# Patient Record
Sex: FEMALE | Race: BLACK OR AFRICAN AMERICAN | NOT HISPANIC OR LATINO | Employment: FULL TIME | URBAN - METROPOLITAN AREA
[De-identification: names, ages, dates, MRNs, and addresses within clinical notes are randomized per-mention and may not be internally consistent; named-entity substitution may affect disease eponyms.]

---

## 2017-12-28 ENCOUNTER — HOSPITAL ENCOUNTER (EMERGENCY)
Facility: HOSPITAL | Age: 47
Discharge: HOME | End: 2017-12-28
Payer: COMMERCIAL

## 2017-12-28 VITALS
SYSTOLIC BLOOD PRESSURE: 144 MMHG | OXYGEN SATURATION: 98 % | HEIGHT: 66 IN | BODY MASS INDEX: 31.02 KG/M2 | WEIGHT: 193 LBS | DIASTOLIC BLOOD PRESSURE: 83 MMHG | HEART RATE: 104 BPM | RESPIRATION RATE: 20 BRPM | TEMPERATURE: 100 F

## 2017-12-28 DIAGNOSIS — N39.0 URINARY TRACT INFECTION WITH HEMATURIA, SITE UNSPECIFIED: Primary | ICD-10-CM

## 2017-12-28 DIAGNOSIS — R31.9 URINARY TRACT INFECTION WITH HEMATURIA, SITE UNSPECIFIED: Primary | ICD-10-CM

## 2017-12-28 LAB
BACTERIA #/AREA URNS AUTO: ABNORMAL /HPF
BILIRUB UR QL STRIP: NEGATIVE
CLARITY UR REFRACT.AUTO: ABNORMAL
COLOR UR AUTO: YELLOW
GLUCOSE UR QL STRIP: NEGATIVE
HGB UR QL STRIP: ABNORMAL
HYALINE CASTS UR QL AUTO: 0 /LPF
KETONES UR QL STRIP: NEGATIVE
LEUKOCYTE ESTERASE UR QL STRIP: ABNORMAL
MICROSCOPIC COMMENT: ABNORMAL
NITRITE UR QL STRIP: POSITIVE
PH UR STRIP: 7 [PH] (ref 5–8)
PROT UR QL STRIP: ABNORMAL
RBC #/AREA URNS AUTO: 30 /HPF (ref 0–4)
SP GR UR STRIP: 1.02 (ref 1–1.03)
URN SPEC COLLECT METH UR: ABNORMAL
UROBILINOGEN UR STRIP-ACNC: <2 EU/DL
WBC #/AREA URNS AUTO: 10 /HPF (ref 0–5)

## 2017-12-28 PROCEDURE — 81000 URINALYSIS NONAUTO W/SCOPE: CPT

## 2017-12-28 PROCEDURE — 63600175 PHARM REV CODE 636 W HCPCS: Performed by: PHYSICIAN ASSISTANT

## 2017-12-28 PROCEDURE — 99283 EMERGENCY DEPT VISIT LOW MDM: CPT | Mod: 25

## 2017-12-28 PROCEDURE — 96372 THER/PROPH/DIAG INJ SC/IM: CPT

## 2017-12-28 PROCEDURE — 25000003 PHARM REV CODE 250: Performed by: PHYSICIAN ASSISTANT

## 2017-12-28 RX ORDER — CLOPIDOGREL BISULFATE 75 MG/1
75 TABLET ORAL DAILY
COMMUNITY

## 2017-12-28 RX ORDER — FLUCONAZOLE 200 MG/1
200 TABLET ORAL DAILY
Qty: 1 TABLET | Refills: 0 | Status: SHIPPED | OUTPATIENT
Start: 2017-12-28 | End: 2017-12-29

## 2017-12-28 RX ORDER — CEPHALEXIN 500 MG/1
500 CAPSULE ORAL EVERY 8 HOURS
Qty: 24 CAPSULE | Refills: 0 | Status: SHIPPED | OUTPATIENT
Start: 2017-12-28 | End: 2018-01-05

## 2017-12-28 RX ORDER — ESCITALOPRAM OXALATE 20 MG/1
20 TABLET ORAL DAILY
COMMUNITY
End: 2018-05-20

## 2017-12-28 RX ORDER — CEFTRIAXONE 1 G/1
1 INJECTION, POWDER, FOR SOLUTION INTRAMUSCULAR; INTRAVENOUS
Status: COMPLETED | OUTPATIENT
Start: 2017-12-28 | End: 2017-12-28

## 2017-12-28 RX ORDER — BUMETANIDE 2 MG/1
2 TABLET ORAL DAILY
COMMUNITY

## 2017-12-28 RX ORDER — IBUPROFEN 200 MG
600 TABLET ORAL
Status: COMPLETED | OUTPATIENT
Start: 2017-12-28 | End: 2017-12-28

## 2017-12-28 RX ORDER — ATORVASTATIN CALCIUM 20 MG/1
20 TABLET, FILM COATED ORAL DAILY
COMMUNITY
End: 2022-05-28

## 2017-12-28 RX ORDER — POTASSIUM CHLORIDE 20 MEQ/1
20 TABLET, EXTENDED RELEASE ORAL 2 TIMES DAILY
COMMUNITY

## 2017-12-28 RX ORDER — OMEPRAZOLE 20 MG/1
40 CAPSULE, DELAYED RELEASE ORAL 2 TIMES DAILY
COMMUNITY
End: 2022-05-28

## 2017-12-28 RX ADMIN — CEFTRIAXONE SODIUM 1 G: 1 INJECTION, POWDER, FOR SOLUTION INTRAMUSCULAR; INTRAVENOUS at 07:12

## 2017-12-28 RX ADMIN — IBUPROFEN 600 MG: 200 TABLET, FILM COATED ORAL at 07:12

## 2017-12-29 NOTE — ED PROVIDER NOTES
History      Chief Complaint   Patient presents with    Dysuria     Reports lower back pain, lower abdominal pain with frequent urination, and dysuria.        Review of patient's allergies indicates:   Allergen Reactions    Ace inhibitors Swelling    Sulfa (sulfonamide antibiotics) Hives        HPI   HPI    12/28/2017, 6:07 PM   History obtained from the patient      History of Present Illness: Kasie Quevedo is a 47 y.o. female patient who presents to the Emergency Department for dysuria and urgency for 3-4 days.  She says she has had UTI's before and this feels same.  She denies fever, n/v.  Admits to mild bilateral flank pain. Symptoms are moderate in severity.     No further complaints or concerns at this time.           PCP: Rigo Sultana MD       Past Medical History:  Past Medical History:   Diagnosis Date    Anxiety     CHF (congestive heart failure)     Coronary artery disease     GERD (gastroesophageal reflux disease)     Hypertension     MI, old          Past Surgical History:  Past Surgical History:   Procedure Laterality Date    BLADDER REPAIR      CARDIAC CATHETERIZATION      CHOLECYSTECTOMY      CORONARY ANGIOPLASTY WITH STENT PLACEMENT      HYSTERECTOMY      STOMACH SURGERY      Gastric sleeve           Family History:  History reviewed. No pertinent family history.        Social History:  Social History     Social History Main Topics    Smoking status: Never Smoker    Smokeless tobacco: Never Used    Alcohol use Yes      Comment: daily, wine    Drug use: No    Sexual activity: Not on file       ROS     Review of Systems   Constitutional: Negative for chills and fever.   HENT: Negative for sore throat.    Respiratory: Negative for shortness of breath.    Cardiovascular: Negative for chest pain.   Gastrointestinal: Negative for nausea.   Genitourinary: Positive for dysuria, frequency and urgency. Negative for vaginal bleeding, vaginal discharge and vaginal pain.  "  Musculoskeletal: Negative for back pain.   Skin: Negative for rash.   Neurological: Negative for weakness.   Hematological: Does not bruise/bleed easily.   All other systems reviewed and are negative.      Physical Exam      Initial Vitals [12/28/17 1755]   BP Pulse Resp Temp SpO2   (!) 182/83 104 18 98.7 °F (37.1 °C) 98 %      MAP       116         Physical Exam  Vital signs and nursing notes reviewed.  Constitutional: Patient is in NAD. Awake and alert. Well-developed and well-nourished.  Head: Atraumatic. Normocephalic.  Eyes: PERRL. EOM intact. Conjunctivae nl. No scleral icterus.  ENT: Mucous membranes are moist. Oropharynx is clear.  Neck: Supple. No JVD. No lymphadenopathy.  No meningismus  Cardiovascular: Regular rate and rhythm. No murmurs, rubs, or gallops. Distal pulses are 2+ and symmetric.  Pulmonary/Chest: No respiratory distress. Clear to auscultation bilaterally. No wheezing, rales, or rhonchi.  Abdominal: Soft. Non-distended. No TTP. No rebound, guarding, or rigidity. Good bowel sounds.  Genitourinary: No CVA tenderness  Musculoskeletal: Moves all extremities. No edema.   Skin: Warm and dry.  Neurological: Awake and alert. No acute focal neurological deficits are appreciated.  Psychiatric: Normal affect. Good eye contact. Appropriate in content.      ED Course          Procedures  ED Vital Signs:  Vitals:    12/28/17 1755 12/28/17 1945   BP: (!) 182/83 (!) 144/83   Pulse: 104 104   Resp: 18 20   Temp: 98.7 °F (37.1 °C) 100.1 °F (37.8 °C)   TempSrc: Oral Oral   SpO2: 98% 98%   Weight: 87.5 kg (193 lb)    Height: 5' 6" (1.676 m)          Results for orders placed or performed during the hospital encounter of 12/28/17   Urinalysis   Result Value Ref Range    Specimen UA Urine, Clean Catch     Color, UA Yellow Yellow, Straw, Kalli    Appearance, UA Hazy (A) Clear    pH, UA 7.0 5.0 - 8.0    Specific Gravity, UA 1.020 1.005 - 1.030    Protein, UA 2+ (A) Negative    Glucose, UA Negative Negative    " Ketones, UA Negative Negative    Bilirubin (UA) Negative Negative    Occult Blood UA 3+ (A) Negative    Nitrite, UA Positive (A) Negative    Urobilinogen, UA <2.0 <2.0 EU/dL    Leukocytes, UA 2+ (A) Negative   Urinalysis Microscopic   Result Value Ref Range    RBC, UA 30 (H) 0 - 4 /hpf    WBC, UA 10 (H) 0 - 5 /hpf    Bacteria, UA Few (A) None-Occ /hpf    Hyaline Casts, UA 0 0-1/lpf /lpf    Microscopic Comment SEE COMMENT              Imaging Results:  Imaging Results    None            The Emergency Provider reviewed the vital signs and test results, which are outlined above.    ED Discussion             Medication(s) given in the ER:  Medications   cefTRIAXone injection 1 g (1 g Intramuscular Given 12/28/17 1914)   ibuprofen tablet 600 mg (600 mg Oral Given 12/28/17 1952)           Follow-up Information     Rigo Sultana MD In 3 days.    Specialty:  Family Medicine  Contact information:  [address] [address] [address]  [phone]             [facility].    Specialty:  Emergency Medicine  Why:  If symptoms worsen  Contact information:  [address] [address] [address] [phone]                     New Prescriptions    CEPHALEXIN (KEFLEX) 500 MG CAPSULE    Take 1 capsule (500 mg total) by mouth every 8 (eight) hours.    FLUCONAZOLE (DIFLUCAN) 200 MG TAB    Take 1 tablet (200 mg total) by mouth once daily.          Medical Decision Making      Pt developing fever.  Administered motrin.  She agrees to rter if worsening of symptoms.  All findings were reviewed with the patient/family in detail.   All remaining questions and concerns were addressed at that time.  Patient/family has been counseled regarding the need for follow-up as well as the indication to return to the emergency room should new or worrisome developments occur.        MDM               Clinical Impression:        ICD-10-CM ICD-9-CM   1. Urinary tract infection with  hematuria, site unspecified N39.0 599.0    R31.9              Annie Crisostomo PA-C  12/28/17 1952

## 2018-05-20 ENCOUNTER — HOSPITAL ENCOUNTER (EMERGENCY)
Facility: HOSPITAL | Age: 48
Discharge: HOME | End: 2018-05-20
Payer: COMMERCIAL

## 2018-05-20 VITALS
RESPIRATION RATE: 18 BRPM | HEIGHT: 66 IN | OXYGEN SATURATION: 97 % | TEMPERATURE: 99 F | SYSTOLIC BLOOD PRESSURE: 123 MMHG | BODY MASS INDEX: 31.5 KG/M2 | DIASTOLIC BLOOD PRESSURE: 66 MMHG | HEART RATE: 71 BPM | WEIGHT: 196 LBS

## 2018-05-20 DIAGNOSIS — J40 BRONCHITIS: Primary | ICD-10-CM

## 2018-05-20 DIAGNOSIS — R05.9 COUGH: ICD-10-CM

## 2018-05-20 PROCEDURE — 63600175 PHARM REV CODE 636 W HCPCS: Performed by: PHYSICIAN ASSISTANT

## 2018-05-20 PROCEDURE — 99284 EMERGENCY DEPT VISIT MOD MDM: CPT | Mod: 25

## 2018-05-20 RX ORDER — CYANOCOBALAMIN (VITAMIN B-12) 500 MCG
TABLET ORAL DAILY
COMMUNITY
End: 2022-05-28

## 2018-05-20 RX ORDER — PREDNISONE 20 MG/1
20 TABLET ORAL 2 TIMES DAILY
Qty: 14 TABLET | Refills: 0 | Status: SHIPPED | OUTPATIENT
Start: 2018-05-20 | End: 2018-05-27

## 2018-05-20 RX ORDER — VALSARTAN 80 MG/1
80 TABLET ORAL DAILY
COMMUNITY

## 2018-05-20 RX ORDER — PREDNISONE 20 MG/1
60 TABLET ORAL
Status: COMPLETED | OUTPATIENT
Start: 2018-05-20 | End: 2018-05-20

## 2018-05-20 RX ORDER — DOXYCYCLINE 100 MG/1
100 CAPSULE ORAL 2 TIMES DAILY
Qty: 20 CAPSULE | Refills: 0 | Status: SHIPPED | OUTPATIENT
Start: 2018-05-20 | End: 2019-12-30

## 2018-05-20 RX ORDER — ASPIRIN 81 MG/1
81 TABLET ORAL DAILY
COMMUNITY

## 2018-05-20 RX ORDER — MODAFINIL 200 MG/1
200 TABLET ORAL DAILY
COMMUNITY
Start: 2014-12-20

## 2018-05-20 RX ORDER — BENZONATATE 100 MG/1
200 CAPSULE ORAL 3 TIMES DAILY PRN
Qty: 20 CAPSULE | Refills: 0 | Status: SHIPPED | OUTPATIENT
Start: 2018-05-20 | End: 2018-05-30

## 2018-05-20 RX ORDER — CITALOPRAM 20 MG/1
20 TABLET, FILM COATED ORAL DAILY
COMMUNITY
Start: 2015-03-02

## 2018-05-20 RX ORDER — METOPROLOL SUCCINATE 25 MG/1
25 TABLET, EXTENDED RELEASE ORAL DAILY
COMMUNITY
Start: 2015-01-15 | End: 2022-05-28

## 2018-05-20 RX ADMIN — PREDNISONE 60 MG: 20 TABLET ORAL at 07:05

## 2018-05-20 NOTE — ED PROVIDER NOTES
"Encounter Date: 5/20/2018       History     Chief Complaint   Patient presents with    URI     Reports URI and treated last week, but states, "I just can't shake it. It's like I need an injection." Reports getting decadron injection and amoxil. Still taking abx      48 yo with cough congestion, x 1 week, seen pcp last week, given rocephin, amoxicillin and steroid shot. Cough persists. Has home nebulizer and albuterol HFA. No fever          Review of patient's allergies indicates:   Allergen Reactions    Ace inhibitors Swelling    Sulfa (sulfonamide antibiotics) Hives     Past Medical History:   Diagnosis Date    Anxiety     CHF (congestive heart failure)     Coronary artery disease     GERD (gastroesophageal reflux disease)     Hypertension     MI, old      Past Surgical History:   Procedure Laterality Date    BLADDER REPAIR      CARDIAC CATHETERIZATION      CHOLECYSTECTOMY      CORONARY ANGIOPLASTY WITH STENT PLACEMENT      HYSTERECTOMY      STOMACH SURGERY      Gastric sleeve     History reviewed. No pertinent family history.  Social History   Substance Use Topics    Smoking status: Never Smoker    Smokeless tobacco: Never Used    Alcohol use Yes      Comment: daily, wine     Review of Systems   Constitutional: Negative for fever.   HENT: Negative for sore throat.    Respiratory: Positive for cough.    Cardiovascular: Negative for chest pain.   Gastrointestinal: Negative for nausea.   Genitourinary: Negative for dysuria.   Musculoskeletal: Negative for back pain.   Skin: Negative for rash.   Neurological: Negative for weakness.   Hematological: Does not bruise/bleed easily.       Physical Exam     Initial Vitals [05/20/18 1834]   BP Pulse Resp Temp SpO2   123/66 71 18 98.6 °F (37 °C) 97 %      MAP       85         Physical Exam    Nursing note and vitals reviewed.  Constitutional: She appears well-developed and well-nourished. No distress.   HENT:   Head: Normocephalic and atraumatic.   Eyes: " Conjunctivae and EOM are normal. Pupils are equal, round, and reactive to light.   Neck: Normal range of motion.   Cardiovascular: Normal rate, regular rhythm and normal heart sounds.   Pulmonary/Chest: Breath sounds normal. No respiratory distress. She has no wheezes. She has no rhonchi. She has no rales. She exhibits no tenderness.   Abdominal: Soft. Bowel sounds are normal. There is no tenderness.   Musculoskeletal: Normal range of motion.   Neurological: She is alert and oriented to person, place, and time. She has normal strength.   Skin: Skin is warm and dry. No rash noted.   Psychiatric: She has a normal mood and affect. Thought content normal.         ED Course   Procedures  Labs Reviewed - No data to display                 Instructed patient to continue her albuterol inhaler and to follow up with her pcp. Return to Ed anytime if symptoms worsens              Clinical Impression:   The primary encounter diagnosis was Bronchitis. A diagnosis of Cough was also pertinent to this visit.    Disposition:   Disposition: Discharged  Condition: Stable                        BRANDON Rojas  05/20/18 1929

## 2018-05-21 NOTE — ED NOTES
Pt updated on test results and poc. Pt has verbalized understanding, and she denies having any further questions or concerns at this time.

## 2019-06-23 ENCOUNTER — HOSPITAL ENCOUNTER (EMERGENCY)
Facility: HOSPITAL | Age: 49
Discharge: HOME | End: 2019-06-23
Attending: EMERGENCY MEDICINE
Payer: COMMERCIAL

## 2019-06-23 VITALS
OXYGEN SATURATION: 98 % | TEMPERATURE: 98 F | WEIGHT: 193.44 LBS | RESPIRATION RATE: 18 BRPM | BODY MASS INDEX: 31.22 KG/M2 | HEART RATE: 70 BPM | SYSTOLIC BLOOD PRESSURE: 130 MMHG | DIASTOLIC BLOOD PRESSURE: 76 MMHG

## 2019-06-23 DIAGNOSIS — R00.2 PALPITATIONS: Primary | ICD-10-CM

## 2019-06-23 DIAGNOSIS — E87.6 HYPOKALEMIA: ICD-10-CM

## 2019-06-23 LAB
ALBUMIN SERPL BCP-MCNC: 3.9 G/DL (ref 3.5–5.2)
ALP SERPL-CCNC: 70 U/L (ref 55–135)
ALT SERPL W/O P-5'-P-CCNC: 14 U/L (ref 10–44)
ANION GAP SERPL CALC-SCNC: 11 MMOL/L (ref 8–16)
AST SERPL-CCNC: 15 U/L (ref 10–40)
BASOPHILS # BLD AUTO: 0.01 K/UL (ref 0–0.2)
BASOPHILS NFR BLD: 0.2 % (ref 0–1.9)
BILIRUB SERPL-MCNC: 0.5 MG/DL (ref 0.1–1)
BNP SERPL-MCNC: 60 PG/ML (ref 0–99)
BUN SERPL-MCNC: 9 MG/DL (ref 6–20)
CALCIUM SERPL-MCNC: 9.2 MG/DL (ref 8.7–10.5)
CHLORIDE SERPL-SCNC: 106 MMOL/L (ref 95–110)
CO2 SERPL-SCNC: 25 MMOL/L (ref 23–29)
CREAT SERPL-MCNC: 0.9 MG/DL (ref 0.5–1.4)
D DIMER PPP IA.FEU-MCNC: 0.9 MG/L FEU
DIFFERENTIAL METHOD: NORMAL
EOSINOPHIL # BLD AUTO: 0.1 K/UL (ref 0–0.5)
EOSINOPHIL NFR BLD: 1 % (ref 0–8)
ERYTHROCYTE [DISTWIDTH] IN BLOOD BY AUTOMATED COUNT: 14.4 % (ref 11.5–14.5)
EST. GFR  (AFRICAN AMERICAN): >60 ML/MIN/1.73 M^2
EST. GFR  (NON AFRICAN AMERICAN): >60 ML/MIN/1.73 M^2
GLUCOSE SERPL-MCNC: 107 MG/DL (ref 70–110)
HCT VFR BLD AUTO: 39.2 % (ref 37–48.5)
HGB BLD-MCNC: 13.1 G/DL (ref 12–16)
LYMPHOCYTES # BLD AUTO: 1.9 K/UL (ref 1–4.8)
LYMPHOCYTES NFR BLD: 38.4 % (ref 18–48)
MCH RBC QN AUTO: 28.5 PG (ref 27–31)
MCHC RBC AUTO-ENTMCNC: 33.4 G/DL (ref 32–36)
MCV RBC AUTO: 85 FL (ref 82–98)
MONOCYTES # BLD AUTO: 0.4 K/UL (ref 0.3–1)
MONOCYTES NFR BLD: 7.6 % (ref 4–15)
NEUTROPHILS # BLD AUTO: 2.6 K/UL (ref 1.8–7.7)
NEUTROPHILS NFR BLD: 52.8 % (ref 38–73)
PLATELET # BLD AUTO: 196 K/UL (ref 150–350)
PMV BLD AUTO: 11 FL (ref 9.2–12.9)
POTASSIUM SERPL-SCNC: 3.2 MMOL/L (ref 3.5–5.1)
PROT SERPL-MCNC: 7.3 G/DL (ref 6–8.4)
RBC # BLD AUTO: 4.59 M/UL (ref 4–5.4)
SODIUM SERPL-SCNC: 142 MMOL/L (ref 136–145)
TROPONIN I SERPL DL<=0.01 NG/ML-MCNC: 0.02 NG/ML (ref 0–0.03)
TROPONIN I SERPL DL<=0.01 NG/ML-MCNC: 0.03 NG/ML (ref 0–0.03)
WBC # BLD AUTO: 5 K/UL (ref 3.9–12.7)

## 2019-06-23 PROCEDURE — 93010 ELECTROCARDIOGRAM REPORT: CPT | Mod: ,,, | Performed by: NUCLEAR MEDICINE

## 2019-06-23 PROCEDURE — 85025 COMPLETE CBC W/AUTO DIFF WBC: CPT | Mod: ER

## 2019-06-23 PROCEDURE — 25500020 PHARM REV CODE 255: Mod: ER | Performed by: EMERGENCY MEDICINE

## 2019-06-23 PROCEDURE — 99285 EMERGENCY DEPT VISIT HI MDM: CPT | Mod: 25,ER

## 2019-06-23 PROCEDURE — 93005 ELECTROCARDIOGRAM TRACING: CPT | Mod: ER

## 2019-06-23 PROCEDURE — 86703 HIV-1/HIV-2 1 RESULT ANTBDY: CPT

## 2019-06-23 PROCEDURE — 99900035 HC TECH TIME PER 15 MIN (STAT): Mod: ER

## 2019-06-23 PROCEDURE — 93010 EKG 12-LEAD: ICD-10-PCS | Mod: ,,, | Performed by: NUCLEAR MEDICINE

## 2019-06-23 PROCEDURE — 85379 FIBRIN DEGRADATION QUANT: CPT | Mod: ER

## 2019-06-23 PROCEDURE — 25000003 PHARM REV CODE 250: Mod: ER | Performed by: EMERGENCY MEDICINE

## 2019-06-23 PROCEDURE — 80053 COMPREHEN METABOLIC PANEL: CPT | Mod: ER

## 2019-06-23 PROCEDURE — 84484 ASSAY OF TROPONIN QUANT: CPT | Mod: ER

## 2019-06-23 PROCEDURE — 83880 ASSAY OF NATRIURETIC PEPTIDE: CPT | Mod: ER

## 2019-06-23 RX ORDER — BUSPIRONE HYDROCHLORIDE 15 MG/1
15 TABLET ORAL NIGHTLY
COMMUNITY

## 2019-06-23 RX ORDER — LORATADINE 10 MG/1
10 TABLET ORAL DAILY
COMMUNITY

## 2019-06-23 RX ORDER — ASPIRIN 325 MG
325 TABLET ORAL
Status: COMPLETED | OUTPATIENT
Start: 2019-06-23 | End: 2019-06-23

## 2019-06-23 RX ORDER — POTASSIUM CHLORIDE 20 MEQ/15ML
40 SOLUTION ORAL
Status: COMPLETED | OUTPATIENT
Start: 2019-06-23 | End: 2019-06-23

## 2019-06-23 RX ORDER — ACETAMINOPHEN 500 MG
1000 TABLET ORAL
Status: COMPLETED | OUTPATIENT
Start: 2019-06-23 | End: 2019-06-23

## 2019-06-23 RX ORDER — TRAMADOL HYDROCHLORIDE 50 MG/1
50 TABLET ORAL EVERY 6 HOURS
COMMUNITY
End: 2023-05-17 | Stop reason: CLARIF

## 2019-06-23 RX ORDER — LOSARTAN POTASSIUM 50 MG/1
50 TABLET ORAL DAILY
COMMUNITY

## 2019-06-23 RX ORDER — CYANOCOBALAMIN 1000 UG/ML
2000 INJECTION, SOLUTION INTRAMUSCULAR; SUBCUTANEOUS
COMMUNITY

## 2019-06-23 RX ORDER — IBUPROFEN 200 MG
1 CAPSULE ORAL DAILY
COMMUNITY

## 2019-06-23 RX ADMIN — ASPIRIN 325 MG ORAL TABLET 325 MG: 325 PILL ORAL at 02:06

## 2019-06-23 RX ADMIN — POTASSIUM CHLORIDE 40 MEQ: 20 SOLUTION ORAL at 04:06

## 2019-06-23 RX ADMIN — ACETAMINOPHEN 1000 MG: 500 TABLET ORAL at 06:06

## 2019-06-23 RX ADMIN — IOHEXOL 100 ML: 350 INJECTION, SOLUTION INTRAVENOUS at 06:06

## 2019-06-23 NOTE — ED PROVIDER NOTES
Encounter Date: 6/23/2019       History     Chief Complaint   Patient presents with    Palpitations     palpations and SOB off and on for a week     The history is provided by the patient.   Palpitations    This is a new problem. The current episode started several days ago (about one week). The problem occurs intermittently. The problem has been waxing and waning. The problem is associated with an unknown factor. Associated symptoms include malaise/fatigue, chest pressure and shortness of breath. Pertinent negatives include no diaphoresis, no fever, no numbness, no chest pain, no claudication, no exertional chest pressure, no irregular heartbeat, no near-syncope, no orthopnea, no PND, no syncope, no abdominal pain, no nausea, no vomiting, no headaches, no back pain, no leg pain, no lower extremity edema, no dizziness, no weakness, no cough, no hemoptysis and no sputum production. She has tried nothing for the symptoms. The treatment provided no relief. Risk factors include a sedentary lifestyle. Her past medical history is significant for heart disease.     Review of patient's allergies indicates:   Allergen Reactions    Ace inhibitors Swelling    Sulfa (sulfonamide antibiotics) Hives     Past Medical History:   Diagnosis Date    Anxiety     CHF (congestive heart failure)     Coronary artery disease     GERD (gastroesophageal reflux disease)     Hypertension     MI, old      Past Surgical History:   Procedure Laterality Date    BLADDER REPAIR      CARDIAC CATHETERIZATION      CHOLECYSTECTOMY      CORONARY ANGIOPLASTY WITH STENT PLACEMENT      HYSTERECTOMY      STOMACH SURGERY      Gastric sleeve     No family history on file.  Social History     Tobacco Use    Smoking status: Never Smoker    Smokeless tobacco: Never Used   Substance Use Topics    Alcohol use: Yes     Comment: daily, wine    Drug use: No     Review of Systems   Constitutional: Positive for malaise/fatigue. Negative for diaphoresis  and fever.   HENT: Negative for sore throat.    Respiratory: Positive for shortness of breath. Negative for cough, hemoptysis and sputum production.    Cardiovascular: Positive for palpitations. Negative for chest pain, orthopnea, claudication, syncope, PND and near-syncope.   Gastrointestinal: Negative for abdominal pain, nausea and vomiting.   Genitourinary: Negative for dysuria.   Musculoskeletal: Negative for back pain.   Skin: Negative for rash.   Neurological: Negative for dizziness, weakness, numbness and headaches.   Hematological: Does not bruise/bleed easily.   All other systems reviewed and are negative.      Physical Exam     Initial Vitals [06/23/19 1429]   BP Pulse Resp Temp SpO2   (!) 171/100 83 18 98.3 °F (36.8 °C) 98 %      MAP       --         Physical Exam    Nursing note and vitals reviewed.  Constitutional: She appears well-developed and well-nourished.   HENT:   Head: Normocephalic and atraumatic.   Mouth/Throat: No oropharyngeal exudate.   Eyes: Conjunctivae and EOM are normal. Pupils are equal, round, and reactive to light.   Neck: Normal range of motion. Neck supple. No thyromegaly present.   Cardiovascular: Normal rate, regular rhythm, normal heart sounds and intact distal pulses. Exam reveals no gallop and no friction rub.    No murmur heard.  Pulmonary/Chest: Effort normal and breath sounds normal. No respiratory distress. She has no decreased breath sounds. She has no wheezes. She has no rhonchi. She exhibits bony tenderness (in area diagrammed, reproduces pt's complaint). She exhibits no tenderness.       Abdominal: Soft. Bowel sounds are normal. She exhibits no distension. There is no tenderness. There is no rebound and no guarding.   Musculoskeletal: Normal range of motion. She exhibits no edema or tenderness.   Lymphadenopathy:     She has no cervical adenopathy.   Neurological: She is alert and oriented to person, place, and time. She has normal strength. No cranial nerve deficit or  sensory deficit.   Skin: Skin is warm and dry. No rash noted.   Psychiatric: She has a normal mood and affect. Her behavior is normal. Judgment and thought content normal.         ED Course   Procedures  Labs Reviewed   COMPREHENSIVE METABOLIC PANEL - Abnormal; Notable for the following components:       Result Value    Potassium 3.2 (*)     All other components within normal limits   D DIMER, QUANTITATIVE - Abnormal; Notable for the following components:    D-Dimer 0.90 (*)     All other components within normal limits   CBC W/ AUTO DIFFERENTIAL   TROPONIN I   B-TYPE NATRIURETIC PEPTIDE   TROPONIN I   D DIMER, QUANTITATIVE   HIV 1 / 2 ANTIBODY     Results for orders placed or performed during the hospital encounter of 06/23/19   CBC auto differential   Result Value Ref Range    WBC 5.00 3.90 - 12.70 K/uL    RBC 4.59 4.00 - 5.40 M/uL    Hemoglobin 13.1 12.0 - 16.0 g/dL    Hematocrit 39.2 37.0 - 48.5 %    Mean Corpuscular Volume 85 82 - 98 fL    Mean Corpuscular Hemoglobin 28.5 27.0 - 31.0 pg    Mean Corpuscular Hemoglobin Conc 33.4 32.0 - 36.0 g/dL    RDW 14.4 11.5 - 14.5 %    Platelets 196 150 - 350 K/uL    MPV 11.0 9.2 - 12.9 fL    Gran # (ANC) 2.6 1.8 - 7.7 K/uL    Lymph # 1.9 1.0 - 4.8 K/uL    Mono # 0.4 0.3 - 1.0 K/uL    Eos # 0.1 0.0 - 0.5 K/uL    Baso # 0.01 0.00 - 0.20 K/uL    Gran% 52.8 38.0 - 73.0 %    Lymph% 38.4 18.0 - 48.0 %    Mono% 7.6 4.0 - 15.0 %    Eosinophil% 1.0 0.0 - 8.0 %    Basophil% 0.2 0.0 - 1.9 %    Differential Method Automated    Comprehensive metabolic panel   Result Value Ref Range    Sodium 142 136 - 145 mmol/L    Potassium 3.2 (L) 3.5 - 5.1 mmol/L    Chloride 106 95 - 110 mmol/L    CO2 25 23 - 29 mmol/L    Glucose 107 70 - 110 mg/dL    BUN, Bld 9 6 - 20 mg/dL    Creatinine 0.9 0.5 - 1.4 mg/dL    Calcium 9.2 8.7 - 10.5 mg/dL    Total Protein 7.3 6.0 - 8.4 g/dL    Albumin 3.9 3.5 - 5.2 g/dL    Total Bilirubin 0.5 0.1 - 1.0 mg/dL    Alkaline Phosphatase 70 55 - 135 U/L    AST 15 10 - 40  U/L    ALT 14 10 - 44 U/L    Anion Gap 11 8 - 16 mmol/L    eGFR if African American >60.0 >60 mL/min/1.73 m^2    eGFR if non African American >60.0 >60 mL/min/1.73 m^2   Troponin I #1   Result Value Ref Range    Troponin I 0.024 0.000 - 0.026 ng/mL   B-Type natriuretic peptide (BNP)   Result Value Ref Range    BNP 60 0 - 99 pg/mL   Troponin I #2   Result Value Ref Range    Troponin I 0.025 0.000 - 0.026 ng/mL   D dimer, quantitative   Result Value Ref Range    D-Dimer 0.90 (H) <0.50 mg/L FEU       EKG Readings: (Independently Interpreted)   Initial Reading: No STEMI. Rhythm: Normal Sinus Rhythm. Heart Rate: 80. Ectopy: No Ectopy. Conduction: Normal. ST Segments: Normal ST Segments. T Waves: Normal. Axis: Left Axis Deviation. Clinical Impression: Normal Sinus Rhythm       Imaging Results          X-Ray Chest AP Portable (Final result)  Result time 06/23/19 14:57:21    Final result by Dima Cunningham MD (06/23/19 14:57:21)                 Impression:      No acute cardiopulmonary disease.      Electronically signed by: Dima Cunningham MD  Date:    06/23/2019  Time:    14:57             Narrative:    EXAMINATION:  XR CHEST AP PORTABLE    CLINICAL HISTORY:  palpitations;    COMPARISON:  Chest x-ray, 05/20/2018    FINDINGS:  The lungs are clear. The heart size is borderline enlarged.  No pleural effusion or pneumothorax or pulmonary edema.  The bones are intact.                                    Vitals:    06/23/19 1429 06/23/19 1435 06/23/19 1447 06/23/19 1619   BP: (!) 171/100  (!) 168/91 (!) 154/82   Pulse: 83 78 78 72   Resp: 18 20 18   Temp: 98.3 °F (36.8 °C)      TempSrc: Oral      SpO2: 98%  98% 99%   Weight: 87.8 kg (193 lb 7.3 oz)       06/23/19 1647 06/23/19 1702 06/23/19 1729   BP: (!) 173/85 (!) 172/81 (!) 140/66   Pulse: 73 72 79   Resp: 18 17 20   Temp:      TempSrc:      SpO2: 98% 98% 98%   Weight:          Results for orders placed or performed during the hospital encounter of 06/23/19   CBC auto  differential   Result Value Ref Range    WBC 5.00 3.90 - 12.70 K/uL    RBC 4.59 4.00 - 5.40 M/uL    Hemoglobin 13.1 12.0 - 16.0 g/dL    Hematocrit 39.2 37.0 - 48.5 %    Mean Corpuscular Volume 85 82 - 98 fL    Mean Corpuscular Hemoglobin 28.5 27.0 - 31.0 pg    Mean Corpuscular Hemoglobin Conc 33.4 32.0 - 36.0 g/dL    RDW 14.4 11.5 - 14.5 %    Platelets 196 150 - 350 K/uL    MPV 11.0 9.2 - 12.9 fL    Gran # (ANC) 2.6 1.8 - 7.7 K/uL    Lymph # 1.9 1.0 - 4.8 K/uL    Mono # 0.4 0.3 - 1.0 K/uL    Eos # 0.1 0.0 - 0.5 K/uL    Baso # 0.01 0.00 - 0.20 K/uL    Gran% 52.8 38.0 - 73.0 %    Lymph% 38.4 18.0 - 48.0 %    Mono% 7.6 4.0 - 15.0 %    Eosinophil% 1.0 0.0 - 8.0 %    Basophil% 0.2 0.0 - 1.9 %    Differential Method Automated    Comprehensive metabolic panel   Result Value Ref Range    Sodium 142 136 - 145 mmol/L    Potassium 3.2 (L) 3.5 - 5.1 mmol/L    Chloride 106 95 - 110 mmol/L    CO2 25 23 - 29 mmol/L    Glucose 107 70 - 110 mg/dL    BUN, Bld 9 6 - 20 mg/dL    Creatinine 0.9 0.5 - 1.4 mg/dL    Calcium 9.2 8.7 - 10.5 mg/dL    Total Protein 7.3 6.0 - 8.4 g/dL    Albumin 3.9 3.5 - 5.2 g/dL    Total Bilirubin 0.5 0.1 - 1.0 mg/dL    Alkaline Phosphatase 70 55 - 135 U/L    AST 15 10 - 40 U/L    ALT 14 10 - 44 U/L    Anion Gap 11 8 - 16 mmol/L    eGFR if African American >60.0 >60 mL/min/1.73 m^2    eGFR if non African American >60.0 >60 mL/min/1.73 m^2   Troponin I #1   Result Value Ref Range    Troponin I 0.024 0.000 - 0.026 ng/mL   B-Type natriuretic peptide (BNP)   Result Value Ref Range    BNP 60 0 - 99 pg/mL   Troponin I #2   Result Value Ref Range    Troponin I 0.025 0.000 - 0.026 ng/mL   D dimer, quantitative   Result Value Ref Range    D-Dimer 0.90 (H) <0.50 mg/L FEU         Imaging Results          X-Ray Chest AP Portable (Final result)  Result time 06/23/19 14:57:21    Final result by Dima Cunningham MD (06/23/19 14:57:21)                 Impression:      No acute cardiopulmonary disease.      Electronically  signed by: Dima Cunningham MD  Date:    06/23/2019  Time:    14:57             Narrative:    EXAMINATION:  XR CHEST AP PORTABLE    CLINICAL HISTORY:  palpitations;    COMPARISON:  Chest x-ray, 05/20/2018    FINDINGS:  The lungs are clear. The heart size is borderline enlarged.  No pleural effusion or pneumothorax or pulmonary edema.  The bones are intact.                                Medications   acetaminophen tablet 1,000 mg (has no administration in time range)   aspirin tablet 325 mg (325 mg Oral Given 6/23/19 7885)   potassium chloride 10% oral solution 40 mEq (40 mEq Oral Given 6/23/19 1626)         4:08 PM  - Re-evaluation:  The patient is resting comfortably and is in no acute distress.  Pt states she had an episode of palpitations that have spontaneously resolved.  In looking at rhythm strip, no anomaly noted.  Discussed test results and notified of pending labs. Answered questions at this time. Will order ddimer to evaluate for possible PE.    5:10 PM Re-evaluation. Dr. Morris reassessed the pt. The pt is resting comfortably and is in no acute distress.  ddimer is elevated. Discussed available test results and notified pt of pending labs. Answered any questions at this time. Will order CTA chest to r/o PE.    6:00 PM - Transfer of Care: Patient care transferred from Dr. Morris to Dr. Funes, pending CTA.      7:42 PM - Counseling: Spoke with the patient and discussed todays findings, in addition to providing specific details for the plan of care and counseling regarding the diagnosis and prognosis. Questions are answered at this time.              Pre-hypertension/Hypertension: The pt has been informed that they may have pre-hypertension or hypertension based on a blood pressure reading in the ED. I recommend that the pt call the PCP listed on their discharge instructions or a physician of their choice this week to arrange f/u for further evaluation of possible pre-hypertension or hypertension.      Kasie Quevedo was given a handout which discussed their disease process, precautions, and instructions for follow-up and therapy.           Medication List      ASK your doctor about these medications    aspirin 81 MG EC tablet  Commonly known as:  ECOTRIN     atorvastatin 20 MG tablet  Commonly known as:  LIPITOR     bumetanide 2 MG tablet  Commonly known as:  BUMEX     busPIRone 15 MG tablet  Commonly known as:  BUSPAR     calcium citrate 200 mg (950 mg) tablet  Commonly known as:  CALCITRATE     cholecalciferol (vitamin D3) 400 unit Tab  Commonly known as:  VITAMIN D3     citalopram 20 MG tablet  Commonly known as:  CELEXA     clopidogrel 75 mg tablet  Commonly known as:  PLAVIX     doxycycline 100 MG capsule  Commonly known as:  MONODOX  Take 1 capsule (100 mg total) by mouth 2 (two) times daily.     loratadine 10 mg tablet  Commonly known as:  CLARITIN     losartan 50 MG tablet  Commonly known as:  COZAAR     metoprolol succinate 25 MG 24 hr tablet  Commonly known as:  TOPROL-XL     modafinil 200 MG Tab  Commonly known as:  PROVIGIL     multivitamin capsule     omeprazole 20 MG capsule  Commonly known as:  PRILOSEC     potassium chloride SA 20 MEQ tablet  Commonly known as:  K-DUR,KLOR-CON     traMADol 50 mg tablet  Commonly known as:  ULTRAM     valsartan 80 MG tablet  Commonly known as:  DIOVAN     VITAMIN B-12 1,000 mcg/mL injection  Generic drug:  cyanocobalamin           Current Discharge Medication List            ED Diagnosis  1. Palpitations    2. Hypokalemia           Additional MDM:   Heart Score:    History:          Slightly suspicious.  ECG:             Normal  Age:               45-65 years  Risk factors: 1-2 risk factors  Troponin:       Less than or equal to normal limit  Final Score: 2                       Clinical Impression:       ICD-10-CM ICD-9-CM   1. Palpitations R00.2 785.1   2. Hypokalemia E87.6 276.8         Disposition:   Disposition: Discharged  Condition:  Stable                        Andrew Funes MD  06/23/19 1943

## 2019-06-24 LAB — HIV 1+2 AB+HIV1 P24 AG SERPL QL IA: NEGATIVE

## 2019-12-30 ENCOUNTER — HOSPITAL ENCOUNTER (EMERGENCY)
Facility: HOSPITAL | Age: 49
Discharge: HOME | End: 2019-12-30
Attending: EMERGENCY MEDICINE
Payer: COMMERCIAL

## 2019-12-30 VITALS
RESPIRATION RATE: 18 BRPM | SYSTOLIC BLOOD PRESSURE: 132 MMHG | HEART RATE: 60 BPM | OXYGEN SATURATION: 98 % | DIASTOLIC BLOOD PRESSURE: 86 MMHG | BODY MASS INDEX: 30.92 KG/M2 | WEIGHT: 192.38 LBS | TEMPERATURE: 99 F | HEIGHT: 66 IN

## 2019-12-30 DIAGNOSIS — R35.0 URINARY FREQUENCY: Primary | ICD-10-CM

## 2019-12-30 DIAGNOSIS — F41.9 ANXIETY: ICD-10-CM

## 2019-12-30 DIAGNOSIS — R31.21 ASYMPTOMATIC MICROSCOPIC HEMATURIA: ICD-10-CM

## 2019-12-30 LAB
ALBUMIN SERPL BCP-MCNC: 3.9 G/DL (ref 3.5–5.2)
ALP SERPL-CCNC: 64 U/L (ref 55–135)
ALT SERPL W/O P-5'-P-CCNC: 9 U/L (ref 10–44)
ANION GAP SERPL CALC-SCNC: 7 MMOL/L (ref 8–16)
APTT BLDCRRT: 28.3 SEC (ref 21–32)
AST SERPL-CCNC: 12 U/L (ref 10–40)
BACTERIA #/AREA URNS AUTO: NORMAL /HPF
BASOPHILS # BLD AUTO: 0.03 K/UL (ref 0–0.2)
BASOPHILS NFR BLD: 0.5 % (ref 0–1.9)
BILIRUB SERPL-MCNC: 0.3 MG/DL (ref 0.1–1)
BILIRUB UR QL STRIP: NEGATIVE
BUN SERPL-MCNC: 14 MG/DL (ref 6–20)
CALCIUM SERPL-MCNC: 9.7 MG/DL (ref 8.7–10.5)
CHLORIDE SERPL-SCNC: 107 MMOL/L (ref 95–110)
CLARITY UR REFRACT.AUTO: CLEAR
CO2 SERPL-SCNC: 27 MMOL/L (ref 23–29)
COLOR UR AUTO: YELLOW
CREAT SERPL-MCNC: 1 MG/DL (ref 0.5–1.4)
DIFFERENTIAL METHOD: ABNORMAL
EOSINOPHIL # BLD AUTO: 0.1 K/UL (ref 0–0.5)
EOSINOPHIL NFR BLD: 2.3 % (ref 0–8)
ERYTHROCYTE [DISTWIDTH] IN BLOOD BY AUTOMATED COUNT: 14.6 % (ref 11.5–14.5)
EST. GFR  (AFRICAN AMERICAN): >60 ML/MIN/1.73 M^2
EST. GFR  (NON AFRICAN AMERICAN): >60 ML/MIN/1.73 M^2
GLUCOSE SERPL-MCNC: 105 MG/DL (ref 70–110)
GLUCOSE UR QL STRIP: NEGATIVE
HCT VFR BLD AUTO: 40.4 % (ref 37–48.5)
HGB BLD-MCNC: 13.3 G/DL (ref 12–16)
HGB UR QL STRIP: ABNORMAL
IMM GRANULOCYTES # BLD AUTO: 0.01 K/UL (ref 0–0.04)
IMM GRANULOCYTES NFR BLD AUTO: 0.2 % (ref 0–0.5)
INR PPP: 1 (ref 0.8–1.2)
KETONES UR QL STRIP: NEGATIVE
LEUKOCYTE ESTERASE UR QL STRIP: NEGATIVE
LYMPHOCYTES # BLD AUTO: 2.5 K/UL (ref 1–4.8)
LYMPHOCYTES NFR BLD: 44.1 % (ref 18–48)
MCH RBC QN AUTO: 28.8 PG (ref 27–31)
MCHC RBC AUTO-ENTMCNC: 32.9 G/DL (ref 32–36)
MCV RBC AUTO: 87 FL (ref 82–98)
MICROSCOPIC COMMENT: NORMAL
MONOCYTES # BLD AUTO: 0.5 K/UL (ref 0.3–1)
MONOCYTES NFR BLD: 8.5 % (ref 4–15)
NEUTROPHILS # BLD AUTO: 2.6 K/UL (ref 1.8–7.7)
NEUTROPHILS NFR BLD: 44.4 % (ref 38–73)
NITRITE UR QL STRIP: NEGATIVE
NRBC BLD-RTO: 0 /100 WBC
PH UR STRIP: 6 [PH] (ref 5–8)
PLATELET # BLD AUTO: 217 K/UL (ref 150–350)
PMV BLD AUTO: 10.6 FL (ref 9.2–12.9)
POTASSIUM SERPL-SCNC: 4.8 MMOL/L (ref 3.5–5.1)
PROT SERPL-MCNC: 7.2 G/DL (ref 6–8.4)
PROT UR QL STRIP: NEGATIVE
PROTHROMBIN TIME: 10 SEC (ref 9–12.5)
RBC # BLD AUTO: 4.62 M/UL (ref 4–5.4)
RBC #/AREA URNS AUTO: 2 /HPF (ref 0–4)
SODIUM SERPL-SCNC: 141 MMOL/L (ref 136–145)
SP GR UR STRIP: 1.01 (ref 1–1.03)
SQUAMOUS #/AREA URNS AUTO: 3 /HPF
URN SPEC COLLECT METH UR: ABNORMAL
UROBILINOGEN UR STRIP-ACNC: NEGATIVE EU/DL
WBC # BLD AUTO: 5.76 K/UL (ref 3.9–12.7)
WBC #/AREA URNS AUTO: 1 /HPF (ref 0–5)

## 2019-12-30 PROCEDURE — 86703 HIV-1/HIV-2 1 RESULT ANTBDY: CPT

## 2019-12-30 PROCEDURE — 99283 EMERGENCY DEPT VISIT LOW MDM: CPT | Mod: ER

## 2019-12-30 PROCEDURE — 25000003 PHARM REV CODE 250: Mod: ER | Performed by: NURSE PRACTITIONER

## 2019-12-30 PROCEDURE — 85025 COMPLETE CBC W/AUTO DIFF WBC: CPT | Mod: ER

## 2019-12-30 PROCEDURE — 85610 PROTHROMBIN TIME: CPT | Mod: ER

## 2019-12-30 PROCEDURE — 85730 THROMBOPLASTIN TIME PARTIAL: CPT | Mod: ER

## 2019-12-30 PROCEDURE — 80053 COMPREHEN METABOLIC PANEL: CPT | Mod: ER

## 2019-12-30 PROCEDURE — 81000 URINALYSIS NONAUTO W/SCOPE: CPT | Mod: ER

## 2019-12-30 RX ORDER — LORAZEPAM 1 MG/1
1 TABLET ORAL
Status: COMPLETED | OUTPATIENT
Start: 2019-12-30 | End: 2019-12-30

## 2019-12-30 RX ADMIN — LORAZEPAM 1 MG: 1 TABLET ORAL at 09:12

## 2019-12-31 LAB — HIV 1+2 AB+HIV1 P24 AG SERPL QL IA: NEGATIVE

## 2020-01-07 NOTE — ED PROVIDER NOTES
"Encounter Date: 12/30/2019       History     Chief Complaint   Patient presents with    Urinary Frequency     started a week ago; also thinks her potassium is low     The history is provided by the patient.   General Illness    The current episode started several days ago. Progression since onset: waxing and waning. The pain is at a severity of 0/10. Pertinent negatives include no fever, no double vision, no abdominal pain, no constipation, no diarrhea, no nausea, no vomiting, no congestion, no headaches, no rhinorrhea, no sore throat, no stridor, no swollen glands, no muscle aches, no cough, no shortness of breath, no URI, no wheezing and no rash. She has received no recent medical care.   Ms. Quevedo presents to the emergency department with complaints of urinary frequency and hematuria. She states that she also "thinks that her potassium is low".  She reports that "the last time she felt this way she had to be treated for a low potassium level.  She denies any shortness of breath, chest pain, dyspnea, or any further complaints.        PCP:     Rigo Sultana MD        Review of patient's allergies indicates:   Allergen Reactions    Ace inhibitors Swelling    Hydrochlorothiazide     Sulfa (sulfonamide antibiotics) Hives    Sulfur soap      Past Medical History:   Diagnosis Date    Anxiety     CHF (congestive heart failure)     Coronary artery disease     GERD (gastroesophageal reflux disease)     Hypertension     MI, old      Past Surgical History:   Procedure Laterality Date    BLADDER REPAIR      CARDIAC CATHETERIZATION      CHOLECYSTECTOMY      CORONARY ANGIOPLASTY WITH STENT PLACEMENT      HYSTERECTOMY      STOMACH SURGERY      Gastric sleeve     No family history on file.  Social History     Tobacco Use    Smoking status: Never Smoker    Smokeless tobacco: Never Used   Substance Use Topics    Alcohol use: Yes     Comment: daily, wine    Drug use: No     Review of Systems "   Constitutional: Negative for chills and fever.   HENT: Negative for congestion, rhinorrhea and sore throat.    Eyes: Negative for double vision.   Respiratory: Negative for cough, chest tightness, shortness of breath, wheezing and stridor.    Cardiovascular: Negative for chest pain.   Gastrointestinal: Negative for abdominal pain, constipation, diarrhea, nausea and vomiting.   Genitourinary: Positive for frequency and hematuria. Negative for dysuria.   Musculoskeletal: Negative for back pain.   Skin: Negative for rash.   Neurological: Negative for dizziness, weakness, light-headedness and headaches.   Hematological: Does not bruise/bleed easily.   Psychiatric/Behavioral: The patient is nervous/anxious.        Physical Exam     Initial Vitals [12/30/19 1814]   BP Pulse Resp Temp SpO2   (!) 168/84 65 18 98.6 °F (37 °C) 98 %      MAP       --         Physical Exam    Nursing note and vitals reviewed.  Constitutional: She appears well-developed and well-nourished. She is cooperative. She does not appear ill. No distress.   HENT:   Head: Normocephalic and atraumatic.   Right Ear: Hearing and external ear normal.   Left Ear: Hearing and external ear normal.   Nose: Nose normal.   Mouth/Throat: Uvula is midline, oropharynx is clear and moist and mucous membranes are normal.   Eyes: Conjunctivae, EOM and lids are normal. Pupils are equal, round, and reactive to light.   Neck: Trachea normal and normal range of motion. Neck supple.   Cardiovascular: Normal rate, regular rhythm, intact distal pulses and normal pulses.   Pulmonary/Chest: Effort normal and breath sounds normal. No respiratory distress. She has no wheezes. She has no rhonchi. She has no rales.   Abdominal: Soft. Normal appearance. She exhibits no distension and no mass. There is no tenderness. There is no rigidity, no rebound, no guarding and no CVA tenderness.   Musculoskeletal: Normal range of motion. She exhibits no edema or tenderness.   Neurological: She  is alert and oriented to person, place, and time. She has normal strength. No sensory deficit. Gait normal. GCS eye subscore is 4. GCS verbal subscore is 5. GCS motor subscore is 6.   Neurovascular intact to all extremities.    Skin: Skin is warm, dry and intact. Capillary refill takes less than 2 seconds. No rash noted.   Neurovascular intact to all extremities.    Psychiatric: Her speech is normal and behavior is normal. Her mood appears anxious. Her affect is not blunt, not labile and not inappropriate. Cognition and memory are normal.         ED Course   Procedures     ED Abnormal Lab Results:   Labs Reviewed   URINALYSIS, REFLEX TO URINE CULTURE - Abnormal; Notable for the following components:       Result Value    Occult Blood UA 2+ (*)     All other components within normal limits    Narrative:     Preferred Collection Type->Urine, Clean Catch   CBC W/ AUTO DIFFERENTIAL - Abnormal; Notable for the following components:    RDW 14.6 (*)     All other components within normal limits   COMPREHENSIVE METABOLIC PANEL - Abnormal; Notable for the following components:    ALT 9 (*)     Anion Gap 7 (*)     All other components within normal limits   HIV 1 / 2 ANTIBODY   URINALYSIS MICROSCOPIC    Narrative:     Preferred Collection Type->Urine, Clean Catch   APTT   PROTIME-INR       ED Lab Results:   Results for orders placed or performed during the hospital encounter of 12/30/19   HIV 1/2 Ag/Ab (4th Gen)   Result Value Ref Range    HIV 1/2 Ag/Ab Negative Negative   Urinalysis, Reflex to Urine Culture Urine, Clean Catch   Result Value Ref Range    Specimen UA Urine, Clean Catch     Color, UA Yellow Yellow, Straw, Kalli    Appearance, UA Clear Clear    pH, UA 6.0 5.0 - 8.0    Specific Gravity, UA 1.015 1.005 - 1.030    Protein, UA Negative Negative    Glucose, UA Negative Negative    Ketones, UA Negative Negative    Bilirubin (UA) Negative Negative    Occult Blood UA 2+ (A) Negative    Nitrite, UA Negative Negative     Urobilinogen, UA Negative <2.0 EU/dL    Leukocytes, UA Negative Negative   Urinalysis Microscopic   Result Value Ref Range    RBC, UA 2 0 - 4 /hpf    WBC, UA 1 0 - 5 /hpf    Bacteria Rare None-Occ /hpf    Squam Epithel, UA 3 /hpf    Microscopic Comment SEE COMMENT    CBC auto differential   Result Value Ref Range    WBC 5.76 3.90 - 12.70 K/uL    RBC 4.62 4.00 - 5.40 M/uL    Hemoglobin 13.3 12.0 - 16.0 g/dL    Hematocrit 40.4 37.0 - 48.5 %    Mean Corpuscular Volume 87 82 - 98 fL    Mean Corpuscular Hemoglobin 28.8 27.0 - 31.0 pg    Mean Corpuscular Hemoglobin Conc 32.9 32.0 - 36.0 g/dL    RDW 14.6 (H) 11.5 - 14.5 %    Platelets 217 150 - 350 K/uL    MPV 10.6 9.2 - 12.9 fL    Immature Granulocytes 0.2 0.0 - 0.5 %    Gran # (ANC) 2.6 1.8 - 7.7 K/uL    Immature Grans (Abs) 0.01 0.00 - 0.04 K/uL    Lymph # 2.5 1.0 - 4.8 K/uL    Mono # 0.5 0.3 - 1.0 K/uL    Eos # 0.1 0.0 - 0.5 K/uL    Baso # 0.03 0.00 - 0.20 K/uL    nRBC 0 0 /100 WBC    Gran% 44.4 38.0 - 73.0 %    Lymph% 44.1 18.0 - 48.0 %    Mono% 8.5 4.0 - 15.0 %    Eosinophil% 2.3 0.0 - 8.0 %    Basophil% 0.5 0.0 - 1.9 %    Differential Method Automated    Comprehensive metabolic panel   Result Value Ref Range    Sodium 141 136 - 145 mmol/L    Potassium 4.8 3.5 - 5.1 mmol/L    Chloride 107 95 - 110 mmol/L    CO2 27 23 - 29 mmol/L    Glucose 105 70 - 110 mg/dL    BUN, Bld 14 6 - 20 mg/dL    Creatinine 1.0 0.5 - 1.4 mg/dL    Calcium 9.7 8.7 - 10.5 mg/dL    Total Protein 7.2 6.0 - 8.4 g/dL    Albumin 3.9 3.5 - 5.2 g/dL    Total Bilirubin 0.3 0.1 - 1.0 mg/dL    Alkaline Phosphatase 64 55 - 135 U/L    AST 12 10 - 40 U/L    ALT 9 (L) 10 - 44 U/L    Anion Gap 7 (L) 8 - 16 mmol/L    eGFR if African American >60.0 >60 mL/min/1.73 m^2    eGFR if non African American >60.0 >60 mL/min/1.73 m^2   APTT   Result Value Ref Range    aPTT 28.3 21.0 - 32.0 sec   Protime-INR   Result Value Ref Range    Prothrombin Time 10.0 9.0 - 12.5 sec    INR 1.0 0.8 - 1.2       ED Medications:  "  Medications   LORazepam tablet 1 mg (1 mg Oral Given 12/30/19 2140)         ED Course Vitals  Vitals:    12/30/19 1814 12/30/19 2140   BP: (!) 168/84 132/86   BP Location: Left arm    Patient Position: Sitting    Pulse: 65 60   Resp: 18 18   Temp: 98.6 °F (37 °C)    TempSrc: Oral    SpO2: 98% 98%   Weight: 87.2 kg (192 lb 5.6 oz)    Height: 5' 6" (1.676 m)          2140 HOURS RE-EVALUATION & DISPOSITION:   Reassessment at the time of disposition demonstrates that the patient is resting comfortably in no acute distress.  She has remained hemodynamically stable throughout the entire ED visit and is without objective evidence for acute process requiring urgent intervention or hospitalization. I discussed test results and provided counseling to patient with regard to condition, the treatment plan, specific conditions for return, and the importance of follow up.  Answered questions at this time. The patient is stable for discharge.                   Medical Decision Making:   History:   Old Records Summarized: records from clinic visits.  Clinical Tests:   Lab Tests: Ordered and Reviewed                                 Clinical Impression:       ICD-10-CM ICD-9-CM   1. Urinary frequency R35.0 788.41   2. Anxiety F41.9 300.00   3. Asymptomatic microscopic hematuria R31.21 599.72           Disposition:   Disposition: Discharged  Condition: Stable  I discussed with patient that the evaluation in the emergency department does not suggest any emergent or life threatening medical condition requiring immediate intervention beyond what was provided in the ED, and I believe patient is safe for discharge.  Regardless, an unremarkable evaluation in the ED does not preclude the development or presence of a serious of life threatening condition. As such, patient was instructed to return immediately for any worsening or change in current symptoms. I also discussed the results of my evaluation and diagnosis with patient and she " concurs with the evaluation and management plan.  Detailed written and verbal instructions provided to patient and she expressed a verbal understanding of the discharge instructions and overall management plan. Reiterated the importance of medication administration and safety and advised patient to follow up with primary care provider in 3-5 days or sooner if needed.  Also advised patient to return to the ER for any complications.     Regarding HEMATURIA,  I instructed patient to report: any discomfort with urination, urinary frequency or urgency; unexplained weight loss; develop fever, nausea, vomiting, shaking chills, or pain in your abdomen, side, or back; are unable to urinate; are passing blood clots in urine; or have urine dribbling, nighttime urination, or difficulty starting urine flow.     Regarding ANXIETY, I discussed signs and symptoms of anxiety with patient including: tachycardia, dysrhythmias, tachypnea, diaphoresis, trembling, dizziness, diarrhea, dry mouth, and difficulty swallowing.  Patient was encouraged to eat a well-balanced, healthy diet; get plenty of rest; exercise daily; limit caffeine and alcohol intake; participate in meditation; talk with family and/or friends about things that may be considered stressful; and keep a diary of feelings and stress triggers.  Patient was instructed to take medications as prescribed and follow up with primary care provider for long term management. I recommended that the patient return to the emergency department if they: feel lightheaded or too dizzy to stand up; develop feelings that they want to hurt themselves or someone else; or develop chest pain, tightness, or heaviness that radiates to the shoulders, arms, jaw, neck, or back.       Discharge Medication List as of 12/30/2019  9:41 PM            Follow-up Information     Schedule an appointment as soon as possible for a visit  with Rigo Sultana MD.    Specialty:  Family Medicine  Contact  information:  [address] [facility] [address]  [phone]             Go to  [facility].    Specialty:  Emergency Medicine  Why:  As needed  Contact information:  [address]  [facility] [address] [phone]                                Omar Valladares NP  01/06/20 2937

## 2021-08-27 ENCOUNTER — LAB VISIT (OUTPATIENT)
Dept: LAB | Facility: HOSPITAL | Age: 51
End: 2021-08-27
Attending: ORTHOPAEDIC SURGERY
Payer: COMMERCIAL

## 2021-08-27 DIAGNOSIS — Z79.01 LONG TERM (CURRENT) USE OF ANTICOAGULANTS: ICD-10-CM

## 2021-08-27 DIAGNOSIS — Z01.812 PRE-OPERATIVE LABORATORY EXAMINATION: ICD-10-CM

## 2021-08-27 LAB
APTT BLDCRRT: 26.3 SEC (ref 21–32)
BASOPHILS # BLD AUTO: ABNORMAL K/UL (ref 0–0.2)
BASOPHILS NFR BLD: 0 % (ref 0–1.9)
DIFFERENTIAL METHOD: ABNORMAL
EOSINOPHIL # BLD AUTO: ABNORMAL K/UL (ref 0–0.5)
EOSINOPHIL NFR BLD: 2 % (ref 0–8)
ERYTHROCYTE [DISTWIDTH] IN BLOOD BY AUTOMATED COUNT: 14.2 % (ref 11.5–14.5)
HCT VFR BLD AUTO: 37.1 % (ref 37–48.5)
HGB BLD-MCNC: 12.3 G/DL (ref 12–16)
HYPOCHROMIA BLD QL SMEAR: ABNORMAL
IMM GRANULOCYTES # BLD AUTO: ABNORMAL K/UL (ref 0–0.04)
IMM GRANULOCYTES NFR BLD AUTO: ABNORMAL % (ref 0–0.5)
INR PPP: 1 (ref 0.8–1.2)
LYMPHOCYTES # BLD AUTO: ABNORMAL K/UL (ref 1–4.8)
LYMPHOCYTES NFR BLD: 46 % (ref 18–48)
MCH RBC QN AUTO: 29.2 PG (ref 27–31)
MCHC RBC AUTO-ENTMCNC: 33.2 G/DL (ref 32–36)
MCV RBC AUTO: 88 FL (ref 82–98)
MONOCYTES # BLD AUTO: ABNORMAL K/UL (ref 0.3–1)
MONOCYTES NFR BLD: 2 % (ref 4–15)
NEUTROPHILS # BLD AUTO: ABNORMAL K/UL (ref 1.8–7.7)
NEUTROPHILS NFR BLD: 50 % (ref 38–73)
NRBC BLD-RTO: 0 /100 WBC
PLATELET # BLD AUTO: 180 K/UL (ref 150–450)
PMV BLD AUTO: 10 FL (ref 9.2–12.9)
PROTHROMBIN TIME: 10.5 SEC (ref 9–12.5)
RBC # BLD AUTO: 4.21 M/UL (ref 4–5.4)
WBC # BLD AUTO: 4.58 K/UL (ref 3.9–12.7)

## 2021-08-27 PROCEDURE — 36415 COLL VENOUS BLD VENIPUNCTURE: CPT | Mod: PO | Performed by: ORTHOPAEDIC SURGERY

## 2021-08-27 PROCEDURE — 85027 COMPLETE CBC AUTOMATED: CPT | Mod: PO | Performed by: ORTHOPAEDIC SURGERY

## 2021-08-27 PROCEDURE — 85007 BL SMEAR W/DIFF WBC COUNT: CPT | Mod: PO | Performed by: ORTHOPAEDIC SURGERY

## 2021-08-27 PROCEDURE — 85610 PROTHROMBIN TIME: CPT | Mod: PO | Performed by: ORTHOPAEDIC SURGERY

## 2021-08-27 PROCEDURE — 85730 THROMBOPLASTIN TIME PARTIAL: CPT | Mod: PO | Performed by: ORTHOPAEDIC SURGERY

## 2021-10-15 ENCOUNTER — HOSPITAL ENCOUNTER (EMERGENCY)
Facility: HOSPITAL | Age: 51
Discharge: HOME | End: 2021-10-15
Attending: EMERGENCY MEDICINE
Payer: COMMERCIAL

## 2021-10-15 VITALS
BODY MASS INDEX: 28.93 KG/M2 | HEIGHT: 66 IN | RESPIRATION RATE: 20 BRPM | DIASTOLIC BLOOD PRESSURE: 72 MMHG | WEIGHT: 180 LBS | TEMPERATURE: 99 F | OXYGEN SATURATION: 99 % | HEART RATE: 69 BPM | SYSTOLIC BLOOD PRESSURE: 121 MMHG

## 2021-10-15 DIAGNOSIS — R07.9 CHEST PAIN, UNSPECIFIED TYPE: Primary | ICD-10-CM

## 2021-10-15 DIAGNOSIS — R07.9 CHEST PAIN: ICD-10-CM

## 2021-10-15 LAB
ALBUMIN SERPL BCP-MCNC: 3.9 G/DL (ref 3.5–5.2)
ALP SERPL-CCNC: 67 U/L (ref 55–135)
ALT SERPL W/O P-5'-P-CCNC: 18 U/L (ref 10–44)
ANION GAP SERPL CALC-SCNC: 10 MMOL/L (ref 8–16)
APTT BLDCRRT: 27 SEC (ref 21–32)
AST SERPL-CCNC: 12 U/L (ref 10–40)
BASOPHILS # BLD AUTO: 0.03 K/UL (ref 0–0.2)
BASOPHILS NFR BLD: 0.4 % (ref 0–1.9)
BILIRUB SERPL-MCNC: 0.5 MG/DL (ref 0.1–1)
BNP SERPL-MCNC: 65 PG/ML (ref 0–99)
BUN SERPL-MCNC: 13 MG/DL (ref 6–20)
CALCIUM SERPL-MCNC: 9 MG/DL (ref 8.7–10.5)
CHLORIDE SERPL-SCNC: 102 MMOL/L (ref 95–110)
CO2 SERPL-SCNC: 28 MMOL/L (ref 23–29)
CREAT SERPL-MCNC: 1.3 MG/DL (ref 0.5–1.4)
DIFFERENTIAL METHOD: ABNORMAL
EOSINOPHIL # BLD AUTO: 0.2 K/UL (ref 0–0.5)
EOSINOPHIL NFR BLD: 1.8 % (ref 0–8)
ERYTHROCYTE [DISTWIDTH] IN BLOOD BY AUTOMATED COUNT: 14.8 % (ref 11.5–14.5)
EST. GFR  (AFRICAN AMERICAN): 54.5 ML/MIN/1.73 M^2
EST. GFR  (NON AFRICAN AMERICAN): 47.3 ML/MIN/1.73 M^2
GLUCOSE SERPL-MCNC: 92 MG/DL (ref 70–110)
HCT VFR BLD AUTO: 41.6 % (ref 37–48.5)
HEP C VIRUS HOLD SPECIMEN: NORMAL
HGB BLD-MCNC: 13.5 G/DL (ref 12–16)
IMM GRANULOCYTES # BLD AUTO: 0.04 K/UL (ref 0–0.04)
IMM GRANULOCYTES NFR BLD AUTO: 0.5 % (ref 0–0.5)
INR PPP: 0.9 (ref 0.8–1.2)
LYMPHOCYTES # BLD AUTO: 2.4 K/UL (ref 1–4.8)
LYMPHOCYTES NFR BLD: 28.7 % (ref 18–48)
MCH RBC QN AUTO: 28.9 PG (ref 27–31)
MCHC RBC AUTO-ENTMCNC: 32.5 G/DL (ref 32–36)
MCV RBC AUTO: 89 FL (ref 82–98)
MONOCYTES # BLD AUTO: 0.7 K/UL (ref 0.3–1)
MONOCYTES NFR BLD: 8.3 % (ref 4–15)
NEUTROPHILS # BLD AUTO: 5.1 K/UL (ref 1.8–7.7)
NEUTROPHILS NFR BLD: 60.3 % (ref 38–73)
NRBC BLD-RTO: 0 /100 WBC
PLATELET # BLD AUTO: 222 K/UL (ref 150–450)
PMV BLD AUTO: 11 FL (ref 9.2–12.9)
POTASSIUM SERPL-SCNC: 3.3 MMOL/L (ref 3.5–5.1)
PROT SERPL-MCNC: 7.3 G/DL (ref 6–8.4)
PROTHROMBIN TIME: 10.1 SEC (ref 9–12.5)
RBC # BLD AUTO: 4.67 M/UL (ref 4–5.4)
SODIUM SERPL-SCNC: 140 MMOL/L (ref 136–145)
TROPONIN I SERPL DL<=0.01 NG/ML-MCNC: 0.02 NG/ML (ref 0–0.03)
TROPONIN I SERPL DL<=0.01 NG/ML-MCNC: 0.02 NG/ML (ref 0–0.03)
WBC # BLD AUTO: 8.36 K/UL (ref 3.9–12.7)

## 2021-10-15 PROCEDURE — 93010 ELECTROCARDIOGRAM REPORT: CPT | Mod: ,,, | Performed by: STUDENT IN AN ORGANIZED HEALTH CARE EDUCATION/TRAINING PROGRAM

## 2021-10-15 PROCEDURE — 80053 COMPREHEN METABOLIC PANEL: CPT | Mod: ER | Performed by: EMERGENCY MEDICINE

## 2021-10-15 PROCEDURE — 99284 EMERGENCY DEPT VISIT MOD MDM: CPT | Mod: 25,ER

## 2021-10-15 PROCEDURE — 93005 ELECTROCARDIOGRAM TRACING: CPT | Mod: ER

## 2021-10-15 PROCEDURE — 85730 THROMBOPLASTIN TIME PARTIAL: CPT | Mod: ER | Performed by: EMERGENCY MEDICINE

## 2021-10-15 PROCEDURE — 85610 PROTHROMBIN TIME: CPT | Mod: ER | Performed by: EMERGENCY MEDICINE

## 2021-10-15 PROCEDURE — 93010 EKG 12-LEAD: ICD-10-PCS | Mod: ,,, | Performed by: STUDENT IN AN ORGANIZED HEALTH CARE EDUCATION/TRAINING PROGRAM

## 2021-10-15 PROCEDURE — 86803 HEPATITIS C AB TEST: CPT | Performed by: EMERGENCY MEDICINE

## 2021-10-15 PROCEDURE — 63600175 PHARM REV CODE 636 W HCPCS: Mod: ER | Performed by: EMERGENCY MEDICINE

## 2021-10-15 PROCEDURE — 83880 ASSAY OF NATRIURETIC PEPTIDE: CPT | Mod: ER | Performed by: EMERGENCY MEDICINE

## 2021-10-15 PROCEDURE — 84484 ASSAY OF TROPONIN QUANT: CPT | Mod: 91,ER | Performed by: EMERGENCY MEDICINE

## 2021-10-15 PROCEDURE — 87389 HIV-1 AG W/HIV-1&-2 AB AG IA: CPT | Performed by: EMERGENCY MEDICINE

## 2021-10-15 PROCEDURE — 96374 THER/PROPH/DIAG INJ IV PUSH: CPT | Mod: ER

## 2021-10-15 PROCEDURE — 25000242 PHARM REV CODE 250 ALT 637 W/ HCPCS: Mod: ER | Performed by: EMERGENCY MEDICINE

## 2021-10-15 PROCEDURE — 85025 COMPLETE CBC W/AUTO DIFF WBC: CPT | Mod: ER | Performed by: EMERGENCY MEDICINE

## 2021-10-15 RX ORDER — NITROGLYCERIN 0.4 MG/1
0.4 TABLET SUBLINGUAL EVERY 5 MIN PRN
Status: DISCONTINUED | OUTPATIENT
Start: 2021-10-15 | End: 2021-10-15 | Stop reason: HOSPADM

## 2021-10-15 RX ORDER — ONDANSETRON 2 MG/ML
4 INJECTION INTRAMUSCULAR; INTRAVENOUS
Status: COMPLETED | OUTPATIENT
Start: 2021-10-15 | End: 2021-10-15

## 2021-10-15 RX ADMIN — NITROGLYCERIN 0.4 MG: 0.4 TABLET, ORALLY DISINTEGRATING SUBLINGUAL at 01:10

## 2021-10-15 RX ADMIN — ONDANSETRON HYDROCHLORIDE 4 MG: 2 SOLUTION INTRAMUSCULAR; INTRAVENOUS at 01:10

## 2021-10-16 LAB
HCV AB SERPL QL IA: NEGATIVE
HIV 1+2 AB+HIV1 P24 AG SERPL QL IA: NEGATIVE

## 2022-02-21 DIAGNOSIS — E53.8 BIOTIN-(PROPIONYL-COA-CARBOXYLASE) LIGASE DEFICIENCY: Primary | ICD-10-CM

## 2022-02-22 ENCOUNTER — LAB VISIT (OUTPATIENT)
Dept: LAB | Facility: HOSPITAL | Age: 52
End: 2022-02-22
Attending: INTERNAL MEDICINE
Payer: COMMERCIAL

## 2022-02-22 DIAGNOSIS — E53.8 BIOTIN-(PROPIONYL-COA-CARBOXYLASE) LIGASE DEFICIENCY: ICD-10-CM

## 2022-02-22 PROCEDURE — 36415 COLL VENOUS BLD VENIPUNCTURE: CPT | Mod: PO | Performed by: INTERNAL MEDICINE

## 2022-02-22 PROCEDURE — 82607 VITAMIN B-12: CPT | Performed by: INTERNAL MEDICINE

## 2022-02-23 LAB — VIT B12 SERPL-MCNC: 728 PG/ML (ref 210–950)

## 2022-05-28 ENCOUNTER — HOSPITAL ENCOUNTER (EMERGENCY)
Facility: HOSPITAL | Age: 52
Discharge: HOME | End: 2022-05-28
Attending: EMERGENCY MEDICINE
Payer: COMMERCIAL

## 2022-05-28 VITALS
RESPIRATION RATE: 18 BRPM | HEIGHT: 66 IN | TEMPERATURE: 99 F | SYSTOLIC BLOOD PRESSURE: 120 MMHG | HEART RATE: 67 BPM | OXYGEN SATURATION: 95 % | DIASTOLIC BLOOD PRESSURE: 58 MMHG | BODY MASS INDEX: 30.4 KG/M2 | WEIGHT: 189.13 LBS

## 2022-05-28 DIAGNOSIS — N64.89 SEROMA OF BREAST: Primary | ICD-10-CM

## 2022-05-28 PROCEDURE — 99284 EMERGENCY DEPT VISIT MOD MDM: CPT | Mod: ER

## 2022-05-28 PROCEDURE — 25000003 PHARM REV CODE 250: Mod: ER | Performed by: EMERGENCY MEDICINE

## 2022-05-28 RX ORDER — CYCLOBENZAPRINE HCL 10 MG
TABLET ORAL
COMMUNITY
Start: 2022-02-01

## 2022-05-28 RX ORDER — RIZATRIPTAN BENZOATE 10 MG/1
10 TABLET ORAL
COMMUNITY

## 2022-05-28 RX ORDER — HYDROCODONE BITARTRATE AND ACETAMINOPHEN 5; 325 MG/1; MG/1
1 TABLET ORAL EVERY 4 HOURS PRN
COMMUNITY
Start: 2022-05-18 | End: 2022-05-28

## 2022-05-28 RX ORDER — ESTRADIOL 0.1 MG/G
2 CREAM VAGINAL
COMMUNITY
Start: 2022-04-04

## 2022-05-28 RX ORDER — METOPROLOL SUCCINATE 100 MG/1
100 TABLET, EXTENDED RELEASE ORAL DAILY
COMMUNITY
Start: 2022-02-15

## 2022-05-28 RX ORDER — SOLIFENACIN SUCCINATE 5 MG/1
5 TABLET, FILM COATED ORAL DAILY
COMMUNITY
Start: 2022-05-12

## 2022-05-28 RX ORDER — OMEPRAZOLE 40 MG/1
40 CAPSULE, DELAYED RELEASE ORAL
COMMUNITY
Start: 2022-01-11

## 2022-05-28 RX ORDER — DICLOFENAC SODIUM 30 MG/G
GEL TOPICAL
COMMUNITY
Start: 2022-03-01

## 2022-05-28 RX ORDER — NITROGLYCERIN 0.4 MG/1
TABLET SUBLINGUAL
COMMUNITY
Start: 2022-01-25

## 2022-05-28 RX ORDER — TOPIRAMATE 200 MG/1
200 TABLET ORAL
COMMUNITY

## 2022-05-28 RX ORDER — OXYCODONE AND ACETAMINOPHEN 10; 325 MG/1; MG/1
1 TABLET ORAL
Status: COMPLETED | OUTPATIENT
Start: 2022-05-28 | End: 2022-05-28

## 2022-05-28 RX ORDER — ETODOLAC 400 MG/1
400 TABLET, FILM COATED ORAL 2 TIMES DAILY
COMMUNITY
Start: 2022-01-31

## 2022-05-28 RX ORDER — FLUCONAZOLE 150 MG/1
TABLET ORAL
COMMUNITY

## 2022-05-28 RX ORDER — IBUPROFEN 800 MG/1
800 TABLET ORAL EVERY 8 HOURS
COMMUNITY
Start: 2022-05-23

## 2022-05-28 RX ORDER — METHOCARBAMOL 750 MG/1
750 TABLET, FILM COATED ORAL
COMMUNITY

## 2022-05-28 RX ORDER — HYDROCODONE BITARTRATE AND ACETAMINOPHEN 5; 325 MG/1; MG/1
1 TABLET ORAL EVERY 4 HOURS PRN
Qty: 18 TABLET | Refills: 0 | Status: SHIPPED | OUTPATIENT
Start: 2022-05-28 | End: 2023-05-17 | Stop reason: CLARIF

## 2022-05-28 RX ORDER — CLINDAMYCIN HYDROCHLORIDE 150 MG/1
300 CAPSULE ORAL 4 TIMES DAILY
Qty: 40 CAPSULE | Refills: 0 | Status: SHIPPED | OUTPATIENT
Start: 2022-05-28 | End: 2022-06-02

## 2022-05-28 RX ORDER — GABAPENTIN 300 MG/1
300 CAPSULE ORAL
COMMUNITY
Start: 2022-05-23

## 2022-05-28 RX ORDER — TRAZODONE HYDROCHLORIDE 100 MG/1
100 TABLET ORAL
COMMUNITY

## 2022-05-28 RX ADMIN — OXYCODONE AND ACETAMINOPHEN 1 TABLET: 10; 325 TABLET ORAL at 09:05

## 2022-05-29 NOTE — DISCHARGE INSTRUCTIONS
Driving or other activities under influence of medications - Patient and/or family/caretaker was given a prescription for, or instructed to use a medicine that may impair ability to drive, operate machinery, or participate in other potentially dangerous activities.  Patient was instructed not to participate in these activities while under the influence of these medications.    I discussed wound care precautions; specifically, that all wounds have risk of infection despite efforts to cleanse and debride the wound; and there is a risk of an occult foreign body (and thus increased risk of infection) despite a negative examination.  I discussed with patient need to return for any signs of infection, specifically redness, increased pain, fever, drainage of pus, or any concern, immediately.

## 2022-05-29 NOTE — ED PROVIDER NOTES
"Encounter Date: 5/28/2022       History     Chief Complaint   Patient presents with    Wound Check     Pt had a lumpectomy on left breast surrounding the upper nipple on May 18th; pt reports incision site to be red, swollen, and warm to touch; today incision started bleeding, loss aprox 200ml of blood; pts sister reports the blood being dark with no odor; bleeding under control at this time      The history is provided by the patient.   Wound Check   Treatments tried: pt had left lumpectomy at [facility] removed about 3 weeks ago and has developed a seroma. mild sense of "fullness in area with dark fluid draining from medial incision. no overt wosening of tenderness. no odor. no surrounding erythema/edema. There has been bloody (dark) discharge from the wound. There is no redness present. There is no swelling present. The pain has improved. She has no difficulty moving the affected extremity or digit.     Review of patient's allergies indicates:   Allergen Reactions    Ace inhibitors Swelling    Hydrochlorothiazide     Sulfa (sulfonamide antibiotics) Hives    Sulfur soap      Past Medical History:   Diagnosis Date    Anxiety     CHF (congestive heart failure)     Coronary artery disease     GERD (gastroesophageal reflux disease)     Hypertension     MI, old      Past Surgical History:   Procedure Laterality Date    BLADDER REPAIR      CARDIAC CATHETERIZATION      CHOLECYSTECTOMY      CORONARY ANGIOPLASTY WITH STENT PLACEMENT      HYSTERECTOMY      STOMACH SURGERY      Gastric sleeve     History reviewed. No pertinent family history.  Social History     Tobacco Use    Smoking status: Never Smoker    Smokeless tobacco: Never Used   Substance Use Topics    Alcohol use: Yes     Comment: daily, wine    Drug use: No     Review of Systems   Constitutional: Negative for fever.   HENT: Negative for sore throat.    Respiratory: Negative for shortness of breath.    Cardiovascular: Negative for chest pain. "   Gastrointestinal: Negative for nausea.   Genitourinary: Negative for dysuria.   Musculoskeletal: Negative for back pain.   Skin: Negative for rash.   Neurological: Negative for weakness.   Hematological: Does not bruise/bleed easily.   All other systems reviewed and are negative.      Physical Exam     Initial Vitals [05/28/22 2039]   BP Pulse Resp Temp SpO2   (!) 143/83 81 19 98.7 °F (37.1 °C) 96 %      MAP       --         Physical Exam    Nursing note and vitals reviewed.  Constitutional: She appears well-developed and well-nourished.   HENT:   Head: Normocephalic and atraumatic.   Mouth/Throat: No oropharyngeal exudate.   Eyes: Conjunctivae and EOM are normal. Pupils are equal, round, and reactive to light.   Neck: Neck supple. No thyromegaly present.   Normal range of motion.  Cardiovascular: Normal rate, regular rhythm, normal heart sounds and intact distal pulses. Exam reveals no gallop and no friction rub.    No murmur heard.  Pulmonary/Chest: Breath sounds normal. No respiratory distress. She has no wheezes. She has no rhonchi. She exhibits no tenderness.   Abdominal: Abdomen is soft. Bowel sounds are normal. She exhibits no distension. There is no abdominal tenderness. There is no rebound and no guarding.   Musculoskeletal:         General: No tenderness or edema. Normal range of motion.      Cervical back: Normal range of motion and neck supple.     Lymphadenopathy:     She has no cervical adenopathy.   Neurological: She is alert and oriented to person, place, and time. She has normal strength. No cranial nerve deficit or sensory deficit.   Skin: Skin is warm and dry. No rash noted.        Psychiatric: She has a normal mood and affect. Her behavior is normal. Judgment and thought content normal.         ED Course   Procedures  Labs Reviewed - No data to display       Imaging Results    None          Medications   oxyCODONE-acetaminophen  mg per tablet 1 tablet (1 tablet Oral Given 5/28/22 2119)  "                      Vitals:    05/28/22 2039 05/28/22 2119 05/28/22 2253   BP: (!) 143/83  (!) 120/58   Pulse: 81  67   Resp: 19 18 18   Temp: 98.7 °F (37.1 °C)     TempSrc: Oral     SpO2: 96%  95%   Weight: 85.8 kg (189 lb 2.5 oz)     Height: 5' 6" (1.676 m)         Results for orders placed or performed in visit on 02/22/22   VITAMIN B12   Result Value Ref Range    Vitamin B-12 728 210 - 950 pg/mL         Imaging Results    None         Medications   oxyCODONE-acetaminophen  mg per tablet 1 tablet (1 tablet Oral Given 5/28/22 2119)         10:47 PM - Re-evaluation: The patient is resting comfortably and is in no acute distress. She states that her symptoms have improved after treatment within ER. Discussed test results, shared treatment plan, specific conditions for return, and importance of follow up with patient and family.  Advised close f/u c pcp/surgeon within one week and to return to ER if symptoms persist or worsen.  She understands and agrees with the plan as discussed. Answered  her questions at this time. She has remained hemodynamically stable throughout the ED course and is appropriate for discharge home.     Driving or other activities under influence of medications - Patient and/or family/caretaker was given a prescription for, or instructed to use a medicine that may impair ability to drive, operate machinery, or participate in other potentially dangerous activities.  Patient was instructed not to participate in these activities while under the influence of these medications.    I discussed wound care precautions; specifically, that all wounds have risk of infection despite efforts to cleanse and debride the wound; and there is a risk of an occult foreign body (and thus increased risk of infection) despite a negative examination.  I discussed with patient need to return for any signs of infection, specifically redness, increased pain, fever, drainage of pus, or any concern, " immediately.    I discussed wound care precautions; specifically, that all wounds have risk of infection despite efforts to cleanse and debride the wound; and there is a risk of an occult foreign body (and thus increased risk of infection) despite a negative examination.  I discussed with patient need to return for any signs of infection, specifically redness, increased pain, fever, drainage of pus, or any concern, immediately.    Pre-hypertension/Hypertension: The pt has been informed that they may have pre-hypertension or hypertension based on a blood pressure reading in the ED. I recommend that the pt call the PCP listed on their discharge instructions or a physician of their choice this week to arrange f/u for further evaluation of possible pre-hypertension or hypertension.     Kasie Quevedo was given a handout which discussed their disease process, precautions, and instructions for follow-up and therapy.     Follow-up Information     Rigo Sultana MD. Schedule an appointment as soon as possible for a visit in 1 week.    Specialty: Family Medicine  Why: and keep postop apt as scheduled  Contact information:  [address] [facility] [place] [address]  [phone]             [facility] - Emergency Dept.    Specialty: Emergency Medicine  Why: As needed, If symptoms worsen  Contact information:  [address]  [place] [address] [phone]                          Medication List      START taking these medications    clindamycin 150 MG capsule  Commonly known as: CLEOCIN  Take 2 capsules (300 mg total) by mouth 4 (four) times daily. for 5 days        CHANGE how you take these medications    HYDROcodone-acetaminophen 5-325 mg per tablet  Commonly known as: NORCO  Take 1 tablet by mouth every 4 (four) hours as needed for Pain.  What changed: reasons to take this        ASK your doctor about these medications    * aspirin 81 MG EC tablet  Commonly known as: ECOTRIN      * aspirin 81 mg Cap     bumetanide 2 MG tablet  Commonly known as: BUMEX     busPIRone 15 MG tablet  Commonly known as: BUSPAR     calcium citrate 200 mg (950 mg) tablet  Commonly known as: CALCITRATE     citalopram 20 MG tablet  Commonly known as: CeleXA     clopidogreL 75 mg tablet  Commonly known as: PLAVIX     cyclobenzaprine 10 MG tablet  Commonly known as: FLEXERIL     diclofenac sodium 3 % gel  Commonly known as: SOLARAZE     estradioL 0.01 % (0.1 mg/gram) vaginal cream  Commonly known as: ESTRACE     etodolac 400 MG tablet  Commonly known as: LODINE     fluconazole 150 MG Tab  Commonly known as: DIFLUCAN     gabapentin 300 MG capsule  Commonly known as: NEURONTIN     galcanezumab-gnlm 120 mg/mL Pnij     ibuprofen 800 MG tablet  Commonly known as: ADVIL,MOTRIN     loratadine 10 mg tablet  Commonly known as: CLARITIN     losartan 50 MG tablet  Commonly known as: COZAAR     methocarbamoL 750 MG Tab  Commonly known as: ROBAXIN     metoprolol succinate 100 MG 24 hr tablet  Commonly known as: TOPROL-XL  Ask about: Which instructions should I use?     modafiniL 200 MG Tab  Commonly known as: PROVIGIL     multivitamin capsule     nitroGLYCERIN 0.4 MG SL tablet  Commonly known as: NITROSTAT     omeprazole 40 MG capsule  Commonly known as: PRILOSEC  Ask about: Which instructions should I use?     potassium chloride SA 20 MEQ tablet  Commonly known as: K-DUR,KLOR-CON     rizatriptan 10 MG tablet  Commonly known as: MAXALT     solifenacin 5 MG tablet  Commonly known as: VESICARE     topiramate 200 MG Tab  Commonly known as: TOPAMAX     traMADoL 50 mg tablet  Commonly known as: ULTRAM     traZODone 100 MG tablet  Commonly known as: DESYREL     valsartan 80 MG tablet  Commonly known as: DIOVAN     VITAMIN B-12 1,000 mcg/mL injection  Generic drug: cyanocobalamin         * This list has 2 medication(s) that are the same as other medications prescribed for you. Read the directions carefully, and ask your doctor or other  care provider to review them with you.               Where to Get Your Medications      You can get these medications from any pharmacy    Bring a paper prescription for each of these medications  · clindamycin 150 MG capsule  · HYDROcodone-acetaminophen 5-325 mg per tablet        Current Discharge Medication List            ED Diagnosis  1. Seroma of breast             Clinical Impression:   Final diagnoses:  [N64.89] Seroma of breast (Primary)          ED Disposition Condition    Discharge Stable        ED Prescriptions     Medication Sig Dispense Start Date End Date Auth. Provider    clindamycin (CLEOCIN) 150 MG capsule Take 2 capsules (300 mg total) by mouth 4 (four) times daily. for 5 days 40 capsule 5/28/2022 6/2/2022 Peter Morris Jr., MD    HYDROcodone-acetaminophen (NORCO) 5-325 mg per tablet Take 1 tablet by mouth every 4 (four) hours as needed for Pain. 18 tablet 5/28/2022  Peter Morris Jr., MD        Follow-up Information     Follow up With Specialties Details Why Contact Info    Rigo Sultana MD Family Medicine Schedule an appointment as soon as possible for a visit in 1 week and keep postop apt as scheduled [address] [facility]  [place] [address]  [phone]      [facility] - Emergency Dept Emergency Medicine  As needed, If symptoms worsen [address]  [place] [address] [phone]           Peter Morris Jr., MD  05/29/22 0117

## 2022-05-29 NOTE — ED TRIAGE NOTES
Had a lumpectomy on the 18th of May on the left breast. Today, it started to bleed. Denies pain at this time. Denies N/V.

## 2022-07-19 ENCOUNTER — HOSPITAL ENCOUNTER (EMERGENCY)
Facility: HOSPITAL | Age: 52
Discharge: HOME | End: 2022-07-20
Attending: EMERGENCY MEDICINE
Payer: COMMERCIAL

## 2022-07-19 VITALS
RESPIRATION RATE: 20 BRPM | TEMPERATURE: 99 F | WEIGHT: 184.88 LBS | DIASTOLIC BLOOD PRESSURE: 69 MMHG | HEART RATE: 112 BPM | OXYGEN SATURATION: 96 % | BODY MASS INDEX: 29.84 KG/M2 | SYSTOLIC BLOOD PRESSURE: 172 MMHG

## 2022-07-19 DIAGNOSIS — U07.1 COVID: Primary | ICD-10-CM

## 2022-07-19 LAB
BACTERIA #/AREA URNS AUTO: ABNORMAL /HPF
BILIRUB UR QL STRIP: NEGATIVE
CLARITY UR REFRACT.AUTO: ABNORMAL
COLOR UR AUTO: YELLOW
CTP QC/QA: YES
GLUCOSE UR QL STRIP: NEGATIVE
HGB UR QL STRIP: ABNORMAL
KETONES UR QL STRIP: NEGATIVE
LEUKOCYTE ESTERASE UR QL STRIP: NEGATIVE
MICROSCOPIC COMMENT: ABNORMAL
NITRITE UR QL STRIP: NEGATIVE
PH UR STRIP: 6 [PH] (ref 5–8)
PROT UR QL STRIP: NEGATIVE
RBC #/AREA URNS AUTO: 1 /HPF (ref 0–4)
SARS-COV-2 RDRP RESP QL NAA+PROBE: POSITIVE
SP GR UR STRIP: 1.02 (ref 1–1.03)
SQUAMOUS #/AREA URNS AUTO: 6 /HPF
URN SPEC COLLECT METH UR: ABNORMAL
UROBILINOGEN UR STRIP-ACNC: NEGATIVE EU/DL
WBC #/AREA URNS AUTO: 1 /HPF (ref 0–5)

## 2022-07-19 PROCEDURE — 81000 URINALYSIS NONAUTO W/SCOPE: CPT | Mod: ER | Performed by: EMERGENCY MEDICINE

## 2022-07-19 PROCEDURE — 99283 EMERGENCY DEPT VISIT LOW MDM: CPT | Mod: ER

## 2022-07-19 PROCEDURE — U0002 COVID-19 LAB TEST NON-CDC: HCPCS | Mod: ER | Performed by: EMERGENCY MEDICINE

## 2022-07-20 ENCOUNTER — PATIENT MESSAGE (OUTPATIENT)
Dept: INFECTIOUS DISEASES | Facility: HOSPITAL | Age: 52
End: 2022-07-20
Payer: COMMERCIAL

## 2022-07-20 NOTE — ED PROVIDER NOTES
"Encounter Date: 7/19/2022       History     Chief Complaint   Patient presents with    COVID-19 Concerns     States feeling "not well" and unsure if exposed to covid.     Urinary Tract Infection     States feeling like having a bladder infection. States burning with urinating that began 2 days ago       Urinary Tract Infection  This is a new problem. The current episode started 2 days ago. The problem occurs daily. The problem has not changed since onset.Pertinent negatives include no chest pain, no abdominal pain, no headaches and no shortness of breath. Nothing aggravates the symptoms. Nothing relieves the symptoms.   Pt concerned about covid but reports dysuria for 2 days.    Review of patient's allergies indicates:   Allergen Reactions    Ace inhibitors Swelling    Hydrochlorothiazide     Sulfa (sulfonamide antibiotics) Hives    Sulfur soap      Past Medical History:   Diagnosis Date    Anxiety     CHF (congestive heart failure)     Coronary artery disease     GERD (gastroesophageal reflux disease)     Hypertension     MI, old      Past Surgical History:   Procedure Laterality Date    BLADDER REPAIR      CARDIAC CATHETERIZATION      CHOLECYSTECTOMY      CORONARY ANGIOPLASTY WITH STENT PLACEMENT      HYSTERECTOMY      STOMACH SURGERY      Gastric sleeve     History reviewed. No pertinent family history.  Social History     Tobacco Use    Smoking status: Never Smoker    Smokeless tobacco: Never Used   Substance Use Topics    Alcohol use: Yes     Comment: daily, wine    Drug use: No     Review of Systems   Constitutional: Positive for chills. Negative for fever.   HENT: Negative.  Negative for congestion.    Respiratory: Negative for shortness of breath.    Cardiovascular: Negative for chest pain.   Gastrointestinal: Negative for abdominal pain.   Genitourinary: Positive for dysuria. Negative for pelvic pain.   Neurological: Negative for headaches.   All other systems reviewed and are " negative.      Physical Exam     Initial Vitals [07/19/22 2240]   BP Pulse Resp Temp SpO2   (!) 172/69 (!) 112 20 98.5 °F (36.9 °C) 96 %      MAP       --         Physical Exam    Nursing note and vitals reviewed.  Constitutional: She appears well-developed and well-nourished. No distress.   HENT:   Head: Normocephalic and atraumatic.   Mouth/Throat: Oropharynx is clear and moist.   Eyes: Conjunctivae and EOM are normal. Pupils are equal, round, and reactive to light.   Neck: Neck supple.   Normal range of motion.  Cardiovascular: Normal rate, regular rhythm and normal heart sounds.   Pulmonary/Chest: Breath sounds normal. No respiratory distress.   Abdominal: Abdomen is soft. Bowel sounds are normal. She exhibits no distension. There is no abdominal tenderness.   Musculoskeletal:         General: Normal range of motion.      Cervical back: Normal range of motion and neck supple.     Neurological: She is alert and oriented to person, place, and time. She has normal strength.   Skin: Skin is warm and dry.   Psychiatric: She has a normal mood and affect. Thought content normal.         ED Course   Procedures  Labs Reviewed   URINALYSIS, REFLEX TO URINE CULTURE - Abnormal; Notable for the following components:       Result Value    Appearance, UA Hazy (*)     Occult Blood UA 2+ (*)     All other components within normal limits    Narrative:     Specimen Source->Urine   URINALYSIS MICROSCOPIC - Abnormal; Notable for the following components:    Bacteria Few (*)     All other components within normal limits    Narrative:     Specimen Source->Urine   SARS-COV-2 RDRP GENE - Abnormal; Notable for the following components:    POC Rapid COVID Positive (*)     All other components within normal limits     Results for orders placed or performed during the hospital encounter of 07/19/22   Urinalysis, Reflex to Urine Culture Urine, Clean Catch    Specimen: Urine   Result Value Ref Range    Specimen UA Urine, Clean Catch      Color, UA Yellow Yellow, Straw, Kalli    Appearance, UA Hazy (A) Clear    pH, UA 6.0 5.0 - 8.0    Specific Gravity, UA 1.020 1.005 - 1.030    Protein, UA Negative Negative    Glucose, UA Negative Negative    Ketones, UA Negative Negative    Bilirubin (UA) Negative Negative    Occult Blood UA 2+ (A) Negative    Nitrite, UA Negative Negative    Urobilinogen, UA Negative <2.0 EU/dL    Leukocytes, UA Negative Negative   Urinalysis Microscopic   Result Value Ref Range    RBC, UA 1 0 - 4 /hpf    WBC, UA 1 0 - 5 /hpf    Bacteria Few (A) None-Occ /hpf    Squam Epithel, UA 6 /hpf    Microscopic Comment SEE COMMENT    POCT COVID-19 Rapid Screening   Result Value Ref Range    POC Rapid COVID Positive (A) Negative     Acceptable Yes             Imaging Results    None     11:55 PM - Counseling: Spoke with the patient and discussed todays findings, in addition to providing specific details for the plan of care and counseling regarding the diagnosis and prognosis. Questions are answered at this time. Pt with multiple drug interactions(Buspar/Trazodone) discussed with patient, will opt for infusion referral.       Medications - No data to display                       Clinical Impression:   Final diagnoses:  [U07.1] COVID (Primary)          ED Disposition Condition    Discharge Stable        ED Prescriptions     None        Follow-up Information     Follow up With Specialties Details Why Contact Info    Rigo Sultana MD Family Medicine Call in 2 days  [address] [facility] [address]  [phone]      [facility] - Emergency Dept Emergency Medicine  If symptoms worsen [address]  [facility] [address] [phone]           Andrew Funes MD  07/20/22 0000

## 2022-07-20 NOTE — ED NOTES
Patient examined, evaluated, and educated on discharge prescriptions and instructions by Dr Shantel MCCAIN. [facility] Shant[facility].

## 2022-07-22 ENCOUNTER — INFUSION (OUTPATIENT)
Dept: INFECTIOUS DISEASES | Facility: HOSPITAL | Age: 52
End: 2022-07-22
Attending: EMERGENCY MEDICINE
Payer: COMMERCIAL

## 2022-07-22 VITALS
OXYGEN SATURATION: 98 % | HEART RATE: 61 BPM | RESPIRATION RATE: 18 BRPM | SYSTOLIC BLOOD PRESSURE: 125 MMHG | DIASTOLIC BLOOD PRESSURE: 67 MMHG | TEMPERATURE: 98 F

## 2022-07-22 DIAGNOSIS — U07.1 COVID: Primary | ICD-10-CM

## 2022-07-22 PROCEDURE — 63600175 PHARM REV CODE 636 W HCPCS: Performed by: EMERGENCY MEDICINE

## 2022-07-22 PROCEDURE — M0222 HC IV INJECTION, BEBTELOVIMAB, INCL POST ADMIN MONIT: HCPCS | Performed by: EMERGENCY MEDICINE

## 2022-07-22 RX ORDER — ACETAMINOPHEN 325 MG/1
650 TABLET ORAL
Status: ACTIVE | OUTPATIENT
Start: 2022-07-22 | End: 2022-07-23

## 2022-07-22 RX ORDER — ONDANSETRON 4 MG/1
4 TABLET, ORALLY DISINTEGRATING ORAL
Status: ACTIVE | OUTPATIENT
Start: 2022-07-22 | End: 2022-07-23

## 2022-07-22 RX ORDER — EPINEPHRINE 0.3 MG/.3ML
0.3 INJECTION SUBCUTANEOUS
Status: ACTIVE | OUTPATIENT
Start: 2022-07-22 | End: 2022-07-25

## 2022-07-22 RX ORDER — DIPHENHYDRAMINE HYDROCHLORIDE 50 MG/ML
25 INJECTION INTRAMUSCULAR; INTRAVENOUS
Status: ACTIVE | OUTPATIENT
Start: 2022-07-22 | End: 2022-07-23

## 2022-07-22 RX ORDER — BEBTELOVIMAB 87.5 MG/ML
175 INJECTION, SOLUTION INTRAVENOUS
Status: COMPLETED | OUTPATIENT
Start: 2022-07-22 | End: 2022-07-22

## 2022-07-22 RX ORDER — ALBUTEROL SULFATE 90 UG/1
2 AEROSOL, METERED RESPIRATORY (INHALATION)
Status: ACTIVE | OUTPATIENT
Start: 2022-07-22 | End: 2022-07-25

## 2022-07-22 RX ADMIN — BEBTELOVIMAB 175 MG: 87.5 INJECTION, SOLUTION INTRAVENOUS at 11:07

## 2022-09-19 ENCOUNTER — LAB VISIT (OUTPATIENT)
Dept: LAB | Facility: HOSPITAL | Age: 52
End: 2022-09-19
Attending: ORTHOPAEDIC SURGERY
Payer: COMMERCIAL

## 2022-09-19 DIAGNOSIS — M54.16 LUMBAR RADICULOPATHY: ICD-10-CM

## 2022-09-19 DIAGNOSIS — Z79.01 LONG TERM (CURRENT) USE OF ANTICOAGULANTS: ICD-10-CM

## 2022-09-19 DIAGNOSIS — Z01.812 PRE-OPERATIVE LABORATORY EXAMINATION: ICD-10-CM

## 2022-09-19 LAB
APTT BLDCRRT: 28.8 SEC (ref 21–32)
BASOPHILS # BLD AUTO: 0.04 K/UL (ref 0–0.2)
BASOPHILS NFR BLD: 0.7 % (ref 0–1.9)
DIFFERENTIAL METHOD: NORMAL
EOSINOPHIL # BLD AUTO: 0.2 K/UL (ref 0–0.5)
EOSINOPHIL NFR BLD: 3.7 % (ref 0–8)
ERYTHROCYTE [DISTWIDTH] IN BLOOD BY AUTOMATED COUNT: 14.4 % (ref 11.5–14.5)
HCT VFR BLD AUTO: 38.1 % (ref 37–48.5)
HGB BLD-MCNC: 12.7 G/DL (ref 12–16)
IMM GRANULOCYTES # BLD AUTO: 0.01 K/UL (ref 0–0.04)
IMM GRANULOCYTES NFR BLD AUTO: 0.2 % (ref 0–0.5)
INR PPP: 1 (ref 0.8–1.2)
LYMPHOCYTES # BLD AUTO: 2.5 K/UL (ref 1–4.8)
LYMPHOCYTES NFR BLD: 45.6 % (ref 18–48)
MCH RBC QN AUTO: 29 PG (ref 27–31)
MCHC RBC AUTO-ENTMCNC: 33.3 G/DL (ref 32–36)
MCV RBC AUTO: 87 FL (ref 82–98)
MONOCYTES # BLD AUTO: 0.6 K/UL (ref 0.3–1)
MONOCYTES NFR BLD: 10.3 % (ref 4–15)
NEUTROPHILS # BLD AUTO: 2.2 K/UL (ref 1.8–7.7)
NEUTROPHILS NFR BLD: 39.5 % (ref 38–73)
NRBC BLD-RTO: 0 /100 WBC
PLATELET # BLD AUTO: 201 K/UL (ref 150–450)
PMV BLD AUTO: 10.5 FL (ref 9.2–12.9)
PROTHROMBIN TIME: 10.6 SEC (ref 9–12.5)
RBC # BLD AUTO: 4.38 M/UL (ref 4–5.4)
WBC # BLD AUTO: 5.46 K/UL (ref 3.9–12.7)

## 2022-09-19 PROCEDURE — 36415 COLL VENOUS BLD VENIPUNCTURE: CPT | Mod: PO | Performed by: ORTHOPAEDIC SURGERY

## 2022-09-19 PROCEDURE — 85610 PROTHROMBIN TIME: CPT | Mod: PO | Performed by: ORTHOPAEDIC SURGERY

## 2022-09-19 PROCEDURE — 85730 THROMBOPLASTIN TIME PARTIAL: CPT | Mod: PO | Performed by: ORTHOPAEDIC SURGERY

## 2022-09-19 PROCEDURE — 85025 COMPLETE CBC W/AUTO DIFF WBC: CPT | Mod: PO | Performed by: ORTHOPAEDIC SURGERY

## 2022-10-01 ENCOUNTER — HOSPITAL ENCOUNTER (EMERGENCY)
Facility: HOSPITAL | Age: 52
Discharge: ANOTHER HOSPITAL | End: 2022-10-01
Attending: EMERGENCY MEDICINE
Payer: COMMERCIAL

## 2022-10-01 VITALS
OXYGEN SATURATION: 95 % | RESPIRATION RATE: 22 BRPM | TEMPERATURE: 98 F | SYSTOLIC BLOOD PRESSURE: 122 MMHG | HEART RATE: 80 BPM | BODY MASS INDEX: 30.17 KG/M2 | DIASTOLIC BLOOD PRESSURE: 59 MMHG | WEIGHT: 187.75 LBS | HEIGHT: 66 IN

## 2022-10-01 DIAGNOSIS — R07.9 CHEST PAIN: ICD-10-CM

## 2022-10-01 DIAGNOSIS — R79.89 ELEVATED TROPONIN: ICD-10-CM

## 2022-10-01 DIAGNOSIS — R07.9 CHEST PAIN, UNSPECIFIED: Primary | ICD-10-CM

## 2022-10-01 LAB
ALBUMIN SERPL BCP-MCNC: 3.8 G/DL (ref 3.5–5.2)
ALP SERPL-CCNC: 66 U/L (ref 55–135)
ALT SERPL W/O P-5'-P-CCNC: 15 U/L (ref 10–44)
ANION GAP SERPL CALC-SCNC: 13 MMOL/L (ref 8–16)
AST SERPL-CCNC: 11 U/L (ref 10–40)
BASOPHILS # BLD AUTO: 0.05 K/UL (ref 0–0.2)
BASOPHILS NFR BLD: 0.6 % (ref 0–1.9)
BILIRUB SERPL-MCNC: 0.5 MG/DL (ref 0.1–1)
BNP SERPL-MCNC: 35 PG/ML (ref 0–99)
BUN SERPL-MCNC: 20 MG/DL (ref 6–20)
CALCIUM SERPL-MCNC: 9.1 MG/DL (ref 8.7–10.5)
CHLORIDE SERPL-SCNC: 104 MMOL/L (ref 95–110)
CO2 SERPL-SCNC: 24 MMOL/L (ref 23–29)
CREAT SERPL-MCNC: 1 MG/DL (ref 0.5–1.4)
DIFFERENTIAL METHOD: ABNORMAL
EOSINOPHIL # BLD AUTO: 0.1 K/UL (ref 0–0.5)
EOSINOPHIL NFR BLD: 1.8 % (ref 0–8)
ERYTHROCYTE [DISTWIDTH] IN BLOOD BY AUTOMATED COUNT: 14.9 % (ref 11.5–14.5)
EST. GFR  (NO RACE VARIABLE): >60 ML/MIN/1.73 M^2
GLUCOSE SERPL-MCNC: 120 MG/DL (ref 70–110)
HCT VFR BLD AUTO: 40.5 % (ref 37–48.5)
HGB BLD-MCNC: 13.4 G/DL (ref 12–16)
IMM GRANULOCYTES # BLD AUTO: 0.05 K/UL (ref 0–0.04)
IMM GRANULOCYTES NFR BLD AUTO: 0.6 % (ref 0–0.5)
LYMPHOCYTES # BLD AUTO: 2.4 K/UL (ref 1–4.8)
LYMPHOCYTES NFR BLD: 30.7 % (ref 18–48)
MCH RBC QN AUTO: 28.9 PG (ref 27–31)
MCHC RBC AUTO-ENTMCNC: 33.1 G/DL (ref 32–36)
MCV RBC AUTO: 88 FL (ref 82–98)
MONOCYTES # BLD AUTO: 0.7 K/UL (ref 0.3–1)
MONOCYTES NFR BLD: 9.3 % (ref 4–15)
NEUTROPHILS # BLD AUTO: 4.5 K/UL (ref 1.8–7.7)
NEUTROPHILS NFR BLD: 57 % (ref 38–73)
NRBC BLD-RTO: 0 /100 WBC
PLATELET # BLD AUTO: 222 K/UL (ref 150–450)
PMV BLD AUTO: 10.3 FL (ref 9.2–12.9)
POTASSIUM SERPL-SCNC: 3.5 MMOL/L (ref 3.5–5.1)
PROT SERPL-MCNC: 7.1 G/DL (ref 6–8.4)
RBC # BLD AUTO: 4.63 M/UL (ref 4–5.4)
SODIUM SERPL-SCNC: 141 MMOL/L (ref 136–145)
TROPONIN I SERPL DL<=0.01 NG/ML-MCNC: 0.03 NG/ML (ref 0–0.03)
WBC # BLD AUTO: 7.86 K/UL (ref 3.9–12.7)

## 2022-10-01 PROCEDURE — 93010 EKG 12-LEAD: ICD-10-PCS | Mod: 76,,, | Performed by: INTERNAL MEDICINE

## 2022-10-01 PROCEDURE — 83880 ASSAY OF NATRIURETIC PEPTIDE: CPT | Mod: ER | Performed by: EMERGENCY MEDICINE

## 2022-10-01 PROCEDURE — 96375 TX/PRO/DX INJ NEW DRUG ADDON: CPT | Mod: ER

## 2022-10-01 PROCEDURE — 85025 COMPLETE CBC W/AUTO DIFF WBC: CPT | Mod: ER | Performed by: EMERGENCY MEDICINE

## 2022-10-01 PROCEDURE — 25500020 PHARM REV CODE 255: Mod: ER | Performed by: EMERGENCY MEDICINE

## 2022-10-01 PROCEDURE — 99285 EMERGENCY DEPT VISIT HI MDM: CPT | Mod: 25,ER

## 2022-10-01 PROCEDURE — 93010 ELECTROCARDIOGRAM REPORT: CPT | Mod: ,,, | Performed by: INTERNAL MEDICINE

## 2022-10-01 PROCEDURE — 96374 THER/PROPH/DIAG INJ IV PUSH: CPT | Mod: ER

## 2022-10-01 PROCEDURE — 63600175 PHARM REV CODE 636 W HCPCS: Mod: ER | Performed by: EMERGENCY MEDICINE

## 2022-10-01 PROCEDURE — 84484 ASSAY OF TROPONIN QUANT: CPT | Mod: ER | Performed by: EMERGENCY MEDICINE

## 2022-10-01 PROCEDURE — 80053 COMPREHEN METABOLIC PANEL: CPT | Mod: ER | Performed by: EMERGENCY MEDICINE

## 2022-10-01 PROCEDURE — 93005 ELECTROCARDIOGRAM TRACING: CPT | Mod: ER

## 2022-10-01 PROCEDURE — 25000003 PHARM REV CODE 250: Mod: ER | Performed by: EMERGENCY MEDICINE

## 2022-10-01 RX ORDER — ONDANSETRON 2 MG/ML
4 INJECTION INTRAMUSCULAR; INTRAVENOUS
Status: COMPLETED | OUTPATIENT
Start: 2022-10-01 | End: 2022-10-01

## 2022-10-01 RX ORDER — MORPHINE SULFATE 4 MG/ML
4 INJECTION, SOLUTION INTRAMUSCULAR; INTRAVENOUS
Status: COMPLETED | OUTPATIENT
Start: 2022-10-01 | End: 2022-10-01

## 2022-10-01 RX ORDER — ASPIRIN 325 MG
325 TABLET ORAL
Status: COMPLETED | OUTPATIENT
Start: 2022-10-01 | End: 2022-10-01

## 2022-10-01 RX ADMIN — ASPIRIN 325 MG: 325 TABLET ORAL at 08:10

## 2022-10-01 RX ADMIN — NITROGLYCERIN 1 INCH: 20 OINTMENT TOPICAL at 09:10

## 2022-10-01 RX ADMIN — MORPHINE SULFATE 4 MG: 4 INJECTION INTRAVENOUS at 09:10

## 2022-10-01 RX ADMIN — ONDANSETRON 4 MG: 2 INJECTION INTRAMUSCULAR; INTRAVENOUS at 09:10

## 2022-10-01 RX ADMIN — IOHEXOL 100 ML: 350 INJECTION, SOLUTION INTRAVENOUS at 09:10

## 2022-10-02 NOTE — ED PROVIDER NOTES
Encounter Date: 10/1/2022       History     Chief Complaint   Patient presents with    Chest Pain     The history is provided by the patient.   Chest Pain  The current episode started two days ago. Chest pain occurs intermittently. The chest pain is unchanged. At its most intense, the chest pain is at 7/10. The chest pain is currently at 2/10. The quality of the pain is described as sharp. The pain does not radiate. Primary symptoms include palpitations. Pertinent negatives for primary symptoms include no fever, no fatigue, no syncope, no shortness of breath, no cough, no wheezing, no abdominal pain, no nausea, no vomiting, no dizziness and no altered mental status.   The palpitations did not occur with dizziness or shortness of breath. She tried nothing for the symptoms.   Review of patient's allergies indicates:   Allergen Reactions    Ace inhibitors Swelling    Hydrochlorothiazide     Sulfa (sulfonamide antibiotics) Hives    Sulfur soap      Past Medical History:   Diagnosis Date    Anxiety     CHF (congestive heart failure)     Coronary artery disease     GERD (gastroesophageal reflux disease)     Hypertension     Miocardial Infarction     2015     Past Surgical History:   Procedure Laterality Date    Aortic Dissection Repair      2015    BLADDER REPAIR      CARDIAC CATHETERIZATION      2015    CHOLECYSTECTOMY      CORONARY ANGIOPLASTY WITH STENT PLACEMENT      2015    HYSTERECTOMY      STOMACH SURGERY      Gastric sleeve     History reviewed. No pertinent family history.  Social History     Tobacco Use    Smoking status: Never    Smokeless tobacco: Never   Substance Use Topics    Alcohol use: Yes     Comment: daily, wine    Drug use: No     Review of Systems   Constitutional:  Negative for fatigue and fever.   Respiratory:  Negative for cough, shortness of breath and wheezing.    Cardiovascular:  Positive for chest pain and palpitations. Negative for syncope.   Gastrointestinal:  Negative for abdominal pain,  nausea and vomiting.   Neurological:  Negative for dizziness.   All other systems reviewed and are negative.    Physical Exam     Initial Vitals [10/01/22 1958]   BP Pulse Resp Temp SpO2   (!) 141/81 102 (!) 27 97.6 °F (36.4 °C) 98 %      MAP       --         Physical Exam    Nursing note and vitals reviewed.  Constitutional: She appears well-developed and well-nourished. No distress.   HENT:   Head: Normocephalic and atraumatic.   Mouth/Throat: Oropharynx is clear and moist.   Eyes: Conjunctivae and EOM are normal. Pupils are equal, round, and reactive to light.   Neck: Neck supple.   Normal range of motion.  Cardiovascular:  Normal rate, regular rhythm and normal heart sounds.           Pulmonary/Chest: Breath sounds normal. No respiratory distress.   Abdominal: Abdomen is soft. Bowel sounds are normal. She exhibits no distension. There is no abdominal tenderness.   Musculoskeletal:         General: Normal range of motion.      Cervical back: Normal range of motion and neck supple.     Neurological: She is alert and oriented to person, place, and time. She has normal strength.   Skin: Skin is warm and dry.   Psychiatric: Thought content normal. Her mood appears anxious.       ED Course   Procedures  Labs Reviewed   CBC W/ AUTO DIFFERENTIAL - Abnormal; Notable for the following components:       Result Value    RDW 14.9 (*)     Immature Granulocytes 0.6 (*)     Immature Grans (Abs) 0.05 (*)     All other components within normal limits   COMPREHENSIVE METABOLIC PANEL - Abnormal; Notable for the following components:    Glucose 120 (*)     All other components within normal limits   TROPONIN I - Abnormal; Notable for the following components:    Troponin I 0.034 (*)     All other components within normal limits   B-TYPE NATRIURETIC PEPTIDE     Results for orders placed or performed during the hospital encounter of 10/01/22   CBC auto differential   Result Value Ref Range    WBC 7.86 3.90 - 12.70 K/uL    RBC 4.63  4.00 - 5.40 M/uL    Hemoglobin 13.4 12.0 - 16.0 g/dL    Hematocrit 40.5 37.0 - 48.5 %    MCV 88 82 - 98 fL    MCH 28.9 27.0 - 31.0 pg    MCHC 33.1 32.0 - 36.0 g/dL    RDW 14.9 (H) 11.5 - 14.5 %    Platelets 222 150 - 450 K/uL    MPV 10.3 9.2 - 12.9 fL    Immature Granulocytes 0.6 (H) 0.0 - 0.5 %    Gran # (ANC) 4.5 1.8 - 7.7 K/uL    Immature Grans (Abs) 0.05 (H) 0.00 - 0.04 K/uL    Lymph # 2.4 1.0 - 4.8 K/uL    Mono # 0.7 0.3 - 1.0 K/uL    Eos # 0.1 0.0 - 0.5 K/uL    Baso # 0.05 0.00 - 0.20 K/uL    nRBC 0 0 /100 WBC    Gran % 57.0 38.0 - 73.0 %    Lymph % 30.7 18.0 - 48.0 %    Mono % 9.3 4.0 - 15.0 %    Eosinophil % 1.8 0.0 - 8.0 %    Basophil % 0.6 0.0 - 1.9 %    Differential Method Automated    Comprehensive metabolic panel   Result Value Ref Range    Sodium 141 136 - 145 mmol/L    Potassium 3.5 3.5 - 5.1 mmol/L    Chloride 104 95 - 110 mmol/L    CO2 24 23 - 29 mmol/L    Glucose 120 (H) 70 - 110 mg/dL    BUN 20 6 - 20 mg/dL    Creatinine 1.0 0.5 - 1.4 mg/dL    Calcium 9.1 8.7 - 10.5 mg/dL    Total Protein 7.1 6.0 - 8.4 g/dL    Albumin 3.8 3.5 - 5.2 g/dL    Total Bilirubin 0.5 0.1 - 1.0 mg/dL    Alkaline Phosphatase 66 55 - 135 U/L    AST 11 10 - 40 U/L    ALT 15 10 - 44 U/L    Anion Gap 13 8 - 16 mmol/L    eGFR >60.0 >60 mL/min/1.73 m^2   Troponin I #1   Result Value Ref Range    Troponin I 0.034 (H) 0.000 - 0.026 ng/mL   BNP   Result Value Ref Range    BNP 35 0 - 99 pg/mL       EKG Readings: (Independently Interpreted)   Initial Reading: No STEMI. Rhythm: Normal Sinus Rhythm. Heart Rate: 95. ST Segments: Normal ST Segments. T Waves: Normal. Axis: Left Axis Deviation. Clinical Impression: Normal Sinus Rhythm     Imaging Results              CTA Chest Abdomen Pelvis (Final result)  Result time 10/01/22 21:37:44      Final result by Baldomero Lou MD (10/01/22 21:37:44)                   Impression:      No evidence for aortic dissection or aneurysm.    Mild atherosclerotic plaque    Status post  cholecystectomy.    Question enhancing nodule in the left breast. Recommend correlation to mammograms and a non emergent basis    All CT scans at this facility are performed  using dose modulation techniques as appropriate to performed exam including the following:  automated exposure control; adjustment of mA and/or kV according to the patients size (this includes techniques or standardized protocols for targeted exams where dose is matched to indication/reason for exam: i.e. extremities or head);  iterative reconstruction technique.      Electronically signed by: Carmine Alexandre  Date:    10/01/2022  Time:    21:37               Narrative:    EXAMINATION:  CTA CHEST ABDOMEN PELVIS    CLINICAL HISTORY:  Aortic aneurysm, known or suspected;    TECHNIQUE:  CT chest abdomen pelvis with and without contrast    COMPARISON:  None    FINDINGS:  Ascending aorta measures 3.7 cm in AP dimension.  Coronary artery calcification noted.  Mild subsegmental atelectasis.  No focal consolidation pleural effusion or pneumothorax.    No evidence for aortic dissection.  Status post gastric bypass.  Cholecystectomy.  No evidence for abdominal aortic aneurysm.  Mild atherosclerotic changes of the left common iliac artery is identified.  Lungs are essentially clear with subsegmental atelectasis.  No evidence for adenopathy.    Multiple bilateral renal cysts right greater than left.  Pancreas is grossly unremarkable.  Bladder is grossly unremarkable.  Patient appears status post hysterectomy.  Correlate clinically.  No free fluid or bowel obstruction.  Mild atherosclerotic changes of the abdominal aorta.  Excretory phase imaging on delayed imaging.  Pancreas is grossly unremarkable.  No evidence for pulmonary emboli as far seen.  Mild left atrial enlargement.  Question enhancing nodule in the left breast.  Recommend correlation to mammograms and a non emergent basis                                       X-Ray Chest 1 View (Final result)   Result time 10/01/22 20:35:42      Final result by Baldomero Lou MD (10/01/22 20:35:42)                   Impression:      No acute abnormality.      Electronically signed by: Carmine Alexandre  Date:    10/01/2022  Time:    20:35               Narrative:    EXAMINATION:  XR CHEST 1 VIEW    CLINICAL HISTORY:  Chest pain, unspecified    TECHNIQUE:  Single frontal view of the chest was performed.    COMPARISON:  None    FINDINGS:  The lungs are clear, with normal appearance of pulmonary vasculature and no pleural effusion or pneumothorax.    The cardiac silhouette is normal in size. The hilar and mediastinal contours are unremarkable.    Bones are intact.                                  2130 Pt with recurrent CP- will tx c Morphine, NTP- repeat EKG 21:39 NSR 72 Nonspecific T wave abnl  10:06 PM Discussed lab/imaging studies with patient and the need for further evaluation/admission for CP. Offered pt admission to [facility] but she request transfer to [facility] as her Cardiologist is Dr Bobby. Pt verbalized understanding that this is a stand alone ER and we are unable to admit at this facility. Pt will be transferred to [facility] via [facility] with care en route to include CM. I discussed this case with HS and care was accepted by Dr Damon.       Medications   aspirin tablet 325 mg (325 mg Oral Given 10/1/22 2012)   iohexoL (OMNIPAQUE 350) injection 100 mL (100 mLs Intravenous Given 10/1/22 2111)   morphine injection 4 mg (4 mg Intravenous Given 10/1/22 2139)   ondansetron injection 4 mg (4 mg Intravenous Given 10/1/22 2140)   nitroGLYCERIN 2% TD oint ointment 1 inch (1 inch Transdermal Given 10/1/22 2139)                              Clinical Impression:   Final diagnoses:  [R07.9] Chest pain  [R07.9] Chest pain, unspecified (Primary)  [R77.8] Elevated troponin        ED Disposition Condition    Transfer to Another Facility Stable                Andrew Funes MD  10/01/22 2207

## 2023-03-26 ENCOUNTER — HOSPITAL ENCOUNTER (EMERGENCY)
Facility: HOSPITAL | Age: 53
Discharge: HOME | End: 2023-03-26
Attending: EMERGENCY MEDICINE
Payer: COMMERCIAL

## 2023-03-26 VITALS
HEART RATE: 64 BPM | BODY MASS INDEX: 29.86 KG/M2 | RESPIRATION RATE: 20 BRPM | WEIGHT: 185 LBS | TEMPERATURE: 98 F | DIASTOLIC BLOOD PRESSURE: 65 MMHG | OXYGEN SATURATION: 99 % | SYSTOLIC BLOOD PRESSURE: 122 MMHG

## 2023-03-26 DIAGNOSIS — H93.12 TINNITUS OF LEFT EAR: Primary | ICD-10-CM

## 2023-03-26 DIAGNOSIS — R53.1 WEAKNESS: ICD-10-CM

## 2023-03-26 DIAGNOSIS — R42 VERTIGO: ICD-10-CM

## 2023-03-26 LAB
ALBUMIN SERPL BCP-MCNC: 4 G/DL (ref 3.5–5.2)
ALP SERPL-CCNC: 68 U/L (ref 55–135)
ALT SERPL W/O P-5'-P-CCNC: 13 U/L (ref 10–44)
ANION GAP SERPL CALC-SCNC: 13 MMOL/L (ref 8–16)
AST SERPL-CCNC: 13 U/L (ref 10–40)
BACTERIA #/AREA URNS AUTO: ABNORMAL /HPF
BASOPHILS # BLD AUTO: 0.01 K/UL (ref 0–0.2)
BASOPHILS NFR BLD: 0.2 % (ref 0–1.9)
BILIRUB SERPL-MCNC: 0.4 MG/DL (ref 0.1–1)
BILIRUB UR QL STRIP: NEGATIVE
BNP SERPL-MCNC: 56 PG/ML (ref 0–99)
BUN SERPL-MCNC: 11 MG/DL (ref 6–20)
CALCIUM SERPL-MCNC: 9.7 MG/DL (ref 8.7–10.5)
CHLORIDE SERPL-SCNC: 104 MMOL/L (ref 95–110)
CLARITY UR REFRACT.AUTO: ABNORMAL
CO2 SERPL-SCNC: 26 MMOL/L (ref 23–29)
COLOR UR AUTO: YELLOW
CREAT SERPL-MCNC: 0.9 MG/DL (ref 0.5–1.4)
CTP QC/QA: YES
DIFFERENTIAL METHOD: NORMAL
EOSINOPHIL # BLD AUTO: 0.1 K/UL (ref 0–0.5)
EOSINOPHIL NFR BLD: 2.2 % (ref 0–8)
ERYTHROCYTE [DISTWIDTH] IN BLOOD BY AUTOMATED COUNT: 14.1 % (ref 11.5–14.5)
EST. GFR  (NO RACE VARIABLE): >60 ML/MIN/1.73 M^2
GLUCOSE SERPL-MCNC: 125 MG/DL (ref 70–110)
GLUCOSE UR QL STRIP: NEGATIVE
HCT VFR BLD AUTO: 38.8 % (ref 37–48.5)
HGB BLD-MCNC: 12.8 G/DL (ref 12–16)
HGB UR QL STRIP: ABNORMAL
IMM GRANULOCYTES # BLD AUTO: 0.01 K/UL (ref 0–0.04)
IMM GRANULOCYTES NFR BLD AUTO: 0.2 % (ref 0–0.5)
KETONES UR QL STRIP: NEGATIVE
LEUKOCYTE ESTERASE UR QL STRIP: ABNORMAL
LYMPHOCYTES # BLD AUTO: 1.4 K/UL (ref 1–4.8)
LYMPHOCYTES NFR BLD: 30.6 % (ref 18–48)
MAGNESIUM SERPL-MCNC: 1.9 MG/DL (ref 1.6–2.6)
MCH RBC QN AUTO: 28.8 PG (ref 27–31)
MCHC RBC AUTO-ENTMCNC: 33 G/DL (ref 32–36)
MCV RBC AUTO: 87 FL (ref 82–98)
MICROSCOPIC COMMENT: ABNORMAL
MONOCYTES # BLD AUTO: 0.3 K/UL (ref 0.3–1)
MONOCYTES NFR BLD: 6.4 % (ref 4–15)
NEUTROPHILS # BLD AUTO: 2.7 K/UL (ref 1.8–7.7)
NEUTROPHILS NFR BLD: 60.4 % (ref 38–73)
NITRITE UR QL STRIP: POSITIVE
NRBC BLD-RTO: 0 /100 WBC
PH UR STRIP: 6 [PH] (ref 5–8)
PLATELET # BLD AUTO: 196 K/UL (ref 150–450)
PMV BLD AUTO: 11.2 FL (ref 9.2–12.9)
POTASSIUM SERPL-SCNC: 3.7 MMOL/L (ref 3.5–5.1)
PROT SERPL-MCNC: 7.3 G/DL (ref 6–8.4)
PROT UR QL STRIP: NEGATIVE
RBC # BLD AUTO: 4.44 M/UL (ref 4–5.4)
RBC #/AREA URNS AUTO: 1 /HPF (ref 0–4)
SARS-COV-2 RDRP RESP QL NAA+PROBE: NEGATIVE
SODIUM SERPL-SCNC: 143 MMOL/L (ref 136–145)
SP GR UR STRIP: 1.02 (ref 1–1.03)
SQUAMOUS #/AREA URNS AUTO: 4 /HPF
TROPONIN I SERPL DL<=0.01 NG/ML-MCNC: 0.02 NG/ML (ref 0–0.03)
URN SPEC COLLECT METH UR: ABNORMAL
UROBILINOGEN UR STRIP-ACNC: NEGATIVE EU/DL
WBC # BLD AUTO: 4.54 K/UL (ref 3.9–12.7)
WBC #/AREA URNS AUTO: 4 /HPF (ref 0–5)

## 2023-03-26 PROCEDURE — 93005 ELECTROCARDIOGRAM TRACING: CPT | Mod: ER

## 2023-03-26 PROCEDURE — 84484 ASSAY OF TROPONIN QUANT: CPT | Mod: ER | Performed by: EMERGENCY MEDICINE

## 2023-03-26 PROCEDURE — 86803 HEPATITIS C AB TEST: CPT | Performed by: EMERGENCY MEDICINE

## 2023-03-26 PROCEDURE — 25000003 PHARM REV CODE 250: Mod: ER | Performed by: EMERGENCY MEDICINE

## 2023-03-26 PROCEDURE — 96374 THER/PROPH/DIAG INJ IV PUSH: CPT | Mod: ER

## 2023-03-26 PROCEDURE — 99285 EMERGENCY DEPT VISIT HI MDM: CPT | Mod: 25,ER

## 2023-03-26 PROCEDURE — 93010 ELECTROCARDIOGRAM REPORT: CPT | Mod: ,,, | Performed by: INTERNAL MEDICINE

## 2023-03-26 PROCEDURE — 83735 ASSAY OF MAGNESIUM: CPT | Mod: ER | Performed by: EMERGENCY MEDICINE

## 2023-03-26 PROCEDURE — 83880 ASSAY OF NATRIURETIC PEPTIDE: CPT | Mod: ER | Performed by: EMERGENCY MEDICINE

## 2023-03-26 PROCEDURE — 80053 COMPREHEN METABOLIC PANEL: CPT | Mod: ER | Performed by: EMERGENCY MEDICINE

## 2023-03-26 PROCEDURE — 87389 HIV-1 AG W/HIV-1&-2 AB AG IA: CPT | Performed by: EMERGENCY MEDICINE

## 2023-03-26 PROCEDURE — 85025 COMPLETE CBC W/AUTO DIFF WBC: CPT | Mod: ER | Performed by: EMERGENCY MEDICINE

## 2023-03-26 PROCEDURE — 81000 URINALYSIS NONAUTO W/SCOPE: CPT | Mod: ER | Performed by: EMERGENCY MEDICINE

## 2023-03-26 PROCEDURE — 63600175 PHARM REV CODE 636 W HCPCS: Mod: ER | Performed by: EMERGENCY MEDICINE

## 2023-03-26 PROCEDURE — 93010 EKG 12-LEAD: ICD-10-PCS | Mod: ,,, | Performed by: INTERNAL MEDICINE

## 2023-03-26 RX ORDER — MECLIZINE HYDROCHLORIDE 25 MG/1
25 TABLET ORAL 3 TIMES DAILY PRN
Qty: 20 TABLET | Refills: 0 | OUTPATIENT
Start: 2023-03-26 | End: 2023-05-17

## 2023-03-26 RX ORDER — DIAZEPAM 10 MG/2ML
2 INJECTION INTRAMUSCULAR
Status: COMPLETED | OUTPATIENT
Start: 2023-03-26 | End: 2023-03-26

## 2023-03-26 RX ORDER — MECLIZINE HYDROCHLORIDE 25 MG/1
50 TABLET ORAL
Status: COMPLETED | OUTPATIENT
Start: 2023-03-26 | End: 2023-03-26

## 2023-03-26 RX ORDER — PREDNISONE 10 MG/1
40 TABLET ORAL DAILY
Qty: 20 TABLET | Refills: 0 | Status: SHIPPED | OUTPATIENT
Start: 2023-03-26 | End: 2023-03-31

## 2023-03-26 RX ADMIN — MECLIZINE HYDROCHLORIDE 50 MG: 25 TABLET ORAL at 08:03

## 2023-03-26 RX ADMIN — DIAZEPAM 2 MG: 10 INJECTION, SOLUTION INTRAMUSCULAR; INTRAVENOUS at 09:03

## 2023-03-27 LAB
HCV AB SERPL QL IA: NEGATIVE
HEP C VIRUS HOLD SPECIMEN: NORMAL
HIV 1+2 AB+HIV1 P24 AG SERPL QL IA: NEGATIVE
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
## 2023-05-17 ENCOUNTER — HOSPITAL ENCOUNTER (EMERGENCY)
Facility: HOSPITAL | Age: 53
Discharge: HOME | End: 2023-05-17
Attending: EMERGENCY MEDICINE
Payer: COMMERCIAL

## 2023-05-17 VITALS
DIASTOLIC BLOOD PRESSURE: 57 MMHG | HEART RATE: 59 BPM | TEMPERATURE: 98 F | WEIGHT: 185 LBS | BODY MASS INDEX: 29.86 KG/M2 | OXYGEN SATURATION: 99 % | SYSTOLIC BLOOD PRESSURE: 123 MMHG | RESPIRATION RATE: 16 BRPM

## 2023-05-17 DIAGNOSIS — H93.12 TINNITUS OF LEFT EAR: ICD-10-CM

## 2023-05-17 DIAGNOSIS — R79.89 ELEVATED TROPONIN: Primary | ICD-10-CM

## 2023-05-17 DIAGNOSIS — R42 DIZZINESS: ICD-10-CM

## 2023-05-17 LAB
ALBUMIN SERPL BCP-MCNC: 3.5 G/DL (ref 3.5–5.2)
ALP SERPL-CCNC: 69 U/L (ref 55–135)
ALT SERPL W/O P-5'-P-CCNC: 11 U/L (ref 10–44)
ANION GAP SERPL CALC-SCNC: 8 MMOL/L (ref 8–16)
AST SERPL-CCNC: 13 U/L (ref 10–40)
BASOPHILS # BLD AUTO: 0.01 K/UL (ref 0–0.2)
BASOPHILS NFR BLD: 0.3 % (ref 0–1.9)
BILIRUB SERPL-MCNC: 0.4 MG/DL (ref 0.1–1)
BNP SERPL-MCNC: 73 PG/ML (ref 0–99)
BUN SERPL-MCNC: 11 MG/DL (ref 6–20)
CALCIUM SERPL-MCNC: 9.2 MG/DL (ref 8.7–10.5)
CHLORIDE SERPL-SCNC: 109 MMOL/L (ref 95–110)
CO2 SERPL-SCNC: 26 MMOL/L (ref 23–29)
CREAT SERPL-MCNC: 0.8 MG/DL (ref 0.5–1.4)
DIFFERENTIAL METHOD: ABNORMAL
EOSINOPHIL # BLD AUTO: 0.1 K/UL (ref 0–0.5)
EOSINOPHIL NFR BLD: 2.1 % (ref 0–8)
ERYTHROCYTE [DISTWIDTH] IN BLOOD BY AUTOMATED COUNT: 14.8 % (ref 11.5–14.5)
EST. GFR  (NO RACE VARIABLE): >60 ML/MIN/1.73 M^2
GLUCOSE SERPL-MCNC: 94 MG/DL (ref 70–110)
HCT VFR BLD AUTO: 36 % (ref 37–48.5)
HGB BLD-MCNC: 12 G/DL (ref 12–16)
IMM GRANULOCYTES # BLD AUTO: 0.01 K/UL (ref 0–0.04)
IMM GRANULOCYTES NFR BLD AUTO: 0.3 % (ref 0–0.5)
LYMPHOCYTES # BLD AUTO: 1.4 K/UL (ref 1–4.8)
LYMPHOCYTES NFR BLD: 37.2 % (ref 18–48)
MCH RBC QN AUTO: 29.3 PG (ref 27–31)
MCHC RBC AUTO-ENTMCNC: 33.3 G/DL (ref 32–36)
MCV RBC AUTO: 88 FL (ref 82–98)
MONOCYTES # BLD AUTO: 0.4 K/UL (ref 0.3–1)
MONOCYTES NFR BLD: 11.2 % (ref 4–15)
NEUTROPHILS # BLD AUTO: 1.9 K/UL (ref 1.8–7.7)
NEUTROPHILS NFR BLD: 48.9 % (ref 38–73)
NRBC BLD-RTO: 0 /100 WBC
PLATELET # BLD AUTO: 180 K/UL (ref 150–450)
PMV BLD AUTO: 11.2 FL (ref 9.2–12.9)
POTASSIUM SERPL-SCNC: 3.9 MMOL/L (ref 3.5–5.1)
PROT SERPL-MCNC: 6.3 G/DL (ref 6–8.4)
RBC # BLD AUTO: 4.1 M/UL (ref 4–5.4)
SODIUM SERPL-SCNC: 143 MMOL/L (ref 136–145)
TROPONIN I SERPL DL<=0.01 NG/ML-MCNC: 0.03 NG/ML (ref 0–0.03)
TROPONIN I SERPL DL<=0.01 NG/ML-MCNC: 0.03 NG/ML (ref 0–0.03)
WBC # BLD AUTO: 3.84 K/UL (ref 3.9–12.7)

## 2023-05-17 PROCEDURE — 25000003 PHARM REV CODE 250: Mod: ER | Performed by: EMERGENCY MEDICINE

## 2023-05-17 PROCEDURE — 94761 N-INVAS EAR/PLS OXIMETRY MLT: CPT | Mod: ER

## 2023-05-17 PROCEDURE — 84484 ASSAY OF TROPONIN QUANT: CPT | Mod: ER | Performed by: EMERGENCY MEDICINE

## 2023-05-17 PROCEDURE — 93010 EKG 12-LEAD: ICD-10-PCS | Mod: ,,, | Performed by: STUDENT IN AN ORGANIZED HEALTH CARE EDUCATION/TRAINING PROGRAM

## 2023-05-17 PROCEDURE — 96374 THER/PROPH/DIAG INJ IV PUSH: CPT | Mod: ER

## 2023-05-17 PROCEDURE — 63600175 PHARM REV CODE 636 W HCPCS: Mod: ER | Performed by: EMERGENCY MEDICINE

## 2023-05-17 PROCEDURE — 80053 COMPREHEN METABOLIC PANEL: CPT | Mod: ER | Performed by: EMERGENCY MEDICINE

## 2023-05-17 PROCEDURE — 85025 COMPLETE CBC W/AUTO DIFF WBC: CPT | Mod: ER | Performed by: EMERGENCY MEDICINE

## 2023-05-17 PROCEDURE — 93005 ELECTROCARDIOGRAM TRACING: CPT | Mod: ER

## 2023-05-17 PROCEDURE — 99285 EMERGENCY DEPT VISIT HI MDM: CPT | Mod: 25,ER

## 2023-05-17 PROCEDURE — 83880 ASSAY OF NATRIURETIC PEPTIDE: CPT | Mod: ER | Performed by: EMERGENCY MEDICINE

## 2023-05-17 PROCEDURE — 93010 ELECTROCARDIOGRAM REPORT: CPT | Mod: ,,, | Performed by: STUDENT IN AN ORGANIZED HEALTH CARE EDUCATION/TRAINING PROGRAM

## 2023-05-17 PROCEDURE — 96361 HYDRATE IV INFUSION ADD-ON: CPT | Mod: ER

## 2023-05-17 RX ORDER — PANTOPRAZOLE SODIUM 40 MG/1
40 TABLET, DELAYED RELEASE ORAL
COMMUNITY

## 2023-05-17 RX ORDER — ATORVASTATIN CALCIUM 20 MG/1
20 TABLET, FILM COATED ORAL
COMMUNITY
Start: 2023-05-09

## 2023-05-17 RX ORDER — MECLIZINE HYDROCHLORIDE 25 MG/1
25 TABLET ORAL EVERY 6 HOURS PRN
Qty: 28 TABLET | Refills: 1 | Status: SHIPPED | OUTPATIENT
Start: 2023-05-17

## 2023-05-17 RX ORDER — FERROUS SULFATE, DRIED 160(50) MG
TABLET, EXTENDED RELEASE ORAL
COMMUNITY

## 2023-05-17 RX ORDER — ONDANSETRON 2 MG/ML
4 INJECTION INTRAMUSCULAR; INTRAVENOUS ONCE
Status: COMPLETED | OUTPATIENT
Start: 2023-05-17 | End: 2023-05-17

## 2023-05-17 RX ORDER — MECLIZINE HYDROCHLORIDE 25 MG/1
25 TABLET ORAL
Status: COMPLETED | OUTPATIENT
Start: 2023-05-17 | End: 2023-05-17

## 2023-05-17 RX ADMIN — ONDANSETRON 4 MG: 2 INJECTION INTRAMUSCULAR; INTRAVENOUS at 07:05

## 2023-05-17 RX ADMIN — MECLIZINE HYDROCHLORIDE 25 MG: 25 TABLET ORAL at 07:05

## 2023-05-17 RX ADMIN — SODIUM CHLORIDE 1000 ML: 9 INJECTION, SOLUTION INTRAVENOUS at 07:05

## 2023-05-17 NOTE — Clinical Note
Dontrell Quevedo accompanied their mother to the emergency department on 5/17/2023. They may return to work on 05/17/2023.      If you have any questions or concerns, please don't hesitate to call.       RN

## 2023-05-17 NOTE — ED PROVIDER NOTES
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 means of: Automated exposure control; adjustment of the mA and/or kV according to patient size (this includes techniques or standardized protocols for targeted exams where dose is matched to indication/reason for exam; i.e. extremities or head); and/or use of iterative reconstructive technique.        Electronically signed by: Carmine Alexandre  Date:                                            03/26/2023  Time:                                           20:34        Exam Ended: 03/26/23 20:28 Last Resulted: 03/26/23 20:34  Labs: ordered. Decision-making details documented in ED Course.     Details: Troponin 0.033, 3 hour repeat 0.031  Radiology: ordered and independent interpretation performed. Decision-making details documented in ED Course.  ECG/medicine tests: ordered and independent interpretation performed. Decision-making details documented in ED Course.  Discussion of management or test interpretation with external provider(s): Case was discussed with Dr. DwyerCoihwa-gq-rdym cardiology for [facility].  Case was also discussed with patient's treating cardiologist Dr.G. Bobby.           Discussed case with:Cardiology    Additional MDM:     NIH Stroke Scale:   Interval = baseline (upon arrival/admit)  Level of consciousness = 0 - alert  LOC questions = 0 - answers both correctly  LOC commands = 0 - performs both correctly  Best gaze = 0 - normal  Visual = 0 - no visual  "loss  Facial palsy = 0 - normal  Motor left arm =  0 - no drift  Motor right arm =  0 - no drift  Motor left leg = 0 - no drift  Motor right leg =  0 - no drift  Limb ataxia = 0 - absent  Sensory = 0 - normal  Best language = 0 - no aphasia  Dysarthria = 0 - normal articulation  Extinction and inattention = 0 - no neglect  NIH Stroke Scale Total = 0       MIPS Measures     Smoker? No     Hypertension: History of Hypertension: The patient has elevated blood pressure (higher than 120/80) while being treated in the ED but has a history of hypertension.      Portions of this note may have been created with voice recognition software. Occasional "wrong-word" or "sound-a-like" substitutions may have occurred due to the inherent limitations of voice recognition software. Please, read the note carefully and recognize, using context, where substitutions have occurred.            Clinical Impression       ICD-10-CM ICD-9-CM   1. Elevated troponin  R77.8 790.6   2. Dizziness  R42 780.4   3. Tinnitus of left ear  H93.12 388.30         ED Disposition       Disposition: Discharge to home  Patient condition: Good    Medication List     Medication List        CHANGE how you take these medications      meclizine 25 mg tablet  Commonly known as: ANTIVERT  Take 1 tablet (25 mg total) by mouth every 6 (six) hours as needed.  What changed:   when to take this  reasons to take this            ASK your doctor about these medications      * aspirin 81 MG EC tablet  Commonly known as: ECOTRIN     * aspirin 81 mg Cap     atorvastatin 20 MG tablet  Commonly known as: LIPITOR     bumetanide 2 MG tablet  Commonly known as: BUMEX     busPIRone 15 MG tablet  Commonly known as: BUSPAR     calcium citrate 200 mg (950 mg) tablet  Commonly known as: CALCITRATE     calcium-vitamin D3 500 mg-5 mcg (200 unit) per tablet  Commonly known as: OS-JOHNATHON 500 + D3     citalopram 20 MG tablet  Commonly known as: CeleXA     clopidogreL 75 mg tablet  Commonly known " as: PLAVIX     cyanocobalamin 1,000 mcg/mL injection     cyclobenzaprine 10 MG tablet  Commonly known as: FLEXERIL     diclofenac sodium 3 % gel  Commonly known as: SOLARAZE     estradioL 0.01 % (0.1 mg/gram) vaginal cream  Commonly known as: ESTRACE     etodolac 400 MG tablet  Commonly known as: LODINE     fluconazole 150 MG Tab  Commonly known as: DIFLUCAN     gabapentin 300 MG capsule  Commonly known as: NEURONTIN     galcanezumab-gnlm 120 mg/mL Pnij     ibuprofen 800 MG tablet  Commonly known as: ADVIL,MOTRIN     loratadine 10 mg tablet  Commonly known as: CLARITIN     losartan 50 MG tablet  Commonly known as: COZAAR     methocarbamoL 750 MG Tab  Commonly known as: ROBAXIN     metoprolol succinate 100 MG 24 hr tablet  Commonly known as: TOPROL-XL     modafiniL 200 MG Tab  Commonly known as: PROVIGIL     multivitamin capsule     nitroGLYCERIN 0.4 MG SL tablet  Commonly known as: NITROSTAT     omeprazole 40 MG capsule  Commonly known as: PRILOSEC     pantoprazole 40 MG tablet  Commonly known as: PROTONIX     potassium chloride SA 20 MEQ tablet  Commonly known as: K-DUR,KLOR-CON     rizatriptan 10 MG tablet  Commonly known as: MAXALT     solifenacin 5 MG tablet  Commonly known as: VESICARE     topiramate 200 MG Tab  Commonly known as: TOPAMAX     traZODone 100 MG tablet  Commonly known as: DESYREL     valsartan 80 MG tablet  Commonly known as: DIOVAN           * This list has 2 medication(s) that are the same as other medications prescribed for you. Read the directions carefully, and ask your doctor or other care provider to review them with you.                   Where to Get Your Medications        You can get these medications from any pharmacy    Bring a paper prescription for each of these medications  meclizine 25 mg tablet         ED Follow-up   Follow-up Information       Madhu Bobby MD In 2 days.    Specialty: Cardiology  Why: Return to emergency department for chest pain, chest pressure, numbness or  weakness to 1 side, slurred speech, difficulty talking, difficulty swallowing, or other concerns  Contact information:  [address]  [facility] [id number] [phone]                                      Taylor Sanches,   05/17/23 1421

## 2023-05-17 NOTE — Clinical Note
Dontrell Quevedo accompanied their mother to the emergency department on 5/17/2023. They may return to work on 05/17/2023.      If you have any questions or concerns, please don't hesitate to call.       DO

## 2023-05-17 NOTE — DISCHARGE INSTRUCTIONS
Do not take Antivert with your sleeping pill.  This could lead to over-sedation and accidental death

## 2023-05-22 ENCOUNTER — HOSPITAL ENCOUNTER (EMERGENCY)
Facility: HOSPITAL | Age: 53
Discharge: HOME | End: 2023-05-22
Attending: EMERGENCY MEDICINE
Payer: COMMERCIAL

## 2023-05-22 VITALS
TEMPERATURE: 98 F | HEIGHT: 66 IN | BODY MASS INDEX: 29.73 KG/M2 | RESPIRATION RATE: 18 BRPM | SYSTOLIC BLOOD PRESSURE: 166 MMHG | DIASTOLIC BLOOD PRESSURE: 88 MMHG | OXYGEN SATURATION: 98 % | WEIGHT: 185 LBS | HEART RATE: 70 BPM

## 2023-05-22 DIAGNOSIS — H65.02 ACUTE SEROUS OTITIS MEDIA OF LEFT EAR, RECURRENCE NOT SPECIFIED: Primary | ICD-10-CM

## 2023-05-22 DIAGNOSIS — R42 DIZZINESS: ICD-10-CM

## 2023-05-22 LAB
ALBUMIN SERPL BCP-MCNC: 4 G/DL (ref 3.5–5.2)
ALP SERPL-CCNC: 74 U/L (ref 55–135)
ALT SERPL W/O P-5'-P-CCNC: 12 U/L (ref 10–44)
ANION GAP SERPL CALC-SCNC: 13 MMOL/L (ref 8–16)
AST SERPL-CCNC: 17 U/L (ref 10–40)
BASOPHILS # BLD AUTO: 0.02 K/UL (ref 0–0.2)
BASOPHILS NFR BLD: 0.4 % (ref 0–1.9)
BILIRUB SERPL-MCNC: 0.5 MG/DL (ref 0.1–1)
BNP SERPL-MCNC: 72 PG/ML (ref 0–99)
BUN SERPL-MCNC: 11 MG/DL (ref 6–20)
CALCIUM SERPL-MCNC: 10 MG/DL (ref 8.7–10.5)
CHLORIDE SERPL-SCNC: 104 MMOL/L (ref 95–110)
CO2 SERPL-SCNC: 24 MMOL/L (ref 23–29)
CREAT SERPL-MCNC: 0.9 MG/DL (ref 0.5–1.4)
DIFFERENTIAL METHOD: NORMAL
EOSINOPHIL # BLD AUTO: 0.1 K/UL (ref 0–0.5)
EOSINOPHIL NFR BLD: 0.9 % (ref 0–8)
ERYTHROCYTE [DISTWIDTH] IN BLOOD BY AUTOMATED COUNT: 14.5 % (ref 11.5–14.5)
EST. GFR  (NO RACE VARIABLE): >60 ML/MIN/1.73 M^2
GLUCOSE SERPL-MCNC: 108 MG/DL (ref 70–110)
HCT VFR BLD AUTO: 44.2 % (ref 37–48.5)
HGB BLD-MCNC: 14.9 G/DL (ref 12–16)
IMM GRANULOCYTES # BLD AUTO: 0.02 K/UL (ref 0–0.04)
IMM GRANULOCYTES NFR BLD AUTO: 0.4 % (ref 0–0.5)
LYMPHOCYTES # BLD AUTO: 1.7 K/UL (ref 1–4.8)
LYMPHOCYTES NFR BLD: 31.9 % (ref 18–48)
MCH RBC QN AUTO: 28.8 PG (ref 27–31)
MCHC RBC AUTO-ENTMCNC: 33.7 G/DL (ref 32–36)
MCV RBC AUTO: 85 FL (ref 82–98)
MONOCYTES # BLD AUTO: 0.4 K/UL (ref 0.3–1)
MONOCYTES NFR BLD: 8.2 % (ref 4–15)
NEUTROPHILS # BLD AUTO: 3.1 K/UL (ref 1.8–7.7)
NEUTROPHILS NFR BLD: 58.2 % (ref 38–73)
NRBC BLD-RTO: 0 /100 WBC
PLATELET # BLD AUTO: 234 K/UL (ref 150–450)
PMV BLD AUTO: 10.8 FL (ref 9.2–12.9)
POTASSIUM SERPL-SCNC: 3.8 MMOL/L (ref 3.5–5.1)
PROT SERPL-MCNC: 7.6 G/DL (ref 6–8.4)
RBC # BLD AUTO: 5.18 M/UL (ref 4–5.4)
SODIUM SERPL-SCNC: 141 MMOL/L (ref 136–145)
TROPONIN I SERPL DL<=0.01 NG/ML-MCNC: 0.03 NG/ML (ref 0–0.03)
WBC # BLD AUTO: 5.27 K/UL (ref 3.9–12.7)

## 2023-05-22 PROCEDURE — 99285 EMERGENCY DEPT VISIT HI MDM: CPT | Mod: 25,ER

## 2023-05-22 PROCEDURE — 93010 EKG 12-LEAD: ICD-10-PCS | Mod: ,,, | Performed by: INTERNAL MEDICINE

## 2023-05-22 PROCEDURE — 83880 ASSAY OF NATRIURETIC PEPTIDE: CPT | Mod: ER | Performed by: EMERGENCY MEDICINE

## 2023-05-22 PROCEDURE — 93005 ELECTROCARDIOGRAM TRACING: CPT | Mod: ER

## 2023-05-22 PROCEDURE — 63600175 PHARM REV CODE 636 W HCPCS: Mod: ER | Performed by: EMERGENCY MEDICINE

## 2023-05-22 PROCEDURE — 84484 ASSAY OF TROPONIN QUANT: CPT | Mod: ER | Performed by: EMERGENCY MEDICINE

## 2023-05-22 PROCEDURE — 96361 HYDRATE IV INFUSION ADD-ON: CPT | Mod: ER

## 2023-05-22 PROCEDURE — 25000003 PHARM REV CODE 250: Mod: ER | Performed by: EMERGENCY MEDICINE

## 2023-05-22 PROCEDURE — 96372 THER/PROPH/DIAG INJ SC/IM: CPT | Performed by: EMERGENCY MEDICINE

## 2023-05-22 PROCEDURE — 93010 ELECTROCARDIOGRAM REPORT: CPT | Mod: ,,, | Performed by: INTERNAL MEDICINE

## 2023-05-22 PROCEDURE — 96374 THER/PROPH/DIAG INJ IV PUSH: CPT | Mod: ER

## 2023-05-22 PROCEDURE — 85025 COMPLETE CBC W/AUTO DIFF WBC: CPT | Mod: ER | Performed by: EMERGENCY MEDICINE

## 2023-05-22 PROCEDURE — 80053 COMPREHEN METABOLIC PANEL: CPT | Mod: ER | Performed by: EMERGENCY MEDICINE

## 2023-05-22 RX ORDER — AMOXICILLIN AND CLAVULANATE POTASSIUM 875; 125 MG/1; MG/1
1 TABLET, FILM COATED ORAL 2 TIMES DAILY
Qty: 20 TABLET | Refills: 0 | Status: SHIPPED | OUTPATIENT
Start: 2023-05-22 | End: 2023-06-01

## 2023-05-22 RX ORDER — AMOXICILLIN AND CLAVULANATE POTASSIUM 875; 125 MG/1; MG/1
1 TABLET, FILM COATED ORAL
Status: COMPLETED | OUTPATIENT
Start: 2023-05-22 | End: 2023-05-22

## 2023-05-22 RX ORDER — DEXAMETHASONE SODIUM PHOSPHATE 4 MG/ML
4 INJECTION, SOLUTION INTRA-ARTICULAR; INTRALESIONAL; INTRAMUSCULAR; INTRAVENOUS; SOFT TISSUE
Status: COMPLETED | OUTPATIENT
Start: 2023-05-22 | End: 2023-05-22

## 2023-05-22 RX ORDER — ONDANSETRON 2 MG/ML
4 INJECTION INTRAMUSCULAR; INTRAVENOUS
Status: COMPLETED | OUTPATIENT
Start: 2023-05-22 | End: 2023-05-22

## 2023-05-22 RX ADMIN — SODIUM CHLORIDE, POTASSIUM CHLORIDE, SODIUM LACTATE AND CALCIUM CHLORIDE 500 ML: 600; 310; 30; 20 INJECTION, SOLUTION INTRAVENOUS at 07:05

## 2023-05-22 RX ADMIN — DEXAMETHASONE SODIUM PHOSPHATE 4 MG: 4 INJECTION INTRA-ARTICULAR; INTRALESIONAL; INTRAMUSCULAR; INTRAVENOUS; SOFT TISSUE at 08:05

## 2023-05-22 RX ADMIN — AMOXICILLIN AND CLAVULANATE POTASSIUM 1 TABLET: 875; 125 TABLET, FILM COATED ORAL at 08:05

## 2023-05-22 RX ADMIN — ONDANSETRON 4 MG: 2 INJECTION INTRAMUSCULAR; INTRAVENOUS at 07:05

## 2023-05-22 NOTE — ED PROVIDER NOTES
Encounter Date: 5/22/2023       History     Chief Complaint   Patient presents with    Dizziness     Dizziness and nausea, dx with vertigo Thursday      The history is provided by the patient.   Dizziness  This is a recurrent problem. The current episode started more than 2 days ago. The problem occurs daily. The problem has not changed since onset.Pertinent negatives include no chest pain, no abdominal pain, no headaches and no shortness of breath. The symptoms are aggravated by walking, bending and exertion. The symptoms are relieved by lying down and rest. Pt dx c Vertigo 5/17.    Chart Reviewed    Review of patient's allergies indicates:   Allergen Reactions    Ace inhibitors Swelling    Hydrochlorothiazide     Sulfa (sulfonamide antibiotics) Hives    Sulfur soap      Past Medical History:   Diagnosis Date    Anxiety     CHF (congestive heart failure)     Coronary artery disease     GERD (gastroesophageal reflux disease)     Hypertension     Miocardial Infarction     2015     Past Surgical History:   Procedure Laterality Date    Aortic Dissection Repair      2015    BLADDER REPAIR      CARDIAC CATHETERIZATION      2015    CHOLECYSTECTOMY      CORONARY ANGIOPLASTY WITH STENT PLACEMENT      2015    HYSTERECTOMY      STOMACH SURGERY      Gastric sleeve     No family history on file.  Social History     Tobacco Use    Smoking status: Never    Smokeless tobacco: Never   Substance Use Topics    Alcohol use: Yes     Comment: daily, wine    Drug use: No     Review of Systems   Constitutional:  Negative for chills and fever.   HENT:  Positive for ear pain. Negative for congestion, dental problem and sore throat.    Eyes:  Negative for photophobia and visual disturbance.   Respiratory:  Negative for chest tightness and shortness of breath.    Cardiovascular:  Negative for chest pain, palpitations and leg swelling.   Gastrointestinal:  Negative for abdominal pain.   Genitourinary:  Negative for dysuria.   Musculoskeletal:   Negative for back pain.   Neurological:  Positive for dizziness. Negative for tremors, seizures, syncope, facial asymmetry, speech difficulty, weakness, light-headedness, numbness and headaches.   Hematological:  Negative for adenopathy. Does not bruise/bleed easily.     Physical Exam     Initial Vitals [05/22/23 1807]   BP Pulse Resp Temp SpO2   (!) 138/100 79 18 98 °F (36.7 °C) 98 %      MAP       --         Physical Exam    Nursing note and vitals reviewed.  Constitutional: She appears well-developed and well-nourished. No distress.   HENT:   Head: Normocephalic and atraumatic.   Right Ear: Tympanic membrane and ear canal normal.   Left Ear: Ear canal normal. Tympanic membrane is erythematous.   Mouth/Throat: Oropharynx is clear and moist.   Eyes: Conjunctivae and EOM are normal. Pupils are equal, round, and reactive to light.   Neck: Neck supple.   Normal range of motion.  Cardiovascular:  Normal rate, regular rhythm and normal heart sounds.           Pulmonary/Chest: Breath sounds normal. No respiratory distress.   Abdominal: Abdomen is soft. Bowel sounds are normal. She exhibits no distension. There is no abdominal tenderness.   Musculoskeletal:         General: Normal range of motion.      Cervical back: Normal range of motion and neck supple.     Neurological: She is alert and oriented to person, place, and time. She has normal strength.   Skin: Skin is warm and dry.   Psychiatric: She has a normal mood and affect. Thought content normal.       ED Course   Procedures  Labs Reviewed   TROPONIN I - Abnormal; Notable for the following components:       Result Value    Troponin I 0.029 (*)     All other components within normal limits   CBC W/ AUTO DIFFERENTIAL   COMPREHENSIVE METABOLIC PANEL   B-TYPE NATRIURETIC PEPTIDE     Results for orders placed or performed during the hospital encounter of 05/22/23   CBC Auto Differential   Result Value Ref Range    WBC 5.27 3.90 - 12.70 K/uL    RBC 5.18 4.00 - 5.40 M/uL     Hemoglobin 14.9 12.0 - 16.0 g/dL    Hematocrit 44.2 37.0 - 48.5 %    MCV 85 82 - 98 fL    MCH 28.8 27.0 - 31.0 pg    MCHC 33.7 32.0 - 36.0 g/dL    RDW 14.5 11.5 - 14.5 %    Platelets 234 150 - 450 K/uL    MPV 10.8 9.2 - 12.9 fL    Immature Granulocytes 0.4 0.0 - 0.5 %    Gran # (ANC) 3.1 1.8 - 7.7 K/uL    Immature Grans (Abs) 0.02 0.00 - 0.04 K/uL    Lymph # 1.7 1.0 - 4.8 K/uL    Mono # 0.4 0.3 - 1.0 K/uL    Eos # 0.1 0.0 - 0.5 K/uL    Baso # 0.02 0.00 - 0.20 K/uL    nRBC 0 0 /100 WBC    Gran % 58.2 38.0 - 73.0 %    Lymph % 31.9 18.0 - 48.0 %    Mono % 8.2 4.0 - 15.0 %    Eosinophil % 0.9 0.0 - 8.0 %    Basophil % 0.4 0.0 - 1.9 %    Differential Method Automated    Comprehensive Metabolic Panel   Result Value Ref Range    Sodium 141 136 - 145 mmol/L    Potassium 3.8 3.5 - 5.1 mmol/L    Chloride 104 95 - 110 mmol/L    CO2 24 23 - 29 mmol/L    Glucose 108 70 - 110 mg/dL    BUN 11 6 - 20 mg/dL    Creatinine 0.9 0.5 - 1.4 mg/dL    Calcium 10.0 8.7 - 10.5 mg/dL    Total Protein 7.6 6.0 - 8.4 g/dL    Albumin 4.0 3.5 - 5.2 g/dL    Total Bilirubin 0.5 0.1 - 1.0 mg/dL    Alkaline Phosphatase 74 55 - 135 U/L    AST 17 10 - 40 U/L    ALT 12 10 - 44 U/L    Anion Gap 13 8 - 16 mmol/L    eGFR >60.0 >60 mL/min/1.73 m^2   BNP   Result Value Ref Range    BNP 72 0 - 99 pg/mL   Troponin I   Result Value Ref Range    Troponin I 0.029 (H) 0.000 - 0.026 ng/mL       EKG Readings: (Independently Interpreted)   Initial Reading: No STEMI. Rhythm: Normal Sinus Rhythm. Heart Rate: 80.     Imaging Results              X-Ray Chest 1 View (Final result)  Result time 05/22/23 19:01:38      Final result by Carmine Alexandre MD (05/22/23 19:01:38)                   Impression:      As above      Electronically signed by: Carmine Alexandre  Date:    05/22/2023  Time:    19:01               Narrative:    EXAMINATION:  XR CHEST 1 VIEW    CLINICAL HISTORY:  Dizziness and giddiness    TECHNIQUE:  Single frontal view of the chest was  performed.    COMPARISON:  None    FINDINGS:  Cardiomegaly with mild perihilar edema suggestive of element of edema and CHF.    Bones are intact.                                       Medications   ondansetron injection 4 mg (4 mg Intravenous Given 5/22/23 1914)   lactated ringers bolus 500 mL (500 mLs Intravenous New Bag 5/22/23 1914)     Medical Decision Making:   History:   Old Medical Records: I decided to obtain old medical records.  Old Records Summarized: records from clinic visits.       <> Summary of Records: 5/17/23  No significant MRI abnormality of the brain  No significant abnormality on MRA of the neck  No significant abnormality on MRA of the brain  Trop 0.033 0.031  Initial Assessment:   53 yo dizziness, dx c vertigo reports ear pain left  Differential Diagnosis:   Otitis media, Vertigo, CVA,ACS  Clinical Tests:   Lab Tests: Ordered and Reviewed  The following lab test(s) were unremarkable: CBC, CMP and Troponin  Radiological Study: Ordered and Reviewed  Medical Tests: Ordered and Reviewed  ED Management:  Steroid injection and ABx for OM. Close follow up with Cardiologist Dr Bobby as discussed.  Other:   I have discussed this case with another health care provider.       <> Summary of the Discussion: Cardiology Consult- troponin and EKG nonspecific, follow up with Cardiology outpt                        Clinical Impression:   Final diagnoses:  [R42] Dizziness  [H65.02] Acute serous otitis media of left ear, recurrence not specified (Primary)        ED Disposition Condition    Discharge Stable          ED Prescriptions       Medication Sig Dispense Start Date End Date Auth. Provider    amoxicillin-clavulanate 875-125mg (AUGMENTIN) 875-125 mg per tablet Take 1 tablet by mouth 2 (two) times daily. for 10 days 20 tablet 5/22/2023 6/1/2023 Andrew Funes MD          Follow-up Information       Follow up With Specialties Details Why Contact Info    Rigo Sultana MD Family Medicine Call in  2 days  [address] [facility]  [place] [address]  [phone]      ENT   as scheduled     Cardiology  Call in 3 days elevated troponin              Andrew Funes MD  05/22/23 2043

## 2023-05-23 RX ORDER — ONDANSETRON 4 MG/1
4 TABLET, ORALLY DISINTEGRATING ORAL EVERY 6 HOURS PRN
Qty: 15 TABLET | Refills: 0 | Status: SHIPPED | OUTPATIENT
Start: 2023-05-23

## 2023-05-23 RX ORDER — FLUCONAZOLE 150 MG/1
150 TABLET ORAL DAILY
Qty: 1 TABLET | Refills: 0 | Status: SHIPPED | OUTPATIENT
Start: 2023-05-23 | End: 2023-05-24

## 2023-05-25 ENCOUNTER — CLINICAL SUPPORT (OUTPATIENT)
Dept: AUDIOLOGY | Facility: CLINIC | Age: 53
End: 2023-05-25
Payer: COMMERCIAL

## 2023-05-25 ENCOUNTER — OFFICE VISIT (OUTPATIENT)
Dept: OTOLARYNGOLOGY | Facility: CLINIC | Age: 53
End: 2023-05-25
Payer: COMMERCIAL

## 2023-05-25 VITALS
HEIGHT: 66 IN | SYSTOLIC BLOOD PRESSURE: 157 MMHG | HEART RATE: 65 BPM | WEIGHT: 184.94 LBS | DIASTOLIC BLOOD PRESSURE: 101 MMHG | TEMPERATURE: 99 F | BODY MASS INDEX: 29.72 KG/M2

## 2023-05-25 DIAGNOSIS — H93.12 TINNITUS OF LEFT EAR: Primary | ICD-10-CM

## 2023-05-25 DIAGNOSIS — H93.12 TINNITUS, LEFT: Primary | ICD-10-CM

## 2023-05-25 DIAGNOSIS — H90.3 ASYMMETRIC SNHL (SENSORINEURAL HEARING LOSS): ICD-10-CM

## 2023-05-25 DIAGNOSIS — S06.0X0A: ICD-10-CM

## 2023-05-25 DIAGNOSIS — H90.3 SENSORY HEARING LOSS, BILATERAL: ICD-10-CM

## 2023-05-25 DIAGNOSIS — R42 DIZZINESS: ICD-10-CM

## 2023-05-25 DIAGNOSIS — G43.809 VESTIBULAR MIGRAINE: ICD-10-CM

## 2023-05-25 PROCEDURE — 99999 PR PBB SHADOW E&M-EST. PATIENT-LVL V: CPT | Mod: PBBFAC,,, | Performed by: STUDENT IN AN ORGANIZED HEALTH CARE EDUCATION/TRAINING PROGRAM

## 2023-05-25 PROCEDURE — 1159F PR MEDICATION LIST DOCUMENTED IN MEDICAL RECORD: ICD-10-PCS | Mod: CPTII,S$GLB,, | Performed by: STUDENT IN AN ORGANIZED HEALTH CARE EDUCATION/TRAINING PROGRAM

## 2023-05-25 PROCEDURE — 3080F PR MOST RECENT DIASTOLIC BLOOD PRESSURE >= 90 MM HG: ICD-10-PCS | Mod: CPTII,S$GLB,, | Performed by: STUDENT IN AN ORGANIZED HEALTH CARE EDUCATION/TRAINING PROGRAM

## 2023-05-25 PROCEDURE — 99999 PR PBB SHADOW E&M-EST. PATIENT-LVL I: ICD-10-PCS | Mod: PBBFAC,,, | Performed by: AUDIOLOGIST-HEARING AID FITTER

## 2023-05-25 PROCEDURE — 92557 COMPREHENSIVE HEARING TEST: CPT | Mod: S$GLB,,, | Performed by: AUDIOLOGIST-HEARING AID FITTER

## 2023-05-25 PROCEDURE — 4010F PR ACE/ARB THEARPY RXD/TAKEN: ICD-10-PCS | Mod: CPTII,S$GLB,, | Performed by: STUDENT IN AN ORGANIZED HEALTH CARE EDUCATION/TRAINING PROGRAM

## 2023-05-25 PROCEDURE — 99999 PR PBB SHADOW E&M-EST. PATIENT-LVL I: CPT | Mod: PBBFAC,,, | Performed by: AUDIOLOGIST-HEARING AID FITTER

## 2023-05-25 PROCEDURE — 92587 PR EVOKED AUDITORY TEST,LIMITED: ICD-10-PCS | Mod: S$GLB,,, | Performed by: AUDIOLOGIST-HEARING AID FITTER

## 2023-05-25 PROCEDURE — 3080F DIAST BP >= 90 MM HG: CPT | Mod: CPTII,S$GLB,, | Performed by: STUDENT IN AN ORGANIZED HEALTH CARE EDUCATION/TRAINING PROGRAM

## 2023-05-25 PROCEDURE — 99204 OFFICE O/P NEW MOD 45 MIN: CPT | Mod: S$GLB,,, | Performed by: STUDENT IN AN ORGANIZED HEALTH CARE EDUCATION/TRAINING PROGRAM

## 2023-05-25 PROCEDURE — 4010F ACE/ARB THERAPY RXD/TAKEN: CPT | Mod: CPTII,S$GLB,, | Performed by: STUDENT IN AN ORGANIZED HEALTH CARE EDUCATION/TRAINING PROGRAM

## 2023-05-25 PROCEDURE — 3008F PR BODY MASS INDEX (BMI) DOCUMENTED: ICD-10-PCS | Mod: CPTII,S$GLB,, | Performed by: STUDENT IN AN ORGANIZED HEALTH CARE EDUCATION/TRAINING PROGRAM

## 2023-05-25 PROCEDURE — 92567 PR TYMPA2METRY: ICD-10-PCS | Mod: S$GLB,,, | Performed by: AUDIOLOGIST-HEARING AID FITTER

## 2023-05-25 PROCEDURE — 3077F PR MOST RECENT SYSTOLIC BLOOD PRESSURE >= 140 MM HG: ICD-10-PCS | Mod: CPTII,S$GLB,, | Performed by: STUDENT IN AN ORGANIZED HEALTH CARE EDUCATION/TRAINING PROGRAM

## 2023-05-25 PROCEDURE — 3008F BODY MASS INDEX DOCD: CPT | Mod: CPTII,S$GLB,, | Performed by: STUDENT IN AN ORGANIZED HEALTH CARE EDUCATION/TRAINING PROGRAM

## 2023-05-25 PROCEDURE — 99999 PR PBB SHADOW E&M-EST. PATIENT-LVL V: ICD-10-PCS | Mod: PBBFAC,,, | Performed by: STUDENT IN AN ORGANIZED HEALTH CARE EDUCATION/TRAINING PROGRAM

## 2023-05-25 PROCEDURE — 1159F MED LIST DOCD IN RCRD: CPT | Mod: CPTII,S$GLB,, | Performed by: STUDENT IN AN ORGANIZED HEALTH CARE EDUCATION/TRAINING PROGRAM

## 2023-05-25 PROCEDURE — 92567 TYMPANOMETRY: CPT | Mod: S$GLB,,, | Performed by: AUDIOLOGIST-HEARING AID FITTER

## 2023-05-25 PROCEDURE — 3077F SYST BP >= 140 MM HG: CPT | Mod: CPTII,S$GLB,, | Performed by: STUDENT IN AN ORGANIZED HEALTH CARE EDUCATION/TRAINING PROGRAM

## 2023-05-25 PROCEDURE — 92557 PR COMPREHENSIVE HEARING TEST: ICD-10-PCS | Mod: S$GLB,,, | Performed by: AUDIOLOGIST-HEARING AID FITTER

## 2023-05-25 PROCEDURE — 99204 PR OFFICE/OUTPT VISIT, NEW, LEVL IV, 45-59 MIN: ICD-10-PCS | Mod: S$GLB,,, | Performed by: STUDENT IN AN ORGANIZED HEALTH CARE EDUCATION/TRAINING PROGRAM
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
## 2023-05-25 NOTE — PROGRESS NOTES
Kasie Quevedo was seen 05/25/2023 for an audiological evaluation.    Results reveal a normal-to-mild sensorineural hearing loss 250-8000 Hz for the right ear, and  profound sensorineural hearing loss 250-8000 Hz for the left ear.   Speech Reception Thresholds were  20 dBHL for the right ear and NR for the left ear.   Word recognition scores were excellent for the right ear and  CNT  for the left ear.   Tympanograms were Type A for the right ear and Type A for the left ear.    DPOAEs - present in the right ear; absent in the left ear.     Negative pamela.     Otoscopy was unremarkable bilaterally.     Negative DHP to the right and left.     Patient was counseled on the above findings.    Recommendations:  1. ENT review

## 2023-06-07 ENCOUNTER — CLINICAL SUPPORT (OUTPATIENT)
Dept: AUDIOLOGY | Facility: CLINIC | Age: 53
End: 2023-06-07
Payer: COMMERCIAL

## 2023-06-07 DIAGNOSIS — H81.92 PERIPHERAL VESTIBULOPATHY OF LEFT EAR: Primary | ICD-10-CM

## 2023-06-07 DIAGNOSIS — H90.3 SENSORY HEARING LOSS, BILATERAL: ICD-10-CM

## 2023-06-07 PROCEDURE — 92537 CALORIC VSTBLR TEST W/REC: CPT | Mod: S$GLB,,,

## 2023-06-07 PROCEDURE — 92540 BASIC VESTIBULAR EVALUATION: CPT | Mod: S$GLB,,,

## 2023-06-07 PROCEDURE — 92537 PR CALORIC VSTBLR TEST W/REC BITHERMAL: ICD-10-PCS | Mod: S$GLB,,,

## 2023-06-07 PROCEDURE — 92540 PR VESTIBULAR EVAL NYSTAG FOVL&PERPH STIM OSCIL TRACKING: ICD-10-PCS | Mod: S$GLB,,,

## 2023-06-07 NOTE — PROGRESS NOTES
Kasie Quevedo was seen 06/07/2023 for a vestibular evaluation. Patient complained of episodes of dizziness for the past 4-5 months ago. Dizziness was described as room-spinning and imbalance with associated nausea. Patient reported episodes seem more frequent now than previously. Patient reported that watching cars move and heat act as triggers. Symptoms are worse in the morning with associated headaches and blurry vision. Per report, patient was diagnosed with vertiginous migraines by a neurologist. She does experience light sensitivity. Patient reported laying down and closing her eyes can help alleviate symptoms. Audiological evaluation on 05/25/2023 revealed a normal-to-mild sensorineural hearing loss 250-8000 Hz for the right ear, and a profound sensorineural hearing loss 250-8000 Hz for the left ear.    Videonystagmography Report (VNG):  Oculomotor function tests (sinusoidal tracking, saccade, optokinetic) were normal and symmetric.  Spontaneous test revealed 5 d/s left-beating nystagmus that suppressed with fixation.  Gaze test was absent for nystagmus.  Head-shake test revealed 5 d/s right-beating nystagmus after head-shake.  Static Positional test revealed the following:   Supine: 7 d/s left-beating nystagmus that suppressed with fixation   Head Right: 6 d/s left-beating nystagmus that suppressed with fixation   Head Left: 8 ds left-beating nystagmus that suppressed to square wave jerks with fixation   Body Right: No nystagmus   Body Left: 7 d/s left-beating nystagmus that suppressed to square wave jerks with fixation  Lancaster-Hallpike Right was negative for BPPV; 7 d/s left-beating and 4 d/s down-beating nystagmus in supine and sitting that suppressed with fixation.  L[place]aster-Hallpike Left was suspicious for BPPV; 7 d/s left-beating and 6 d/s down-beating nystagmus in supine (inconsistent rotary component noted) and sitting that suppressed with fixation.  Bi-thermal caloric test was Abnormal - left ear  weakness.  Fixation suppression following caloric irrigations was within normal limits. The following values were obtained after correcting for 3 d/s left-beating nystagmus: unilateral weakness (UW): 67% left ear and directional preponderance (DP): 0%  RC: 12 d/s RW: 13 d/s   d/s LW: 3 d/s    Summary: Abnormal VNG consistent with left peripheral vestibular dysfunction. Hallpike testing was also suspicious for BPPV.      Patient was counseled on the above findings. Discussed amplification options (CROS hearing aid, BAHA, and cochlear implant) for the left ear with patient. Patient is interested in CROS technology and will contact her insurance regarding hearing aid benefit.     Recommendations:  ENT Review.     Tracings are to be scanned.

## 2023-06-13 DIAGNOSIS — H81.90 PERIPHERAL VESTIBULOPATHY, UNSPECIFIED LATERALITY: Primary | ICD-10-CM

## 2023-06-15 ENCOUNTER — CLINICAL SUPPORT (OUTPATIENT)
Dept: REHABILITATION | Facility: HOSPITAL | Age: 53
End: 2023-06-15
Attending: STUDENT IN AN ORGANIZED HEALTH CARE EDUCATION/TRAINING PROGRAM
Payer: COMMERCIAL

## 2023-06-15 DIAGNOSIS — R42 DIZZINESS: Primary | ICD-10-CM

## 2023-06-15 DIAGNOSIS — R26.9 ABNORMALITY OF GAIT AND MOBILITY: ICD-10-CM

## 2023-06-15 DIAGNOSIS — H81.90 PERIPHERAL VESTIBULOPATHY, UNSPECIFIED LATERALITY: ICD-10-CM

## 2023-06-15 PROCEDURE — 97162 PT EVAL MOD COMPLEX 30 MIN: CPT | Performed by: PHYSICAL THERAPIST

## 2023-06-15 PROCEDURE — 97112 NEUROMUSCULAR REEDUCATION: CPT | Performed by: PHYSICAL THERAPIST

## 2023-06-15 NOTE — PATIENT INSTRUCTIONS
Visual Smooth Pursuit: tracking thumb    Follow a slow moving target such as your thumbnail with your eyes. Keep your head and body still, and move your thumb side to side, following with your eyes, and keeping your head still. You can use your thumb as the target, or have someone else move a target back and forth for you.    Goal is to perform for 20-30 seconds: (however, start per tolerance)    1)moving the target horizontally    2) vertically    3) diagonally each way    If you feel dizziness, rest until symptoms resolve, and repeat. work up to tolerance      SACCADES    Purpose of Activity: This activity will help you to tolerate quick eye movements such as those needed for reading.     Sit in a comfortable position, hold a playing cared (jimmie or queen) in each hand at about eye level and at a comfortable distance.     Keep your head still, move your eyes quickly from one card to the other without stopping in between cards. Do not go so quickly that you blur the targets. Remember to only move your eyes and not move your head.    At first, use a larger target. As you improve, try to focus on progressively smaller details of the face card such as the nose, eye, or mouth of each card. As you improve, try to move your eyes more quickly.    Repetitions:     Repeat 30 to 40 times in the horizontal direction or time yourself for approximately 20-30 seconds.   Repeat 30 to 40 times in the vertical direction or time yourself for approximately 20-30 seconds.  Repeat 30 to 40 times in both diagonal directions (right/up to left/down; right/down to left/up) or time yourself for approximately 20-30 seconds.     Perform at least once per day. Start per your tolerance and work your way up to 20-30 seconds.                     VOR x 1 Horizontal:    Hold a card with a letter on it in front of you. Focus on the letter. Turn your head left and right, keeping your eyes focused on the letter. Attempt to turn your head  left/right as quickly as possible WHILE keeping the letter in focus. If the letter becomes blurry, slow down    Goal is to perform for 20-30 seconds: (however, start per tolerance)  Perform at least once per day        VOR x 1 Vertical:    Hold a card with a letter on it in front of you. Focus on the letter. Tilt your head up and down, keeping your eyes focused on the letter. Attempt to tilt your head up/down as quickly as possible WHILE keeping the letter in focus. If the letter becomes blurry, slow down    Goal is to perform for 20-30 seconds: (however, start per tolerance)  Perform at least once per day

## 2023-06-16 DIAGNOSIS — R47.9 DIFFICULTY WITH SPEECH: Primary | ICD-10-CM
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
## 2023-07-10 ENCOUNTER — CLINICAL SUPPORT (OUTPATIENT)
Dept: REHABILITATION | Facility: HOSPITAL | Age: 53
End: 2023-07-10
Attending: STUDENT IN AN ORGANIZED HEALTH CARE EDUCATION/TRAINING PROGRAM
Payer: COMMERCIAL

## 2023-07-10 DIAGNOSIS — R47.9 DIFFICULTY WITH SPEECH: ICD-10-CM

## 2023-07-10 DIAGNOSIS — R42 DIZZINESS: ICD-10-CM

## 2023-07-10 DIAGNOSIS — R41.841 COGNITIVE COMMUNICATION DISORDER: ICD-10-CM

## 2023-07-10 DIAGNOSIS — R26.9 ABNORMALITY OF GAIT AND MOBILITY: ICD-10-CM

## 2023-07-10 DIAGNOSIS — H81.90 PERIPHERAL VESTIBULOPATHY, UNSPECIFIED LATERALITY: Primary | ICD-10-CM

## 2023-07-10 PROCEDURE — 97112 NEUROMUSCULAR REEDUCATION: CPT | Performed by: PHYSICAL THERAPIST

## 2023-07-10 PROCEDURE — 96125 COGNITIVE TEST BY HC PRO: CPT | Performed by: SPEECH-LANGUAGE PATHOLOGIST

## 2023-07-10 NOTE — PROGRESS NOTES
See initial evaluation in plan of care.     Rhianna Perez MA, CCC-SLP  Speech Language Pathologist  7/13/2023

## 2023-07-10 NOTE — PROGRESS NOTES
"[facility]   Physical Therapy Treatment Note        Name: Kasie Quevedo  Clinic Number: [id number]    Therapy Diagnosis:   Encounter Diagnoses   Name Primary?    Peripheral vestibulopathy, unspecified laterality Yes    Dizziness     Abnormality of gait and mobility      Physician: Jose Ramon Roland MD    Visit Date: 7/10/2023    Physician Orders: PT Eval and Treat  Medical Diagnosis from Referral: peripheral vestibulopathy   Evaluation Date: 6/15/2023  Authorization Period Expiration: 6/12/2024  Plan of Care Expiration: 9/14/2023  Visit # / Visits authorized: 1/20 (+eval)  FOTO: 1/3     Progress Note Due on 7/16/2023     Precautions: Standard    PTA Visit #: 0/5     Time In: 1004  Time Out: 1044  Total Billable Time: 38 minutes (Billing reflects 1 on 1 treatment time spent with patient)    Subjective     Patient reports: that she feels about the same overall. She saw her neurologist on Thursday of last week. They want her to continue to work on vestibular PT, and they are putting off her next injection for now as she focuses on this PT treatment.     He/She was compliant with home exercise program.  Response to previous treatment: no adverse reactions  Functional change: pt does report slight improvements in turning her head since performing her exercises    Pain: 8/10     Location: headache, neck, shoulder, back, radicular L UE s/s    Objective      Objective Measures updated at progress report or POC update only unless otherwise noted.       Treatment     Kasie received the treatments listed below:     NEUROMUSCULAR RE-EDUCATION ACTIVITIES to improve Balance, Coordination, Kinesthetic, Sense, Proprioception, and Posture for (38) minutes.  The following were included:    Intervention Performed Today    Smooth Pursuits x 30" vertical and 30" horizontal   Difficulty tracking smoothly, moderate nystagmus noted, increases s/s mildly   Saccades x 30" vertical and horizontal   Difficulty " "gazing from target to target, feels like target gets closer, unable to gaze smoothly or keep pace, corrective saccades noted   VOR x 1 x 30" vertical and horizontal  Extreme difficulty keeping focus, unable to keep pace with head turns   Convergence - pen pushes x 10x   Abnormal convergence   Visual motion sensitivity  x 2 x 5 each, vertical and horizontal                     Plan for Next Visit:        Patient Education and Home Exercises       Home Exercises Provided and Patient Education Provided     Education provided:   PURPOSE: Patient educated on the impairments noted above and the effects of physical therapy intervention to improve overall condition and QOL.   EXERCISE: Patient was educated on all the above exercise prior/during/after for proper posture, positioning, and execution for safe performance with home exercise program.   POSTURE: Patient educated on postural awareness to reduce stress and maintain optimal alignment of the spine with static positions and dynamic movement     Written Home Exercises Provided: yes.  Exercises were reviewed and Kasie was able to demonstrate them prior to the end of the session.  Kasie demonstrated good  understanding of the education provided. See EMR under Patient Instructions for exercises provided during therapy sessions.    Assessment     Patient tolerated treatment well. Exercises performed slowly when appropriate and breaks provided between exercises as to not aggravate s/s too much. Extensive education spent with each exercise as well. Patient demonstrates difficulty with all vestibular activities today. She tends to "back away" from targets, feeling like they get closer to her. She has difficulty tracking and gazing smoothly, demonstrating both nystagmus and corrective saccades at times. Patient would benefit from continued VOR activities as well as progressing to performing these activities in more functional standing positions in upcoming visits.     Kasie is " progressing well towards her goals.   Patient prognosis is Guarded.     Patient will continue to benefit from skilled outpatient physical therapy to address the deficits listed in the problem list box on initial evaluation, provide pt/family education and to maximize patient's level of independence in the home and community environment.     Patient's spiritual, cultural and educational needs considered and pt agreeable to plan of care and goals.     Anticipated Barriers for therapy: co-morbidities, chronicity of condition, lack of understanding of condition, adherence to treatment plan, and transportation     GOALS:     Short Term Goals:  6 weeks Progress   Dizziness: Patient will report decreased dizziness, and recent s/s trend is improving in order to progress towards LTG's. PC   Function: Patient will demonstrate improved function as indicated by a functional status score of less than or equal to 54 out of 100 on FOTO. PC   Gait: Patient will demonstrate improved gait mechanics in order to improve functional mobility, improve quality of life, and decrease risk of further injury or fall.  PC   HEP: Patient will demonstrate independence with HEP in order to progress toward functional independence. PC   Improve postural awareness.  PC         Long Term Goals:  12 weeks Progress   Dizziness:  Patient will return to normal ADL, recreational, and work related activities with less dizziness and limitation.  PC   Function: Patient will demonstrate improved function as indicated by a functional status score of less than or equal to 50 out of 100 on FOTO. PC   Gait: Patient will demonstrate normalized gait mechanics with minimal compensation in order to return to PLOF. PC   Decrease saccades as well as positional and VOR related s/s in order for patient to be able to switch positions or look quickly to different directions without onset of dizziness.  PC      Goals Key:  PC= progressing/continue;        PM= partially met;              DC= discontinue    Plan     Continue Plan of Care (POC) and progress per patient tolerance. See treatment section for details on planned progressions next session.     6/15/2023 (evaluation): Outpatient Physical Therapy 1 times weekly for 12 weeks to include any combination of the following interventions: vestibular habituation exercises, dry needling, modalities, electrical stimulation (IFC, Pre-Mod, Attended with Functional Dry Needling), Cervical/Lumbar Traction, Gait Training, Manual Therapy, Neuromuscular Re-ed, Patient Education, Self Care, Therapeutic Exercise, and Therapeutic Activites     Tiffany Ryan, PT

## 2023-07-11 PROBLEM — R41.841 COGNITIVE COMMUNICATION DISORDER: Status: ACTIVE | Noted: 2023-07-11
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
## 2023-07-21 ENCOUNTER — CLINICAL SUPPORT (OUTPATIENT)
Dept: REHABILITATION | Facility: HOSPITAL | Age: 53
End: 2023-07-21
Attending: STUDENT IN AN ORGANIZED HEALTH CARE EDUCATION/TRAINING PROGRAM
Payer: COMMERCIAL

## 2023-07-21 ENCOUNTER — CLINICAL SUPPORT (OUTPATIENT)
Dept: REHABILITATION | Facility: HOSPITAL | Age: 53
End: 2023-07-21
Payer: COMMERCIAL

## 2023-07-21 DIAGNOSIS — R26.9 ABNORMALITY OF GAIT AND MOBILITY: ICD-10-CM

## 2023-07-21 DIAGNOSIS — R41.841 COGNITIVE COMMUNICATION DISORDER: Primary | ICD-10-CM

## 2023-07-21 DIAGNOSIS — R42 DIZZINESS: ICD-10-CM

## 2023-07-21 DIAGNOSIS — H81.90 PERIPHERAL VESTIBULOPATHY, UNSPECIFIED LATERALITY: Primary | ICD-10-CM

## 2023-07-21 PROCEDURE — 97112 NEUROMUSCULAR REEDUCATION: CPT | Performed by: PHYSICAL THERAPIST

## 2023-07-21 PROCEDURE — 97129 THER IVNTJ 1ST 15 MIN: CPT | Performed by: SPEECH-LANGUAGE PATHOLOGIST

## 2023-07-21 PROCEDURE — 97130 THER IVNTJ EA ADDL 15 MIN: CPT | Performed by: SPEECH-LANGUAGE PATHOLOGIST

## 2023-07-21 NOTE — PROGRESS NOTES
"[facility]   Physical Therapy Treatment Note + Progress Note       Name: Kasie Quevedo  Clinic Number: [id number]    Therapy Diagnosis:   Encounter Diagnoses   Name Primary?    Peripheral vestibulopathy, unspecified laterality Yes    Dizziness     Abnormality of gait and mobility      Physician: Jose Ramon Roland MD    Visit Date: 7/21/2023    Physician Orders: PT Eval and Treat  Medical Diagnosis from Referral: peripheral vestibulopathy   Evaluation Date: 6/15/2023  Authorization Period Expiration: 6/12/2024  Plan of Care Expiration: 9/14/2023  Visit # / Visits authorized: 2/20 (+eval)  FOTO: 1/3     Progress Note Due on 7/16/2023     Precautions: Standard    PTA Visit #: 0/5     Time In: 1432  Time Out: 1517  Total Billable Time: 42 minutes (Billing reflects 1 on 1 treatment time spent with patient)    Subjective     Patient reports: that she has been feeling a little more "wobbly" than normal recently. She felt okay after last visit, with s/s remaining about the same. Patient reports that she has noticed a little progress with turning her head while in the car. Busy environments also still make her feel off-balance or dizzy.     He/She was compliant with home exercise program.  Response to previous treatment: no adverse reactions  Functional change: pt does report slight improvements in turning her head since performing her exercises    Pain: 6/10     Location: headache, neck, shoulder, back, radicular L UE s/s    Objective      Objective Measures updated at progress report or POC update only unless otherwise noted.        Posture: Pt presents with postural abnormalities which include: forward head and rounded shoulders                        Visual/Auditory:   Tracking/Smooth Pursuits:Impaired: Difficulty tracking smoothly, moderate nystagmus noted, increases s/s mildly, sees double today with vertical tracking (improved with slowing jennifer)  Gaze Stabilization: Negative   Head " "Shake: Positive  Head Thrust: Positive - Left  Saccades: Impaired: Difficulty gazing from target to target, feels like target gets closer, unable to gaze smoothly or keep pace, corrective saccades noted  Convergence: impaired, 20 cm (improved)  VOR: Impaired: Extreme difficulty keeping focus, unable to keep pace with head turns  VCR: Impaired: increases s/s, loses balance (head remains still, body moves)         POSITIONAL CANAL TESTING -   Looking for nystagmus (slow drift to affected side with quick correction away)     Brian Hallpike (posterior / CL anterior)              Right : NT              Left: NT  Horizontal Canals              Right: NT              Left: NT        Modified VAS (Vertebral Artery Screen), in sitting (rotation, then extension):  R: Negative   L: Negative        MUSCLE LENGTH:      Muscle Tested  Right Left  Goal   Upper Trapezius  decreased decreased Normal B    Levator Scapulae  decreased decreased Normal B   Sternocleidomastoid decreased decreased Normal B   Scalenes  decreased decreased Normal B    Pectoralis Minor  decreased decreased Normal B         Gait Analysis: The pt presents with the following gait abnormalities: bradykinetic, increased SHAWNA, decreased step length bilateral, and decreased hip extension bilateral        Function:      Treatment     Kasie received the treatments listed below:     NEUROMUSCULAR RE-EDUCATION ACTIVITIES to improve Balance, Coordination, Kinesthetic, Sense, Proprioception, and Posture for (42) minutes.  The following were included:    Intervention Performed Today    Smooth Pursuits x 30" vertical and 30" horizontal   Difficulty tracking smoothly, moderate nystagmus noted, increases s/s mildly, sees double today with vertical tracking (improved with slowing jennifer)   Saccades x 30" vertical and horizontal   Difficulty gazing from target to target, feels like target gets closer, unable to gaze smoothly or keep pace, corrective saccades noted   VOR x 1 x " "30" vertical and horizontal  Extreme difficulty keeping focus, unable to keep pace with head turns   Convergence - pen pushes x 10x   Abnormal convergence   Visual motion sensitivity  x 2 x 5 each, vertical and horizontal              Progress Note x Re-assessment as above in objective section      Plan for Next Visit: progress to standing vestib ex if possible     Patient Education and Home Exercises       Home Exercises Provided and Patient Education Provided     Education provided:   PURPOSE: Patient educated on the impairments noted above and the effects of physical therapy intervention to improve overall condition and QOL.   EXERCISE: Patient was educated on all the above exercise prior/during/after for proper posture, positioning, and execution for safe performance with home exercise program.   POSTURE: Patient educated on postural awareness to reduce stress and maintain optimal alignment of the spine with static positions and dynamic movement     Written Home Exercises Provided: yes.  Exercises were reviewed and Kasie was able to demonstrate them prior to the end of the session.  Kasie demonstrated good  understanding of the education provided. See EMR under Patient Instructions for exercises provided during therapy sessions.    Assessment     Patient tolerated treatment well and demonstrated better overall tolerance to treatment this visit. Exercises performed slowly when appropriate but less breaks required throughout treatment. Patient required less cues, and she did not feel like she had to back away from targets as much during exercises. Patient demonstrates less difficulty with all vestibular activities today as compared to previous visit, and slight decrease in nystagmus and corrective saccades noted. Patient still has difficulty tracking and gazing smoothly. She tends to have trouble finding targets with saccades. Patient would benefit from continued PT, including VOR activities as well as progressing " to performing these activities in more functional standing positions in upcoming visits.     Kasie is progressing well towards her goals.   Patient prognosis is Guarded.     Patient will continue to benefit from skilled outpatient physical therapy to address the deficits listed in the problem list box on initial evaluation, provide pt/family education and to maximize patient's level of independence in the home and community environment.     Patient's spiritual, cultural and educational needs considered and pt agreeable to plan of care and goals.     Anticipated Barriers for therapy: co-morbidities, chronicity of condition, lack of understanding of condition, adherence to treatment plan, and transportation     GOALS:     Short Term Goals:  6 weeks Progress   Dizziness: Patient will report decreased dizziness, and recent s/s trend is improving in order to progress towards LTG's. PC   Function: Patient will demonstrate improved function as indicated by a functional status score of less than or equal to 54 out of 100 on FOTO. PC   Gait: Patient will demonstrate improved gait mechanics in order to improve functional mobility, improve quality of life, and decrease risk of further injury or fall.  PC   HEP: Patient will demonstrate independence with HEP in order to progress toward functional independence. Met  7/21/2023   Improve postural awareness.  PC         Long Term Goals:  12 weeks Progress   Dizziness:  Patient will return to normal ADL, recreational, and work related activities with less dizziness and limitation.  PC   Function: Patient will demonstrate improved function as indicated by a functional status score of less than or equal to 50 out of 100 on FOTO. PC   Gait: Patient will demonstrate normalized gait mechanics with minimal compensation in order to return to PLOF. PC   Decrease saccades as well as positional and VOR related s/s in order for patient to be able to switch positions or look quickly to  different directions without onset of dizziness.  PC      Goals Key:  PC= progressing/continue;        PM= partially met;             DC= discontinue    Plan     Continue Plan of Care (POC) and progress per patient tolerance. See treatment section for details on planned progressions next session.    7/21/2023: It is my recommendation that patient continue with PT at her current frequency of 2 times per week for the remainder of her approved visits. Her treatment plan will remain the same, and she will be progressed appropriately.      6/15/2023 (evaluation): Outpatient Physical Therapy 1 times weekly for 12 weeks to include any combination of the following interventions: vestibular habituation exercises, dry needling, modalities, electrical stimulation (IFC, Pre-Mod, Attended with Functional Dry Needling), Cervical/Lumbar Traction, Gait Training, Manual Therapy, Neuromuscular Re-ed, Patient Education, Self Care, Therapeutic Exercise, and Therapeutic Activites     Tiffany Ryan, PT

## 2023-07-21 NOTE — PROGRESS NOTES
"[facility]  Speech Therapy Treatment Note- Neurological Rehabilitation  Date: 7/21/2023     Name: Kasie Quevedo   MRN: [id number]   Therapy Diagnosis:   Encounter Diagnosis   Name Primary?    Cognitive communication disorder Yes   Physician: Jose Ramon Roland MD  Physician Orders: Ambulatory Referral to Speech Therapy   Medical Diagnosis: Difficulty with speech [R47.9]    Visit # / Visits Authorized:  1 / 20 (+eval)  Date of Evaluation:  7/10/2023   Insurance Authorization Period: 7/21/2023 - 12/31/2023  Plan of Care Certification:    7/10/2023 to 10/03/23   Progress Note: 09/01/2023     Time In:  1:25 PM  Time Out:  2:03 PM  Total Billable Time: 38 minutes     Precautions: Standard and Fall  Subjective:   Patient reports:  She has been doing good and she is eager to start therapy.   She was compliant to home exercise program.     Response to previous treatment: NA    Pain Scale: no pain indicated throughout session  Objective:   TIMED  Procedure Min.   Cognitive Therapeutic Interventions, first 15 minutes CPT 00666  15   Cognitive Therapeutic Interventions, each additional 15 minutes CPT 74353  23           Short Term Goals: (6 weeks) Current Progress:   Patient will sustain attention to complete moderate to complex reasoning tasks with one request for clarification to increase sustained attention.      Progressing/ Not Met 7/21/2023    Auditory Sustained Attention  Patient completed auditory sustained attention task in which she was asked to listen for specific letter/number combination and write down letter than came prior to specified numbers in a 5 minute long "radio broadcast". She completed the task with 60% (3/5 trials) with use of strategies (I.e note taking) for 2 trials and rated this as a 4/7 on the relative scale of cognitive load. Will continue to address in upcoming sessions with discussion of strategy use.     2. Patient will complete selective attention tasks (auditory or " visual) with 90% accuracy independently increase selective attention.     Progressing/ Not Met 7/21/2023   Not formally addressed        3. Patient will use attention shifting strategies to shift attention between two tasks (auditory or visual) with no more than 3 cues or 90% accuracy to improve alternating attention.      Progressing/ Not Met 7/21/2023   Not formally addressed      4. Patient will complete mental manipulation tasks with 90% acc to improve working memory.        Progressing/ Not Met 7/21/2023   Not formally addressed      5. Patient will use Goal Plan Action Review strategy to complete moderate to complex reasoning, planning, or organization tasks with 90% accuracy independently to improve functional executive function skills.      Progressing/ Not Met 7/21/2023   Not formally addressed      6. Patient will determine a list of possible solutions, given a problem scenario in a structured setting with 90% accuracy across 2 sessions.      Progressing/ Not Met 7/21/2023   Not formally addressed         7. Patient will complete a functional task to improve attention, memory and/or executive functions (I.e. sample bill paying activity, recipe, or multiple choice comprehension questions to 1 paragraphs) with 80% accuracy and natural environment noise distractions (TV news background, music, etc.).     Progressing/ Not Met 7/21/2023   Not formally addressed      8. Patient will be educated regarding cognitive deficits as applicable to the patient's life for increased awareness and will execute returned demonstration of information with 80% accuracy.      Goal Met 7/10/2023   Patient was educated thoroughly regarding a variety of foundational cognitive elements related to mild TBI including the cognitive triangle (with emphasis on foundational versus higher level cognitive functions, awareness of deficits, impact of attention, information processing, memory, and executive functioning deficits as each area  relates to assessment findings), spoon theory to discuss cognitive versus physical versus emotional fatigue and management of each to more efficiently use energy daily for more or less cognitively demanding tasks, internal versus external distractions and management of each, as well as overall impact of these deficits on daily functioning. Patient demonstrated awareness of information provided as demonstrated by asking good questions and expressing understanding.      9. Patient will utilize various strategies such as efficient visual scanning and organization to facilitate completion of a task in a dynamic environment (various sensory stimuli) with minimal assistance for use of strategies.      Progressing/ Not Met 7/21/2023    Not formally addressed              Patient Education/Response:   Patient educated regarding the following:  Patient was educated thoroughly regarding a variety of foundational cognitive elements related to mild TBI including the cognitive triangle (with emphasis on foundational versus higher level cognitive functions, awareness of deficits, impact of attention, information processing, memory, and executive functioning deficits as each area relates to assessment findings), spoon theory to discuss cognitive versus physical versus emotional fatigue and management of each to more efficiently use energy daily for more or less cognitively demanding tasks, internal versus external distractions and management of each, as well as overall impact of these deficits on daily functioning. Patient demonstrated awareness of information provided as demonstrated by asking good questions and expressing understanding.    Home program established: yes-management of energy/fatigue; being mindful of distractions in the environment   Patient verbalized understanding to all above education provided.     See Electronic Medical Record under Patient Instructions for exercises provided throughout therapy.  Assessment:    Patient arrived ~10 minutes late to session. Patient was engaged throughout extensive discussion regarding cognitive fatigue and energy levels throughout the day with reference to fatigue scale, spoon theory, taking cognitive rest breaks, and awareness of distractions in the environment. Patient expressed understanding of information provided. Will continue to address auditory sustained attention in upcoming session.    Kasie is progressing well towards her goals. Current goals remain appropriate. Goals to be updated as necessary.     Patient prognosis is Good. Patient will continue to benefit from skilled outpatient speech and language therapy to address the deficits listed in the problem list on initial evaluation, provide patient/family education and to maximize patient's level of independence in the home and community environment.     Medical necessity is demonstrated by the following IMPAIRMENTS:  Cognition: Deficits in executive functioning, attention, and memory prevent the pt from relaying medically and safety relevant information in a timely manner in a state of emergency.     Barriers to Therapy: NA  Patient's spiritual, cultural and educational needs considered and patient agreeable to plan of care and goals.  Plan:   Continue Plan of Care with focus on rehabilitation and compensation for mild to moderate cognitive impairment.     EBONI Winters  Student Speech Language Pathologist     Rhianna Perez MA, CCC-SLP  Speech Language Pathologist  7/21/2023

## 2023-07-21 NOTE — PLAN OF CARE
"[facility]   Physical Therapy Treatment Note + Progress Note       Name: Kasie Quevedo  Clinic Number: [id number]    Therapy Diagnosis:   Encounter Diagnoses   Name Primary?    Peripheral vestibulopathy, unspecified laterality Yes    Dizziness     Abnormality of gait and mobility      Physician: Jose Ramon Roland MD    Visit Date: 7/21/2023    Physician Orders: PT Eval and Treat  Medical Diagnosis from Referral: peripheral vestibulopathy   Evaluation Date: 6/15/2023  Authorization Period Expiration: 6/12/2024  Plan of Care Expiration: 9/14/2023  Visit # / Visits authorized: 2/20 (+eval)  FOTO: 1/3     Progress Note Due on 7/16/2023     Precautions: Standard    PTA Visit #: 0/5     Time In: 1432  Time Out: 1517  Total Billable Time: 42 minutes (Billing reflects 1 on 1 treatment time spent with patient)    Subjective     Patient reports: that she has been feeling a little more "wobbly" than normal recently. She felt okay after last visit, with s/s remaining about the same. Patient reports that she has noticed a little progress with turning her head while in the car. Busy environments also still make her feel off-balance or dizzy.     He/She was compliant with home exercise program.  Response to previous treatment: no adverse reactions  Functional change: pt does report slight improvements in turning her head since performing her exercises    Pain: 6/10     Location: headache, neck, shoulder, back, radicular L UE s/s    Objective      Objective Measures updated at progress report or POC update only unless otherwise noted.        Posture: Pt presents with postural abnormalities which include: forward head and rounded shoulders                        Visual/Auditory:   Tracking/Smooth Pursuits:Impaired: Difficulty tracking smoothly, moderate nystagmus noted, increases s/s mildly, sees double today with vertical tracking (improved with slowing jennifer)  Gaze Stabilization: Negative   Head " "Shake: Positive  Head Thrust: Positive - Left  Saccades: Impaired: Difficulty gazing from target to target, feels like target gets closer, unable to gaze smoothly or keep pace, corrective saccades noted  Convergence: impaired, 20 cm (improved)  VOR: Impaired: Extreme difficulty keeping focus, unable to keep pace with head turns  VCR: Impaired: increases s/s, loses balance (head remains still, body moves)         POSITIONAL CANAL TESTING -   Looking for nystagmus (slow drift to affected side with quick correction away)     Brian Hallpike (posterior / CL anterior)              Right : NT              Left: NT  Horizontal Canals              Right: NT              Left: NT        Modified VAS (Vertebral Artery Screen), in sitting (rotation, then extension):  R: Negative   L: Negative        MUSCLE LENGTH:      Muscle Tested  Right Left  Goal   Upper Trapezius  decreased decreased Normal B    Levator Scapulae  decreased decreased Normal B   Sternocleidomastoid decreased decreased Normal B   Scalenes  decreased decreased Normal B    Pectoralis Minor  decreased decreased Normal B         Gait Analysis: The pt presents with the following gait abnormalities: bradykinetic, increased SHAWNA, decreased step length bilateral, and decreased hip extension bilateral        Function:      Treatment     Kasie received the treatments listed below:     NEUROMUSCULAR RE-EDUCATION ACTIVITIES to improve Balance, Coordination, Kinesthetic, Sense, Proprioception, and Posture for (42) minutes.  The following were included:    Intervention Performed Today    Smooth Pursuits x 30" vertical and 30" horizontal   Difficulty tracking smoothly, moderate nystagmus noted, increases s/s mildly, sees double today with vertical tracking (improved with slowing jennifer)   Saccades x 30" vertical and horizontal   Difficulty gazing from target to target, feels like target gets closer, unable to gaze smoothly or keep pace, corrective saccades noted   VOR x 1 x " "30" vertical and horizontal  Extreme difficulty keeping focus, unable to keep pace with head turns   Convergence - pen pushes x 10x   Abnormal convergence   Visual motion sensitivity  x 2 x 5 each, vertical and horizontal              Progress Note x Re-assessment as above in objective section      Plan for Next Visit: progress to standing vestib ex if possible     Patient Education and Home Exercises       Home Exercises Provided and Patient Education Provided     Education provided:   PURPOSE: Patient educated on the impairments noted above and the effects of physical therapy intervention to improve overall condition and QOL.   EXERCISE: Patient was educated on all the above exercise prior/during/after for proper posture, positioning, and execution for safe performance with home exercise program.   POSTURE: Patient educated on postural awareness to reduce stress and maintain optimal alignment of the spine with static positions and dynamic movement     Written Home Exercises Provided: yes.  Exercises were reviewed and Kasie was able to demonstrate them prior to the end of the session.  Kasie demonstrated good  understanding of the education provided. See EMR under Patient Instructions for exercises provided during therapy sessions.    Assessment     Patient tolerated treatment well and demonstrated better overall tolerance to treatment this visit. Exercises performed slowly when appropriate but less breaks required throughout treatment. Patient required less cues, and she did not feel like she had to back away from targets as much during exercises. Patient demonstrates less difficulty with all vestibular activities today as compared to previous visit, and slight decrease in nystagmus and corrective saccades noted. Patient still has difficulty tracking and gazing smoothly. She tends to have trouble finding targets with saccades. Patient would benefit from continued PT, including VOR activities as well as progressing " to performing these activities in more functional standing positions in upcoming visits.     Kasie is progressing well towards her goals.   Patient prognosis is Guarded.     Patient will continue to benefit from skilled outpatient physical therapy to address the deficits listed in the problem list box on initial evaluation, provide pt/family education and to maximize patient's level of independence in the home and community environment.     Patient's spiritual, cultural and educational needs considered and pt agreeable to plan of care and goals.     Anticipated Barriers for therapy: co-morbidities, chronicity of condition, lack of understanding of condition, adherence to treatment plan, and transportation     GOALS:     Short Term Goals:  6 weeks Progress   Dizziness: Patient will report decreased dizziness, and recent s/s trend is improving in order to progress towards LTG's. PC   Function: Patient will demonstrate improved function as indicated by a functional status score of less than or equal to 54 out of 100 on FOTO. PC   Gait: Patient will demonstrate improved gait mechanics in order to improve functional mobility, improve quality of life, and decrease risk of further injury or fall.  PC   HEP: Patient will demonstrate independence with HEP in order to progress toward functional independence. Met  7/21/2023   Improve postural awareness.  PC         Long Term Goals:  12 weeks Progress   Dizziness:  Patient will return to normal ADL, recreational, and work related activities with less dizziness and limitation.  PC   Function: Patient will demonstrate improved function as indicated by a functional status score of less than or equal to 50 out of 100 on FOTO. PC   Gait: Patient will demonstrate normalized gait mechanics with minimal compensation in order to return to PLOF. PC   Decrease saccades as well as positional and VOR related s/s in order for patient to be able to switch positions or look quickly to  different directions without onset of dizziness.  PC      Goals Key:  PC= progressing/continue;        PM= partially met;             DC= discontinue    Plan     Continue Plan of Care (POC) and progress per patient tolerance. See treatment section for details on planned progressions next session.    7/21/2023: It is my recommendation that patient continue with PT at her current frequency of 2 times per week for the remainder of her approved visits. Her treatment plan will remain the same, and she will be progressed appropriately.      6/15/2023 (evaluation): Outpatient Physical Therapy 1 times weekly for 12 weeks to include any combination of the following interventions: vestibular habituation exercises, dry needling, modalities, electrical stimulation (IFC, Pre-Mod, Attended with Functional Dry Needling), Cervical/Lumbar Traction, Gait Training, Manual Therapy, Neuromuscular Re-ed, Patient Education, Self Care, Therapeutic Exercise, and Therapeutic Activites     Tiffany Ryan, PT

## 2023-08-02 ENCOUNTER — CLINICAL SUPPORT (OUTPATIENT)
Dept: REHABILITATION | Facility: HOSPITAL | Age: 53
End: 2023-08-02
Attending: STUDENT IN AN ORGANIZED HEALTH CARE EDUCATION/TRAINING PROGRAM
Payer: COMMERCIAL

## 2023-08-02 ENCOUNTER — CLINICAL SUPPORT (OUTPATIENT)
Dept: REHABILITATION | Facility: HOSPITAL | Age: 53
End: 2023-08-02
Payer: COMMERCIAL

## 2023-08-02 DIAGNOSIS — H81.90 PERIPHERAL VESTIBULOPATHY, UNSPECIFIED LATERALITY: Primary | ICD-10-CM

## 2023-08-02 DIAGNOSIS — R26.9 ABNORMALITY OF GAIT AND MOBILITY: ICD-10-CM

## 2023-08-02 DIAGNOSIS — R42 DIZZINESS: ICD-10-CM

## 2023-08-02 DIAGNOSIS — R41.841 COGNITIVE COMMUNICATION DISORDER: Primary | ICD-10-CM

## 2023-08-02 PROCEDURE — 97130 THER IVNTJ EA ADDL 15 MIN: CPT | Performed by: SPEECH-LANGUAGE PATHOLOGIST

## 2023-08-02 PROCEDURE — 97112 NEUROMUSCULAR REEDUCATION: CPT | Performed by: PHYSICAL THERAPIST

## 2023-08-02 PROCEDURE — 97129 THER IVNTJ 1ST 15 MIN: CPT | Performed by: SPEECH-LANGUAGE PATHOLOGIST

## 2023-08-02 NOTE — PROGRESS NOTES
[facility]  Speech Therapy Treatment Note- Neurological Rehabilitation  Date: 8/2/2023     Name: Kasie Quevedo   MRN: [id number]   Therapy Diagnosis:   Encounter Diagnosis   Name Primary?    Cognitive communication disorder Yes   Physician: Jose Ramon Roland MD  Physician Orders: Ambulatory Referral to Speech Therapy   Medical Diagnosis: Difficulty with speech [R47.9]    Visit # / Visits Authorized:  2 / 20 (+eval)  Date of Evaluation:  7/10/2023   Insurance Authorization Period: 7/21/2023 - 12/31/2023  Plan of Care Certification:    7/10/2023 to 10/03/23   Progress Note: 09/01/2023     Time In:  2:39 PM  Time Out:  3:20 PM  Total Billable Time: 41 minutes     Precautions: Standard and Fall  Subjective:   Patient reports:  she is doing well. She reports attention deficits are what are causing majority of deficits. She reports with unilateral hearing loss in L ear, she is increasingly distracted by trying to localize sound as well as tinnitus in L ear.   She was compliant to home exercise program.     Response to previous treatment: NA    Pain Scale: no pain indicated throughout session  Objective:   TIMED  Procedure Min.   Cognitive Therapeutic Interventions, first 15 minutes CPT 49799  15   Cognitive Therapeutic Interventions, each additional 15 minutes CPT 20329  26           Short Term Goals: (6 weeks) Current Progress:   Patient will sustain attention to complete moderate to complex reasoning tasks with one request for clarification to increase sustained attention.      Progressing/ Not Met 8/2/2023     Auditory: met x2 Auditory Sustained Attention  Patient completed auditory sustained attention task in which she was asked to listen to voicemails and answer questions about what she heard. Task completed in distraction-free environment. She listened to 6 voicemails, taking detailed notes and requesting frequent repetition of information she heard. She answered questions with 100% accuracy and  reported 5/7 on relative scale of cognitive load. Patient reports this is very common as she often has to repeat information or request repetition often as well as take very detailed notes to recall auditory information.      2. Patient will complete selective attention tasks (auditory or visual) with 90% accuracy independently increase selective attention.     Progressing/ Not Met 8/2/2023   Not formally addressed        3. Patient will use attention shifting strategies to shift attention between two tasks (auditory or visual) with no more than 3 cues or 90% accuracy to improve alternating attention.      Progressing/ Not Met 8/2/2023   Not formally addressed      4. Patient will complete mental manipulation tasks with 90% acc to improve working memory.        Progressing/ Not Met 8/2/2023   Not formally addressed      5. Patient will use Goal Plan Action Review strategy to complete moderate to complex reasoning, planning, or organization tasks with 90% accuracy independently to improve functional executive function skills.      Progressing/ Not Met 8/2/2023   Not formally addressed      6. Patient will determine a list of possible solutions, given a problem scenario in a structured setting with 90% accuracy across 2 sessions.      Progressing/ Not Met 8/2/2023   Not formally addressed         7. Patient will complete a functional task to improve attention, memory and/or executive functions (I.e. sample bill paying activity, recipe, or multiple choice comprehension questions to 1 paragraphs) with 80% accuracy and natural environment noise distractions (TV news background, music, etc.).     Progressing/ Not Met 8/2/2023   Not formally addressed      8. Patient will be educated regarding cognitive deficits as applicable to the patient's life for increased awareness and will execute returned demonstration of information with 80% accuracy.      Goal Met 7/10/2023   Patient was educated thoroughly regarding a variety of  foundational cognitive elements related to mild TBI including the cognitive triangle (with emphasis on foundational versus higher level cognitive functions, awareness of deficits, impact of attention, information processing, memory, and executive functioning deficits as each area relates to assessment findings), spoon theory to discuss cognitive versus physical versus emotional fatigue and management of each to more efficiently use energy daily for more or less cognitively demanding tasks, internal versus external distractions and management of each, as well as overall impact of these deficits on daily functioning. Patient demonstrated awareness of information provided as demonstrated by asking good questions and expressing understanding.      9. Patient will utilize various strategies such as efficient visual scanning and organization to facilitate completion of a task in a dynamic environment (various sensory stimuli) with minimal assistance for use of strategies.      Progressing/ Not Met 8/2/2023    Not formally addressed              Patient Education/Response:   Patient educated regarding the following:  Patient was educated thoroughly regarding a variety of foundational cognitive elements related to mild TBI including the cognitive triangle (with emphasis on foundational versus higher level cognitive functions, awareness of deficits, impact of attention, information processing, memory, and executive functioning deficits as each area relates to assessment findings), spoon theory to discuss cognitive versus physical versus emotional fatigue and management of each to more efficiently use energy daily for more or less cognitively demanding tasks, internal versus external distractions and management of each, as well as overall impact of these deficits on daily functioning. Patient demonstrated awareness of information provided as demonstrated by asking good questions and expressing understanding.    Home program  established: yes-management of energy/fatigue; being mindful of distractions in the environment   Patient verbalized understanding to all above education provided.     See Electronic Medical Record under Patient Instructions for exercises provided throughout therapy.  Assessment:   Patient was engaged throughout extensive discussion regarding cognitive fatigue and energy levels throughout the day with reference to fatigue scale, spoon theory, taking cognitive rest breaks, and awareness of distractions in the environment. Patient expressed understanding of information provided. Progress toward auditory sustained attention goal; she is able to achieve high accuracy, however, requires significant use of strategies to do so such as frequent and multiple repetitions and note taking resulting in high cognitive load. Will continue to progress per tolerance in next session, with discussion of less strategies as able.    Kasie is progressing well towards her goals. Current goals remain appropriate. Goals to be updated as necessary.     Patient prognosis is Good. Patient will continue to benefit from skilled outpatient speech and language therapy to address the deficits listed in the problem list on initial evaluation, provide patient/family education and to maximize patient's level of independence in the home and community environment.     Medical necessity is demonstrated by the following IMPAIRMENTS:  Cognition: Deficits in executive functioning, attention, and memory prevent the pt from relaying medically and safety relevant information in a timely manner in a state of emergency.     Barriers to Therapy: NA  Patient's spiritual, cultural and educational needs considered and patient agreeable to plan of care and goals.  Plan:   Continue Plan of Care with focus on rehabilitation and compensation for mild to moderate cognitive impairment.     Rhianna Perez MA, CCC-SLP  Speech Language Pathologist  8/2/2023

## 2023-08-02 NOTE — PROGRESS NOTES
"[facility]   Physical Therapy Treatment Note        Name: Kasie Quevedo  Clinic Number: [id number]    Therapy Diagnosis:   Encounter Diagnoses   Name Primary?    Peripheral vestibulopathy, unspecified laterality Yes    Dizziness     Abnormality of gait and mobility      Physician: Jose Ramon Roland MD    Visit Date: 8/2/2023    Physician Orders: PT Eval and Treat  Medical Diagnosis from Referral: peripheral vestibulopathy   Evaluation Date: 6/15/2023  Authorization Period Expiration: 6/12/2024  Plan of Care Expiration: 9/14/2023  Visit # / Visits authorized: 3/20 (+eval)  FOTO: 1/3     Progress Note Due on 7/16/2023     Precautions: Standard    PTA Visit #: 0/5     Time In: 1350  Time Out: 1430  Total Billable Time: 40 minutes (Billing reflects 1 on 1 treatment time spent with patient)    Subjective     Patient reports: that she feels a little off from the sun outside when walking in, and she had a slight headache this morning. However, right now her head isn't bothering her, but her low back is still hurting. Patient reports that she feels a little better overall, but not her best.     He/She was compliant with home exercise program.  Response to previous treatment: no adverse reactions  Functional change: pt does report slight improvements in turning her head since performing her exercises    Pain: 6/10     Location: headache, neck, shoulder, back, radicular L UE s/s    Objective      Objective Measures updated at progress report or POC update only unless otherwise noted.       Treatment     Kasie received the treatments listed below:     NEUROMUSCULAR RE-EDUCATION ACTIVITIES to improve Balance, Coordination, Kinesthetic, Sense, Proprioception, and Posture for (40) minutes.  The following were included:    Intervention Performed Today    Smooth Pursuits - standing x 30" vertical and 30" horizontal   Difficulty tracking smoothly, moderate nystagmus noted, increases s/s mildly, sees " "double today with vertical tracking, severe postural sway    Saccades X    x 30" vertical and horizontal   Difficulty keeping pace but improved.   Colored sticky notes with letters on it, calling out letters and colors in different orders, 3 x 30"   VOR x 1 x 30" vertical and horizontal  Moderate difficulty keeping focus, unable to keep pace with head turns   Convergence - pen pushes x 10x   Abnormal convergence   Visual motion sensitivity  x 2 x 5 each, vertical and horizontal              Progress Note  Re-assessment as above in objective section      Plan for Next Visit: progress to standing vestib ex if possible, cone convergence      Patient Education and Home Exercises       Home Exercises Provided and Patient Education Provided     Education provided:   PURPOSE: Patient educated on the impairments noted above and the effects of physical therapy intervention to improve overall condition and QOL.   EXERCISE: Patient was educated on all the above exercise prior/during/after for proper posture, positioning, and execution for safe performance with home exercise program.   POSTURE: Patient educated on postural awareness to reduce stress and maintain optimal alignment of the spine with static positions and dynamic movement     Written Home Exercises Provided: yes.  Exercises were reviewed and Kasie was able to demonstrate them prior to the end of the session.  Kasie demonstrated good  understanding of the education provided. See EMR under Patient Instructions for exercises provided during therapy sessions.    Assessment     Patient did well with treatment again today. She completed exercises with less breaks and less backing away again today. She was able to progress vestibular exercises to standing today and did well but did demonstrate minimal to moderate postural sway throughout. Less nystagmus and corrective saccades noted overall as compared to IE, as well as less s/s reported by patient with activities. "     Kasie is progressing well towards her goals.   Patient prognosis is Guarded.     Patient will continue to benefit from skilled outpatient physical therapy to address the deficits listed in the problem list box on initial evaluation, provide pt/family education and to maximize patient's level of independence in the home and community environment.     Patient's spiritual, cultural and educational needs considered and pt agreeable to plan of care and goals.     Anticipated Barriers for therapy: co-morbidities, chronicity of condition, lack of understanding of condition, adherence to treatment plan, and transportation     GOALS:     Short Term Goals:  6 weeks Progress   Dizziness: Patient will report decreased dizziness, and recent s/s trend is improving in order to progress towards LTG's. PC   Function: Patient will demonstrate improved function as indicated by a functional status score of less than or equal to 54 out of 100 on FOTO. PC   Gait: Patient will demonstrate improved gait mechanics in order to improve functional mobility, improve quality of life, and decrease risk of further injury or fall.  PC   HEP: Patient will demonstrate independence with HEP in order to progress toward functional independence. Met  7/21/2023   Improve postural awareness.  PC         Long Term Goals:  12 weeks Progress   Dizziness:  Patient will return to normal ADL, recreational, and work related activities with less dizziness and limitation.  PC   Function: Patient will demonstrate improved function as indicated by a functional status score of less than or equal to 50 out of 100 on FOTO. PC   Gait: Patient will demonstrate normalized gait mechanics with minimal compensation in order to return to PLOF. PC   Decrease saccades as well as positional and VOR related s/s in order for patient to be able to switch positions or look quickly to different directions without onset of dizziness.  PC      Goals Key:  PC= progressing/continue;         PM= partially met;             DC= discontinue    Plan     Continue Plan of Care (POC) and progress per patient tolerance. See treatment section for details on planned progressions next session.    7/21/2023: It is my recommendation that patient continue with PT at her current frequency of 2 times per week for the remainder of her approved visits. Her treatment plan will remain the same, and she will be progressed appropriately.      6/15/2023 (evaluation): Outpatient Physical Therapy 1 times weekly for 12 weeks to include any combination of the following interventions: vestibular habituation exercises, dry needling, modalities, electrical stimulation (IFC, Pre-Mod, Attended with Functional Dry Needling), Cervical/Lumbar Traction, Gait Training, Manual Therapy, Neuromuscular Re-ed, Patient Education, Self Care, Therapeutic Exercise, and Therapeutic Activites     Tiffany Ryan, PT

## 2023-08-03 ENCOUNTER — DOCUMENTATION ONLY (OUTPATIENT)
Dept: OTOLARYNGOLOGY | Facility: CLINIC | Age: 53
End: 2023-08-03
Payer: COMMERCIAL

## 2023-08-03 ENCOUNTER — TELEPHONE (OUTPATIENT)
Dept: OTOLARYNGOLOGY | Facility: CLINIC | Age: 53
End: 2023-08-03
Payer: COMMERCIAL
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
## 2023-08-03 NOTE — TELEPHONE ENCOUNTER
"Notified Pt of Dr. Schofield response. Stated she will submit records and let the  "hash it out" with ENT and neurology.       "

## 2023-08-09 ENCOUNTER — CLINICAL SUPPORT (OUTPATIENT)
Dept: REHABILITATION | Facility: HOSPITAL | Age: 53
End: 2023-08-09
Attending: STUDENT IN AN ORGANIZED HEALTH CARE EDUCATION/TRAINING PROGRAM
Payer: COMMERCIAL

## 2023-08-09 ENCOUNTER — CLINICAL SUPPORT (OUTPATIENT)
Dept: REHABILITATION | Facility: HOSPITAL | Age: 53
End: 2023-08-09
Payer: COMMERCIAL

## 2023-08-09 DIAGNOSIS — R41.841 COGNITIVE COMMUNICATION DISORDER: Primary | ICD-10-CM

## 2023-08-09 DIAGNOSIS — R26.9 ABNORMALITY OF GAIT AND MOBILITY: ICD-10-CM

## 2023-08-09 DIAGNOSIS — H81.90 PERIPHERAL VESTIBULOPATHY, UNSPECIFIED LATERALITY: Primary | ICD-10-CM

## 2023-08-09 DIAGNOSIS — R42 DIZZINESS: ICD-10-CM

## 2023-08-09 PROCEDURE — 97112 NEUROMUSCULAR REEDUCATION: CPT | Performed by: PHYSICAL THERAPIST

## 2023-08-09 PROCEDURE — 97129 THER IVNTJ 1ST 15 MIN: CPT | Performed by: SPEECH-LANGUAGE PATHOLOGIST
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
## 2023-08-09 NOTE — PROGRESS NOTES
"[facility]   Physical Therapy Treatment Note        Name: Kasie Quevedo  Clinic Number: [id number]    Therapy Diagnosis:   Encounter Diagnoses   Name Primary?    Peripheral vestibulopathy, unspecified laterality Yes    Dizziness     Abnormality of gait and mobility      Physician: Jose Ramon Roland MD    Visit Date: 8/9/2023    Physician Orders: PT Eval and Treat  Medical Diagnosis from Referral: peripheral vestibulopathy   Evaluation Date: 6/15/2023  Authorization Period Expiration: 6/12/2024  Plan of Care Expiration: 9/14/2023  Visit # / Visits authorized: 4/20 (+eval)  FOTO: 1/3     Progress Note Due on 8/19/2023     Precautions: Standard    PTA Visit #: 0/5     Time In: 1135  Time Out: 1215  Total Billable Time: 40 minutes (Billing reflects 1 on 1 treatment time spent with patient)    Subjective     Patient reports: that she is feeling okay today. Her headaches have been a little better.     He/She was compliant with home exercise program.  Response to previous treatment: no adverse reactions  Functional change: pt does report slight improvements in turning her head since performing her exercises    Pain: 6/10     Location: headache, neck, shoulder, back, radicular L UE s/s    Objective      Objective Measures updated at progress report or POC update only unless otherwise noted.       Treatment     Kasie received the treatments listed below:     NEUROMUSCULAR RE-EDUCATION ACTIVITIES to improve Balance, Coordination, Kinesthetic, Sense, Proprioception, and Posture for (40) minutes.  The following were included:    Intervention Performed Today    Smooth Pursuits - standing x 30" vertical and 30" horizontal   Difficulty tracking smoothly, minimal nystagmus noted, increases s/s mildly, moderate postural sway    Saccades X    x 30" vertical and horizontal   Difficulty keeping pace but improved.   Colored sticky notes with letters on it, calling out letters and colors in different " "orders, 3 x 30" (last set on airex)   VOR x 1 x 30" vertical and horizontal  Moderate difficulty keeping focus, unable to keep pace with head turns   Convergence - pen pushes - standing x 10x   Abnormal convergence, pt still sways backwards and requires one UE support   Visual motion sensitivity  x 2 x 5 each, vertical and horizontal    DLS on airex (added) x 2 x 30"   Tandem walking on airex (added) x Down and back 4x   Standing on airex beach with rotations (added) x 3 x each side   VCR (added) x 3x each side   Progress Note  Re-assessment as above in objective section      Plan for Next Visit: progress to standing vestib ex if possible, cone convergence        Patient Education and Home Exercises       Home Exercises Provided and Patient Education Provided     Education provided:   PURPOSE: Patient educated on the impairments noted above and the effects of physical therapy intervention to improve overall condition and QOL.   EXERCISE: Patient was educated on all the above exercise prior/during/after for proper posture, positioning, and execution for safe performance with home exercise program.   POSTURE: Patient educated on postural awareness to reduce stress and maintain optimal alignment of the spine with static positions and dynamic movement     Written Home Exercises Provided: yes.  Exercises were reviewed and Kasie was able to demonstrate them prior to the end of the session.  Kasie demonstrated good  understanding of the education provided. See EMR under Patient Instructions for exercises provided during therapy sessions.    Assessment     Patient tolerated treatment well. She completed exercise with less difficulty and with less reports of s/s. Patient was able to be progressed today for additional balance training and activities. She did very well with balance exercises but would benefit from continued work, as she still requires UE assistance or min A from PT at times. She does use ankle and hip " strategies to self correct LOB on her own as well. Patient would benefit from continued balance and vestibular exercises.     Kasie is progressing well towards her goals.   Patient prognosis is Guarded.     Patient will continue to benefit from skilled outpatient physical therapy to address the deficits listed in the problem list box on initial evaluation, provide pt/family education and to maximize patient's level of independence in the home and community environment.     Patient's spiritual, cultural and educational needs considered and pt agreeable to plan of care and goals.     Anticipated Barriers for therapy: co-morbidities, chronicity of condition, lack of understanding of condition, adherence to treatment plan, and transportation     GOALS:     Short Term Goals:  6 weeks Progress   Dizziness: Patient will report decreased dizziness, and recent s/s trend is improving in order to progress towards LTG's. PC   Function: Patient will demonstrate improved function as indicated by a functional status score of less than or equal to 54 out of 100 on FOTO. PC   Gait: Patient will demonstrate improved gait mechanics in order to improve functional mobility, improve quality of life, and decrease risk of further injury or fall.  PC   HEP: Patient will demonstrate independence with HEP in order to progress toward functional independence. Met  7/21/2023   Improve postural awareness.  PC         Long Term Goals:  12 weeks Progress   Dizziness:  Patient will return to normal ADL, recreational, and work related activities with less dizziness and limitation.  PC   Function: Patient will demonstrate improved function as indicated by a functional status score of less than or equal to 50 out of 100 on FOTO. PC   Gait: Patient will demonstrate normalized gait mechanics with minimal compensation in order to return to PLOF. PC   Decrease saccades as well as positional and VOR related s/s in order for patient to be able to switch  positions or look quickly to different directions without onset of dizziness.  PC      Goals Key:  PC= progressing/continue;        PM= partially met;             DC= discontinue    Plan     Continue Plan of Care (POC) and progress per patient tolerance. See treatment section for details on planned progressions next session.    7/21/2023: It is my recommendation that patient continue with PT at her current frequency of 2 times per week for the remainder of her approved visits. Her treatment plan will remain the same, and she will be progressed appropriately.      6/15/2023 (evaluation): Outpatient Physical Therapy 1 times weekly for 12 weeks to include any combination of the following interventions: vestibular habituation exercises, dry needling, modalities, electrical stimulation (IFC, Pre-Mod, Attended with Functional Dry Needling), Cervical/Lumbar Traction, Gait Training, Manual Therapy, Neuromuscular Re-ed, Patient Education, Self Care, Therapeutic Exercise, and Therapeutic Activites     Tiffany Ryan, PT

## 2023-08-16 ENCOUNTER — CLINICAL SUPPORT (OUTPATIENT)
Dept: REHABILITATION | Facility: HOSPITAL | Age: 53
End: 2023-08-16
Payer: COMMERCIAL

## 2023-08-16 DIAGNOSIS — R42 DIZZINESS: ICD-10-CM

## 2023-08-16 DIAGNOSIS — H81.90 PERIPHERAL VESTIBULOPATHY, UNSPECIFIED LATERALITY: Primary | ICD-10-CM

## 2023-08-16 DIAGNOSIS — R41.841 COGNITIVE COMMUNICATION DISORDER: Primary | ICD-10-CM

## 2023-08-16 DIAGNOSIS — R26.9 ABNORMALITY OF GAIT AND MOBILITY: ICD-10-CM

## 2023-08-16 PROCEDURE — 97129 THER IVNTJ 1ST 15 MIN: CPT | Performed by: SPEECH-LANGUAGE PATHOLOGIST

## 2023-08-16 PROCEDURE — 97130 THER IVNTJ EA ADDL 15 MIN: CPT | Performed by: SPEECH-LANGUAGE PATHOLOGIST

## 2023-08-16 PROCEDURE — 97112 NEUROMUSCULAR REEDUCATION: CPT | Performed by: PHYSICAL THERAPIST

## 2023-08-16 NOTE — PROGRESS NOTES
[facility]  Speech Therapy Treatment Note- Neurological Rehabilitation  Date: 8/16/2023     Name: Kasie Quevedo   MRN: [id number]   Therapy Diagnosis:   Encounter Diagnosis   Name Primary?    Cognitive communication disorder Yes   Physician: Jose Ramon Roland MD  Physician Orders: Ambulatory Referral to Speech Therapy   Medical Diagnosis: Difficulty with speech [R47.9]    Visit # / Visits Authorized:  4 / 20 (+eval)  Date of Evaluation:  7/10/2023   Insurance Authorization Period: 7/21/2023 - 12/31/2023  Plan of Care Certification:    7/10/2023 to 10/03/23   Progress Note: 09/01/2023     Time In:  10:17 AM  Time Out:  11:25 AM  Total Billable Time: 68 minutes     Precautions: Standard and Fall  Subjective:   Patient reports: she is doing okay. She arrived about 15 minutes late. She reports she is attempting to implement strategies to improve attention; she reports her stress level/increased stress impacts cognitive functioning in negative way.   She was compliant to home exercise program.     Response to previous treatment: implementing strategies to facilitate attention     Pain Scale: no pain indicated throughout session  Objective:   TIMED  Procedure Min.   Cognitive Therapeutic Interventions, first 15 minutes CPT 69386  15   Cognitive Therapeutic Interventions, each additional 15 minutes CPT 97156  53           Short Term Goals: (6 weeks) Current Progress:   Patient will sustain attention to complete moderate to complex reasoning tasks with one request for clarification to increase sustained attention.      Goal Met 8/9/2023     Auditory: met x3  Visual: met x1 Auditory Sustained Attention  Patient completed auditory sustained attention task in which she was asked to listen to voicemails and answer questions about what she heard. Task completed in distraction-free environment. She listened to 2 voicemails, taking detailed notes and requesting frequent repetition of information she heard. She  answered questions with 100% accuracy and reported 3/7 on relative scale of cognitive load.     Visual Sustained Attention:  Patient completed visual sustained attention task of completing medication management and fulfilling pill box based on frequency/directions on side of each pill bottle. Patient completed task with 100% accuracy with reported 1/7 on relative scale of cognitive load and no reported difficulty.      2. Patient will complete selective attention tasks (auditory or visual) with 90% accuracy independently increase selective attention.     Progressing/ Not Met 8/16/2023    Auditory:   Visual: met x2  Auditory Selective Attention:  Patient completed visual selective attention tasks in today's session in which she was asked to listen to increasingly complex videos (topic and length) and then answer questions about what she heard in distracting environment (door open, notification noises, typing, etc). She completed x2. On initial attempt, she answered questions with 77% accuracy in 10/13 trials and reported 4/7 on relative scale of cognitive load. She only listened to video once, taking minimal notes.     For second video, patient re-watched video as needed and took notes as needed. She answered questions about what she heard with 88% accuracy and rated task as 5/7 on relative scale of cognitive load.     Visual Selective Attention:  Patient completed visual selective attention task requiring her to visually scan for specific letters of the alphabet in order in a scrambled paragraph. She completed x2. On first attempt, she achieved 96% accuracy and reported task as 3/7 on relative scale of cognitive load with minimal cueing for use of strategies to facilitated visual attention such as using finger/pen as guide and stating next letter aloud. On second attempt, she achieved 100% accuracy with reported 3/7 on relative scale of cognitive load and same use of strategies.      3. Patient will use attention  shifting strategies to shift attention between two tasks (auditory or visual) with no more than 3 cues or 90% accuracy to improve alternating attention.      Progressing/ Not Met 8/16/2023   Not formally addressed      4. Patient will complete mental manipulation tasks with 90% acc to improve working memory.        Progressing/ Not Met 8/16/2023   Not formally addressed      5. Patient will use Goal Plan Action Review strategy to complete moderate to complex reasoning, planning, or organization tasks with 90% accuracy independently to improve functional executive function skills.      Progressing/ Not Met 8/16/2023   Not formally addressed      6. Patient will determine a list of possible solutions, given a problem scenario in a structured setting with 90% accuracy across 2 sessions.      Progressing/ Not Met 8/16/2023   Not formally addressed         7. Patient will complete a functional task to improve attention, memory and/or executive functions (I.e. sample bill paying activity, recipe, or multiple choice comprehension questions to 1 paragraphs) with 80% accuracy and natural environment noise distractions (TV news background, music, etc.).     Progressing/ Not Met 8/16/2023   Not formally addressed      8. Patient will be educated regarding cognitive deficits as applicable to the patient's life for increased awareness and will execute returned demonstration of information with 80% accuracy.      Goal Met 7/10/2023   Patient was educated thoroughly regarding a variety of foundational cognitive elements related to mild TBI including the cognitive triangle (with emphasis on foundational versus higher level cognitive functions, awareness of deficits, impact of attention, information processing, memory, and executive functioning deficits as each area relates to assessment findings), spoon theory to discuss cognitive versus physical versus emotional fatigue and management of each to more efficiently use energy daily  for more or less cognitively demanding tasks, internal versus external distractions and management of each, as well as overall impact of these deficits on daily functioning. Patient demonstrated awareness of information provided as demonstrated by asking good questions and expressing understanding.      9. Patient will utilize various strategies such as efficient visual scanning and organization to facilitate completion of a task in a dynamic environment (various sensory stimuli) with minimal assistance for use of strategies.      Progressing/ Not Met 8/16/2023    Not formally addressed              Patient Education/Response:   Patient educated regarding the following:  Patient was educated thoroughly regarding a variety of foundational cognitive elements related to mild TBI including the cognitive triangle (with emphasis on foundational versus higher level cognitive functions, awareness of deficits, impact of attention, information processing, memory, and executive functioning deficits as each area relates to assessment findings), spoon theory to discuss cognitive versus physical versus emotional fatigue and management of each to more efficiently use energy daily for more or less cognitively demanding tasks, internal versus external distractions and management of each, as well as overall impact of these deficits on daily functioning. Patient demonstrated awareness of information provided as demonstrated by asking good questions and expressing understanding.    Home program established: yes-management of energy/fatigue; being mindful of distractions in the environment   Patient verbalized understanding to all above education provided.     See Electronic Medical Record under Patient Instructions for exercises provided throughout therapy.  Assessment:   Patient was engaged throughout extensive discussion regarding cognitive fatigue and energy levels throughout the day with reference to fatigue scale, spoon theory,  taking cognitive rest breaks, and awareness of distractions in the environment. Progress toward selective attention goals in today's session, visual > auditory with discussion of use of strategies across tasks. Will continue to progress per patient tolerance.      Kasie is progressing well towards her goals. Current goals remain appropriate. Goals to be updated as necessary.     Patient prognosis is Good. Patient will continue to benefit from skilled outpatient speech and language therapy to address the deficits listed in the problem list on initial evaluation, provide patient/family education and to maximize patient's level of independence in the home and community environment.     Medical necessity is demonstrated by the following IMPAIRMENTS:  Cognition: Deficits in executive functioning, attention, and memory prevent the pt from relaying medically and safety relevant information in a timely manner in a state of emergency.     Barriers to Therapy: NA  Patient's spiritual, cultural and educational needs considered and patient agreeable to plan of care and goals.  Plan:   Continue Plan of Care with focus on rehabilitation and compensation for mild to moderate cognitive impairment.     Rhianna Perez MA, CCC-SLP  Speech Language Pathologist  8/16/2023

## 2023-08-16 NOTE — PROGRESS NOTES
[facility]   Physical Therapy Treatment Note        Name: Kasie Quevedo  Clinic Number: [id number]    Therapy Diagnosis:   Encounter Diagnoses   Name Primary?    Peripheral vestibulopathy, unspecified laterality Yes    Dizziness     Abnormality of gait and mobility      Physician: Jose Ramon Roland MD    Visit Date: 8/16/2023    Physician Orders: PT Eval and Treat  Medical Diagnosis from Referral: peripheral vestibulopathy   Evaluation Date: 6/15/2023  Authorization Period Expiration: 6/12/2024  Plan of Care Expiration: 9/14/2023  Visit # / Visits authorized: 5/20 (+eval)  FOTO: 1/3     Progress Note Due on 8/19/2023     Precautions: Standard    PTA Visit #: 0/5     Time In: 1135  Time Out: 1215  Total Billable Time: 40 minutes (Billing reflects 1 on 1 treatment time spent with patient)    Subjective     Patient reports: that all this week she has been feeling off. She feels off balance, and has been stumbling a little. Patient reports that she has also been waking up with headaches more frequently. She went to her eye doctor, and she reports that she was told she has increased pressure, so she has to follow up with him again next month.     He/She was compliant with home exercise program.  Response to previous treatment: no adverse reactions  Functional change: pt does report slight improvements in turning her head since performing her exercises    Pain: 6-7/10     Location: headache, neck, shoulder, back, radicular L UE s/s    Objective      Objective Measures updated at progress report or POC update only unless otherwise noted.     Brian-Hallpike: Negative Bilaterally  Horizontal Canal Testing: Positive Bilaterally, stronger s/s to the right    Treatment     Kasie received the treatments listed below:     NEUROMUSCULAR RE-EDUCATION ACTIVITIES to improve Balance, Coordination, Kinesthetic, Sense, Proprioception, and Posture for (40) minutes.  The following were  "included:    Intervention Performed Today    Smooth Pursuits - standing x 30" vertical and 30" horizontal   Difficulty tracking smoothly, minimal nystagmus noted, increases s/s mildly, moderate postural sway    Saccades  30" vertical and horizontal   Difficulty keeping pace but improved.   Colored sticky notes with letters on it, calling out letters and colors in different orders, 3 x 30" (last set on airex)   VOR x 1  30" vertical and horizontal  Moderate difficulty keeping focus, unable to keep pace with head turns   Convergence - pen pushes - standing x 10x   Abnormal convergence, pt still sways backwards and requires one UE support   Visual motion sensitivity  x 2 x 5 each, vertical and horizontal    DLS on airex (added)  2 x 30"   Tandem walking on airex (added)  Down and back 4x   Standing on airex beach with rotations (added)  3 x each side   VCR   3x each side   Positional Vertigo Assessment x As above in objective section   BBQ Roll (R side) x 1x, including patient education for post maneuver instructions which include avoiding laying on R side or completely flat for the next 24 hours, as well as avoiding extreme head movements (looking up to wash hair, down to wash dishes, etc) over the next 24 hours. Patient expressed understanding. Education on inner ear anatomy and how there are different canals that would be affected.        Progress Note  Re-assessment as above in objective section      Plan for Next Visit: progress to standing vestib ex if possible, cone convergence        Patient Education and Home Exercises       Home Exercises Provided and Patient Education Provided     Education provided:   PURPOSE: Patient educated on the impairments noted above and the effects of physical therapy intervention to improve overall condition and QOL.   EXERCISE: Patient was educated on all the above exercise prior/during/after for proper posture, positioning, and execution for safe performance with home exercise " program.   POSTURE: Patient educated on postural awareness to reduce stress and maintain optimal alignment of the spine with static positions and dynamic movement     Written Home Exercises Provided: yes.  Exercises were reviewed and Kasie was able to demonstrate them prior to the end of the session.  Kasie demonstrated good  understanding of the education provided. See EMR under Patient Instructions for exercises provided during therapy sessions.    Assessment     Patient tolerated treatment well today. Positional vertigo testing assessed today. Negative findings with bilateral Burr-Hallpike, but positive findings with bilateral Horizontal Canal Testing. Right side more symptomatic, which led to treatment of right side this visit using BBQ roll. Patient did well with maneuver. She did not report any change in s/s following, but she states that she typically has to be up and walking to notice s/s. Patient reported feeling the same while ambulating leaving the clinic, but improved gait with less veering noted by PT as compared to her gait upon arrival. Patient would benefit from re-assessment next visit since testing today revealed positive testing bilaterally.     Kasie is progressing well towards her goals.   Patient prognosis is Guarded.     Patient will continue to benefit from skilled outpatient physical therapy to address the deficits listed in the problem list box on initial evaluation, provide pt/family education and to maximize patient's level of independence in the home and community environment.     Patient's spiritual, cultural and educational needs considered and pt agreeable to plan of care and goals.     Anticipated Barriers for therapy: co-morbidities, chronicity of condition, lack of understanding of condition, adherence to treatment plan, and transportation     GOALS:     Short Term Goals:  6 weeks Progress   Dizziness: Patient will report decreased dizziness, and recent s/s trend is improving in  order to progress towards LTG's. PC   Function: Patient will demonstrate improved function as indicated by a functional status score of less than or equal to 54 out of 100 on FOTO. PC   Gait: Patient will demonstrate improved gait mechanics in order to improve functional mobility, improve quality of life, and decrease risk of further injury or fall.  PC   HEP: Patient will demonstrate independence with HEP in order to progress toward functional independence. Met  7/21/2023   Improve postural awareness.  PC         Long Term Goals:  12 weeks Progress   Dizziness:  Patient will return to normal ADL, recreational, and work related activities with less dizziness and limitation.  PC   Function: Patient will demonstrate improved function as indicated by a functional status score of less than or equal to 50 out of 100 on FOTO. PC   Gait: Patient will demonstrate normalized gait mechanics with minimal compensation in order to return to PLOF. PC   Decrease saccades as well as positional and VOR related s/s in order for patient to be able to switch positions or look quickly to different directions without onset of dizziness.  PC      Goals Key:  PC= progressing/continue;        PM= partially met;             DC= discontinue    Plan     Continue Plan of Care (POC) and progress per patient tolerance. See treatment section for details on planned progressions next session.    7/21/2023: It is my recommendation that patient continue with PT at her current frequency of 2 times per week for the remainder of her approved visits. Her treatment plan will remain the same, and she will be progressed appropriately.      6/15/2023 (evaluation): Outpatient Physical Therapy 1 times weekly for 12 weeks to include any combination of the following interventions: vestibular habituation exercises, dry needling, modalities, electrical stimulation (IFC, Pre-Mod, Attended with Functional Dry Needling), Cervical/Lumbar Traction, Gait Training, Manual  Therapy, Neuromuscular Re-ed, Patient Education, Self Care, Therapeutic Exercise, and Therapeutic Activites     Tiffany Ryan, PT

## 2023-08-18 ENCOUNTER — CLINICAL SUPPORT (OUTPATIENT)
Dept: REHABILITATION | Facility: HOSPITAL | Age: 53
End: 2023-08-18
Payer: COMMERCIAL

## 2023-08-18 DIAGNOSIS — R41.841 COGNITIVE COMMUNICATION DISORDER: Primary | ICD-10-CM

## 2023-08-18 PROCEDURE — 97130 THER IVNTJ EA ADDL 15 MIN: CPT | Performed by: SPEECH-LANGUAGE PATHOLOGIST

## 2023-08-18 PROCEDURE — 97129 THER IVNTJ 1ST 15 MIN: CPT | Performed by: SPEECH-LANGUAGE PATHOLOGIST

## 2023-08-18 NOTE — PROGRESS NOTES
[facility]  Speech Therapy Treatment Note- Neurological Rehabilitation  Date: 8/18/2023     Name: Kasie Quevedo   MRN: [id number]   Therapy Diagnosis:   Encounter Diagnosis   Name Primary?    Cognitive communication disorder Yes   Physician: Jose Ramon Roland MD  Physician Orders: Ambulatory Referral to Speech Therapy   Medical Diagnosis: Difficulty with speech [R47.9]    Visit # / Visits Authorized:  5 / 20 (+eval)  Date of Evaluation:  7/10/2023   Insurance Authorization Period: 7/21/2023 - 12/31/2023  Plan of Care Certification:    7/10/2023 to 10/03/23   Progress Note: 09/01/2023     Time In:  1:24 PM  Time Out:  2:13 PM  Total Billable Time: 49 minutes     Precautions: Standard and Fall  Subjective:   Patient reports: she is doing okay. She arrived about 10 minutes late. She reports she is attempting to implement strategies to improve attention.  She was compliant to home exercise program.     Response to previous treatment: implementing strategies to facilitate attention     Pain Scale: no pain indicated throughout session  Objective:   TIMED  Procedure Min.   Cognitive Therapeutic Interventions, first 15 minutes CPT 76958  15   Cognitive Therapeutic Interventions, each additional 15 minutes CPT 53199  34           Short Term Goals: (6 weeks) Current Progress:   Patient will sustain attention to complete moderate to complex reasoning tasks with one request for clarification to increase sustained attention.      Goal Met 8/9/2023     Auditory: met x3  Visual: met x1 Auditory Sustained Attention  Patient completed auditory sustained attention task in which she was asked to listen to voicemails and answer questions about what she heard. Task completed in distraction-free environment. She listened to 2 voicemails, taking detailed notes and requesting frequent repetition of information she heard. She answered questions with 100% accuracy and reported 3/7 on relative scale of cognitive load.      Visual Sustained Attention:  Patient completed visual sustained attention task of completing medication management and fulfilling pill box based on frequency/directions on side of each pill bottle. Patient completed task with 100% accuracy with reported 1/7 on relative scale of cognitive load and no reported difficulty.      2. Patient will complete selective attention tasks (auditory or visual) with 90% accuracy independently increase selective attention.     Progressing/ Not Met 8/18/2023    Auditory:   Visual: met x2  Auditory Selective Attention:  Patient completed visual selective attention tasks in today's session in which she was asked to listen to increasingly complex videos (topic and length) and then answer questions about what she heard in distracting environment (door open, notification noises, typing, etc). She completed x1. She answered questions with 77% accuracy in 10/13 trials and reported 4/7 on relative scale of cognitive load. She repeated as needed, taking minimal notes throughout task. Of note, she reported she, during task, had to write down grocery list for later but did not pause video during task, likely contributing to performance. Additionally, errors occurred specifically towards end of video questions.      3. Patient will use attention shifting strategies to shift attention between two tasks (auditory or visual) with no more than 3 cues or 90% accuracy to improve alternating attention.      Progressing/ Not Met 8/18/2023   Not formally addressed      4. Patient will complete mental manipulation tasks with 90% acc to improve working memory.        Progressing/ Not Met 8/18/2023   Not formally addressed      5. Patient will use Goal Plan Action Review strategy to complete moderate to complex reasoning, planning, or organization tasks with 90% accuracy independently to improve functional executive function skills.      Progressing/ Not Met 8/18/2023   Not formally addressed      6.  Patient will determine a list of possible solutions, given a problem scenario in a structured setting with 90% accuracy across 2 sessions.      Progressing/ Not Met 8/18/2023   Not formally addressed         7. Patient will complete a functional task to improve attention, memory and/or executive functions (I.e. sample bill paying activity, recipe, or multiple choice comprehension questions to 1 paragraphs) with 80% accuracy and natural environment noise distractions (TV news background, music, etc.).     Progressing/ Not Met 8/18/2023   Not formally addressed      8. Patient will be educated regarding cognitive deficits as applicable to the patient's life for increased awareness and will execute returned demonstration of information with 80% accuracy.      Goal Met 7/10/2023   Patient was educated thoroughly regarding a variety of foundational cognitive elements related to mild TBI including the cognitive triangle (with emphasis on foundational versus higher level cognitive functions, awareness of deficits, impact of attention, information processing, memory, and executive functioning deficits as each area relates to assessment findings), spoon theory to discuss cognitive versus physical versus emotional fatigue and management of each to more efficiently use energy daily for more or less cognitively demanding tasks, internal versus external distractions and management of each, as well as overall impact of these deficits on daily functioning. Patient demonstrated awareness of information provided as demonstrated by asking good questions and expressing understanding.      9. Patient will utilize various strategies such as efficient visual scanning and organization to facilitate completion of a task in a dynamic environment (various sensory stimuli) with minimal assistance for use of strategies.      Progressing/ Not Met 8/18/2023    Not formally addressed              Patient Education/Response:   Patient educated  regarding the following:  Patient was educated thoroughly regarding a variety of foundational cognitive elements related to mild TBI including the cognitive triangle (with emphasis on foundational versus higher level cognitive functions, awareness of deficits, impact of attention, information processing, memory, and executive functioning deficits as each area relates to assessment findings), spoon theory to discuss cognitive versus physical versus emotional fatigue and management of each to more efficiently use energy daily for more or less cognitively demanding tasks, internal versus external distractions and management of each, as well as overall impact of these deficits on daily functioning. Patient demonstrated awareness of information provided as demonstrated by asking good questions and expressing understanding.    Home program established: yes-management of energy/fatigue; being mindful of distractions in the environment   Patient verbalized understanding to all above education provided.     See Electronic Medical Record under Patient Instructions for exercises provided throughout therapy.  Assessment:   Consistent performance for selective attention task in today's session. Internal and external distractions throughout task completion appeared to negatively impact patient's performance as well as reduced accuracy noted as task progressed. Will continue to monitor and progress per patient tolerance in upcoming sessions.    Kasie is progressing well towards her goals. Current goals remain appropriate. Goals to be updated as necessary.     Patient prognosis is Good. Patient will continue to benefit from skilled outpatient speech and language therapy to address the deficits listed in the problem list on initial evaluation, provide patient/family education and to maximize patient's level of independence in the home and community environment.     Medical necessity is demonstrated by the following  IMPAIRMENTS:  Cognition: Deficits in executive functioning, attention, and memory prevent the pt from relaying medically and safety relevant information in a timely manner in a state of emergency.     Barriers to Therapy: NA  Patient's spiritual, cultural and educational needs considered and patient agreeable to plan of care and goals.  Plan:   Continue Plan of Care with focus on rehabilitation and compensation for mild to moderate cognitive impairment.     Rhianna Perez MA, CCC-SLP  Speech Language Pathologist  8/18/2023

## 2023-08-23 ENCOUNTER — CLINICAL SUPPORT (OUTPATIENT)
Dept: REHABILITATION | Facility: HOSPITAL | Age: 53
End: 2023-08-23
Payer: COMMERCIAL

## 2023-08-23 DIAGNOSIS — H81.90 PERIPHERAL VESTIBULOPATHY, UNSPECIFIED LATERALITY: Primary | ICD-10-CM

## 2023-08-23 DIAGNOSIS — R26.9 ABNORMALITY OF GAIT AND MOBILITY: ICD-10-CM

## 2023-08-23 DIAGNOSIS — R42 DIZZINESS: ICD-10-CM

## 2023-08-23 DIAGNOSIS — R41.841 COGNITIVE COMMUNICATION DISORDER: Primary | ICD-10-CM

## 2023-08-23 PROCEDURE — 97112 NEUROMUSCULAR REEDUCATION: CPT | Performed by: PHYSICAL THERAPIST

## 2023-08-23 PROCEDURE — 97130 THER IVNTJ EA ADDL 15 MIN: CPT | Performed by: SPEECH-LANGUAGE PATHOLOGIST

## 2023-08-23 PROCEDURE — 97129 THER IVNTJ 1ST 15 MIN: CPT | Performed by: SPEECH-LANGUAGE PATHOLOGIST

## 2023-08-23 NOTE — PROGRESS NOTES
"[facility]  Speech Therapy Treatment Note- Neurological Rehabilitation  Date: 8/23/2023     Name: Kasie Quevedo   MRN: [id number]   Therapy Diagnosis:   Encounter Diagnosis   Name Primary?    Cognitive communication disorder Yes   Physician: Jose Ramon Roland MD  Physician Orders: Ambulatory Referral to Speech Therapy   Medical Diagnosis: Difficulty with speech [R47.9]    Visit # / Visits Authorized:  6 / 20 (+eval)  Date of Evaluation:  7/10/2023   Insurance Authorization Period: 7/21/2023 - 12/31/2023  Plan of Care Certification:    7/10/2023 to 10/03/23   Progress Note: 09/01/2023     Time In:  1:16 PM  Time Out:  2:10 PM  Total Billable Time: 54 minutes     Precautions: Standard and Fall  Subjective:   Patient reports: she is doing okay. She reports cognitively, things are going "okay". She did not notice any major mistakes or difficulty with focusing this week.   She was compliant to home exercise program.     Response to previous treatment: implementing strategies to facilitate attention     Pain Scale: no pain indicated throughout session  Objective:   TIMED  Procedure Min.   Cognitive Therapeutic Interventions, first 15 minutes CPT 81938  15   Cognitive Therapeutic Interventions, each additional 15 minutes CPT 44190  39           Short Term Goals: (6 weeks) Current Progress:   Patient will sustain attention to complete moderate to complex reasoning tasks with one request for clarification to increase sustained attention.      Goal Met 8/9/2023     Auditory: met x3  Visual: met x1 Auditory Sustained Attention  Patient completed auditory sustained attention task in which she was asked to listen to voicemails and answer questions about what she heard. Task completed in distraction-free environment. She listened to 2 voicemails, taking detailed notes and requesting frequent repetition of information she heard. She answered questions with 100% accuracy and reported 3/7 on relative scale of " "cognitive load.     Visual Sustained Attention:  Patient completed visual sustained attention task of completing medication management and fulfilling pill box based on frequency/directions on side of each pill bottle. Patient completed task with 100% accuracy with reported 1/7 on relative scale of cognitive load and no reported difficulty.      2. Patient will complete selective attention tasks (auditory or visual) with 90% accuracy independently increase selective attention.     Progressing/ Not Met 8/23/2023    Auditory:   Visual: met x2  Auditory Selective Attention:  Not formally addressed      Visual Selective Attention:  Patient completed visual selective attention task in which she was given a menu and asked several questions requiring her to scan menus, locate relevant information, and then complete calculation or problem solving to answer questions. She answered questions/calculations with 80% accuracy in 16/20 trials and minimal verbal cueing for navigation of menu and use of strategies to facilitate accuracy such as requesting repetition of questions to ensure she understood correctly. She demonstrated several instances of "reading into" questions without clarifying and therefore ending up with answer far off from goal. Will continue to progress in upcoming sessions, with more consistent use of strategies to facilitate accuracy.      3. Patient will use attention shifting strategies to shift attention between two tasks (auditory or visual) with no more than 3 cues or 90% accuracy to improve alternating attention.      Progressing/ Not Met 8/23/2023   Not formally addressed      4. Patient will complete mental manipulation tasks with 90% acc to improve working memory.        Progressing/ Not Met 8/23/2023   Not formally addressed      5. Patient will use Goal Plan Action Review strategy to complete moderate to complex reasoning, planning, or organization tasks with 90% accuracy independently to improve " functional executive function skills.      Progressing/ Not Met 8/23/2023   Not formally addressed      6. Patient will determine a list of possible solutions, given a problem scenario in a structured setting with 90% accuracy across 2 sessions.      Progressing/ Not Met 8/23/2023   Not formally addressed         7. Patient will complete a functional task to improve attention, memory and/or executive functions (I.e. sample bill paying activity, recipe, or multiple choice comprehension questions to 1 paragraphs) with 80% accuracy and natural environment noise distractions (TV news background, music, etc.).     Progressing/ Not Met 8/23/2023   Not formally addressed      8. Patient will be educated regarding cognitive deficits as applicable to the patient's life for increased awareness and will execute returned demonstration of information with 80% accuracy.      Goal Met 7/10/2023   Patient was educated thoroughly regarding a variety of foundational cognitive elements related to mild TBI including the cognitive triangle (with emphasis on foundational versus higher level cognitive functions, awareness of deficits, impact of attention, information processing, memory, and executive functioning deficits as each area relates to assessment findings), spoon theory to discuss cognitive versus physical versus emotional fatigue and management of each to more efficiently use energy daily for more or less cognitively demanding tasks, internal versus external distractions and management of each, as well as overall impact of these deficits on daily functioning. Patient demonstrated awareness of information provided as demonstrated by asking good questions and expressing understanding.      9. Patient will utilize various strategies such as efficient visual scanning and organization to facilitate completion of a task in a dynamic environment (various sensory stimuli) with minimal assistance for use of strategies.      Progressing/  "Not Met 8/23/2023    Not formally addressed              Patient Education/Response:   Patient educated regarding the following:  Patient was educated thoroughly regarding a variety of foundational cognitive elements related to mild TBI including the cognitive triangle (with emphasis on foundational versus higher level cognitive functions, awareness of deficits, impact of attention, information processing, memory, and executive functioning deficits as each area relates to assessment findings), spoon theory to discuss cognitive versus physical versus emotional fatigue and management of each to more efficiently use energy daily for more or less cognitively demanding tasks, internal versus external distractions and management of each, as well as overall impact of these deficits on daily functioning. Patient demonstrated awareness of information provided as demonstrated by asking good questions and expressing understanding.    Home program established: yes-management of energy/fatigue; being mindful of distractions in the environment   Patient verbalized understanding to all above education provided.     See Electronic Medical Record under Patient Instructions for exercises provided throughout therapy.  Assessment:   Some progress for visual selective attention task in today's session. Patient required minimal verbal cueing for navigation of menu and use of strategies to facilitate accuracy such as requesting repetition of questions to ensure she understood correctly. She demonstrated several instances of "reading into" questions without clarifying and therefore ending up with answer far off from goal. Will continue to progress in upcoming sessions, with more consistent use of strategies to facilitate accuracy.     Kasie is progressing well towards her goals. Current goals remain appropriate. Goals to be updated as necessary.     Patient prognosis is Good. Patient will continue to benefit from skilled outpatient speech " and language therapy to address the deficits listed in the problem list on initial evaluation, provide patient/family education and to maximize patient's level of independence in the home and community environment.     Medical necessity is demonstrated by the following IMPAIRMENTS:  Cognition: Deficits in executive functioning, attention, and memory prevent the pt from relaying medically and safety relevant information in a timely manner in a state of emergency.     Barriers to Therapy: NA  Patient's spiritual, cultural and educational needs considered and patient agreeable to plan of care and goals.  Plan:   Continue Plan of Care with focus on rehabilitation and compensation for mild to moderate cognitive impairment.     Rhianna Perez MA, CCC-SLP  Speech Language Pathologist  8/23/2023

## 2023-08-24 NOTE — PLAN OF CARE
[facility]   Physical Therapy Treatment Note + Progress Note       Name: Kasie Quevedo  Clinic Number: [id number]    Therapy Diagnosis:   Encounter Diagnoses   Name Primary?    Peripheral vestibulopathy, unspecified laterality Yes    Dizziness     Abnormality of gait and mobility      Physician: Jose Ramon Roland MD    Visit Date: 8/23/2023    Physician Orders: PT Eval and Treat  Medical Diagnosis from Referral: peripheral vestibulopathy   Evaluation Date: 6/15/2023  Authorization Period Expiration: 6/12/2024  Plan of Care Expiration: 9/14/2023  Visit # / Visits authorized: 6/20 (+eval)  FOTO: 1/3     Progress Note Due on 9/14/2023     Precautions: Standard    PTA Visit #: 0/5     Time In: 1415  Time Out: 1516  Total Billable Time: 45 minutes (Billing reflects 1 on 1 treatment time spent with patient)    Subjective     Patient reports: that she feels she has made some overall progress functionally. Her dizziness is better than when she started. For example, she reports that she was not even able to tolerate riding in a car, but now she can tolerate driving small distances. Her headaches are less since she lost hearing in her left ear, but they still occur frequently. Patient reports that her s/s come and go, reporting good days and bad days over the past couple of weeks. She did not notice any improvement after the BBQ roll last visit and is feeling about the same overall. Patient is not sure if it is the heat or something else, but her symptoms have seemed to worsen a little over the past week of two.     He/She was compliant with home exercise program.  Response to previous treatment: no adverse reactions  Functional change: pt does report slight improvements in turning her head since performing her exercises    Pain: 7-8/10     Location: headache, neck, shoulder, back, radicular L UE s/s    Objective      Objective Measures updated at progress report or POC update only unless  otherwise noted.     Posture: Pt presents with postural abnormalities which include: forward head and rounded shoulders                        Visual/Auditory:   Tracking/Smooth Pursuits:Impaired: improved tracking, minimal nystagmus noted, increases s/s mildly, sees double today with vertical tracking (improved with slowing jennifer)  Gaze Stabilization: Negative   Head Shake: Positive  Head Thrust: Positive - Left  Saccades: Impaired: Difficulty gazing from target to target, does not feel like target gets closer today, unable to gaze smoothly or keep pace, corrective saccades noted, but less than last visit  Convergence: impaired, 39 cm (20cm - initial)  VOR: Impaired: Extreme difficulty keeping focus, unable to keep pace with head turns  VCR: Impaired: increases s/s, loses balance (head remains still, body moves).   Also tested seated - unable to return to a neutral head position with eyes closed         POSITIONAL CANAL TESTING -   Looking for nystagmus (slow drift to affected side with quick correction away)     Trivoli Hallpike (posterior / CL anterior)              Right : NT              Left: NT  Horizontal Canals              Right: NT              Left: NT        Modified VAS (Vertebral Artery Screen), in sitting (rotation, then extension):  R: Negative   L: Negative     Cervical ROM Screen:  Decreased cervical range of motion all directions, but especially left rotation and left side bending. Decreased subcranial range of motion, especially left rotation. Subcranial mobility also increases neural tension into low back and L UE, but Open End Feel with L subcranial rotation.     SPECIAL TESTS:  Sharp Lucila: Negative  Alar Ligament Test: Negative     MUSCLE LENGTH:      Muscle Tested  Right Left  Goal   Upper Trapezius  decreased decreased Normal B    Levator Scapulae  decreased decreased Normal B   Sternocleidomastoid decreased decreased Normal B   Scalenes  decreased decreased Normal B    Pectoralis Minor   "decreased decreased Normal B         Gait Analysis: The pt presents with the following gait abnormalities: bradykinetic, increased SHAWNA, decreased step length bilateral, and decreased hip extension bilateral  8/23/2023: Patient presents with slight improvement in jennifer and step length. She still presents with increased SHAWNA when her s/s are worse, and she still ambulates with decreased hip extension bilaterally.         Function:    Treatment     Kasie received the treatments listed below:     NEUROMUSCULAR RE-EDUCATION ACTIVITIES to improve Balance, Coordination, Kinesthetic, Sense, Proprioception, and Posture for (45) minutes.  The following were included:    Intervention Performed Today    Smooth Pursuits - standing  30" vertical and 30" horizontal   Difficulty tracking smoothly, minimal nystagmus noted, increases s/s mildly, moderate postural sway    Saccades  30" vertical and horizontal   Difficulty keeping pace but improved.   Colored sticky notes with letters on it, calling out letters and colors in different orders, 3 x 30" (last set on airex)   VOR x 1  30" vertical and horizontal  Moderate difficulty keeping focus, unable to keep pace with head turns   Convergence - pen pushes - standing x 10x   Abnormal convergence, pt still sways backwards and requires one UE support   Visual motion sensitivity  x 2 x 5 each, vertical and horizontal    DLS on airex (added)  2 x 30"   Tandem walking on airex (added)  Down and back 4x   Standing on airex beach with rotations (added)  3 x each side   VCR   3x each side   Positional Vertigo Assessment  As above in objective section   BBQ Roll (R side)  1x, including patient education for post maneuver instructions which include avoiding laying on R side or completely flat for the next 24 hours, as well as avoiding extreme head movements (looking up to wash hair, down to wash dishes, etc) over the next 24 hours. Patient expressed understanding. Education on inner ear anatomy " "and how there are different canals that would be affected.        Progress Note x Re-assessment as above in objective section   VOMs - approximately 20" performed with each during assessment     Plan for Next Visit: progress to standing vestib ex if possible, cone convergence        Patient Education and Home Exercises       Home Exercises Provided and Patient Education Provided     Education provided:   PURPOSE: Patient educated on the impairments noted above and the effects of physical therapy intervention to improve overall condition and QOL.   EXERCISE: Patient was educated on all the above exercise prior/during/after for proper posture, positioning, and execution for safe performance with home exercise program.   POSTURE: Patient educated on postural awareness to reduce stress and maintain optimal alignment of the spine with static positions and dynamic movement     Written Home Exercises Provided: yes.  Exercises were reviewed and Kasie was able to demonstrate them prior to the end of the session.  Kasie demonstrated good  understanding of the education provided. See EMR under Patient Instructions for exercises provided during therapy sessions.    Assessment     Patient tolerated treatment well and has attended 7 total PT treatment sessions. Patient does demonstrate improvements in VOMS today as compared to initial evaluation; however, her s/s have seemed to begin to plateau in the past few visits. Patient has noticed a slight regression in s/s over the past couple of weeks and is not sure why. Assessed cervical and subcranial ROM this visit, which showed limitations as stated above in objective section. Patient may benefit from adding manual therapy and and therapeutic exercises to address limitations in cervical and subcranial region, as this may also be playing apart in dizziness s/s. Will see how patient responds in upcoming visits. However, if s/s do not improve, patient will be referred back to MD for " further evaluation and treatment options.     Kasie is progressing well towards her goals.   Patient prognosis is Guarded.     Patient will continue to benefit from skilled outpatient physical therapy to address the deficits listed in the problem list box on initial evaluation, provide pt/family education and to maximize patient's level of independence in the home and community environment.     Patient's spiritual, cultural and educational needs considered and pt agreeable to plan of care and goals.     Anticipated Barriers for therapy: co-morbidities, chronicity of condition, lack of understanding of condition, adherence to treatment plan, and transportation     GOALS:     Short Term Goals:  6 weeks Progress   Dizziness: Patient will report decreased dizziness, and recent s/s trend is improving in order to progress towards LTG's. PC   Function: Patient will demonstrate improved function as indicated by a functional status score of less than or equal to 54 out of 100 on FOTO. PC   Gait: Patient will demonstrate improved gait mechanics in order to improve functional mobility, improve quality of life, and decrease risk of further injury or fall.  Met  8/23/2023   HEP: Patient will demonstrate independence with HEP in order to progress toward functional independence. Met  7/21/2023   Improve postural awareness.  Met  8/23/2023         Long Term Goals:  12 weeks Progress   Dizziness:  Patient will return to normal ADL, recreational, and work related activities with less dizziness and limitation.  PC   Function: Patient will demonstrate improved function as indicated by a functional status score of less than or equal to 50 out of 100 on FOTO. PC   Gait: Patient will demonstrate normalized gait mechanics with minimal compensation in order to return to PLOF. PC   Decrease saccades as well as positional and VOR related s/s in order for patient to be able to switch positions or look quickly to different directions without  onset of dizziness.  PC      Goals Key:  PC= progressing/continue;        PM= partially met;             DC= discontinue    Plan     Continue Plan of Care (POC) and progress per patient tolerance. See treatment section for details on planned progressions next session.    8/23/2023: It is my recommendation that patient continue with PT at her current frequency of 2 times per week for the remainder of her approved visits. Her treatment plan will remain the same, and she will be progressed appropriately. If s/s do not improve, she will be referred back to MD for further assessment and treatment options.     7/21/2023: It is my recommendation that patient continue with PT at her current frequency of 2 times per week for the remainder of her approved visits. Her treatment plan will remain the same, and she will be progressed appropriately.      6/15/2023 (evaluation): Outpatient Physical Therapy 1 times weekly for 12 weeks to include any combination of the following interventions: vestibular habituation exercises, dry needling, modalities, electrical stimulation (IFC, Pre-Mod, Attended with Functional Dry Needling), Cervical/Lumbar Traction, Gait Training, Manual Therapy, Neuromuscular Re-ed, Patient Education, Self Care, Therapeutic Exercise, and Therapeutic Activites     Tiffany Ryan, PT

## 2023-08-30 ENCOUNTER — CLINICAL SUPPORT (OUTPATIENT)
Dept: REHABILITATION | Facility: HOSPITAL | Age: 53
End: 2023-08-30
Payer: COMMERCIAL

## 2023-08-30 DIAGNOSIS — H81.90 PERIPHERAL VESTIBULOPATHY, UNSPECIFIED LATERALITY: Primary | ICD-10-CM

## 2023-08-30 DIAGNOSIS — R26.9 ABNORMALITY OF GAIT AND MOBILITY: ICD-10-CM

## 2023-08-30 DIAGNOSIS — R41.841 COGNITIVE COMMUNICATION DISORDER: Primary | ICD-10-CM

## 2023-08-30 DIAGNOSIS — R42 DIZZINESS: ICD-10-CM

## 2023-08-30 PROCEDURE — 97140 MANUAL THERAPY 1/> REGIONS: CPT | Performed by: PHYSICAL THERAPIST

## 2023-08-30 PROCEDURE — 97129 THER IVNTJ 1ST 15 MIN: CPT | Performed by: SPEECH-LANGUAGE PATHOLOGIST

## 2023-08-30 PROCEDURE — 97130 THER IVNTJ EA ADDL 15 MIN: CPT | Performed by: SPEECH-LANGUAGE PATHOLOGIST

## 2023-08-30 PROCEDURE — 97112 NEUROMUSCULAR REEDUCATION: CPT | Performed by: PHYSICAL THERAPIST

## 2023-08-30 NOTE — PROGRESS NOTES
"[facility]  Speech Therapy Treatment Note- Neurological Rehabilitation  Date: 8/30/2023     Name: Kasie Quevedo   MRN: [id number]   Therapy Diagnosis:   Encounter Diagnosis   Name Primary?    Cognitive communication disorder Yes   Physician: Jose Ramon Roland MD  Physician Orders: Ambulatory Referral to Speech Therapy   Medical Diagnosis: Difficulty with speech [R47.9]    Visit # / Visits Authorized:  7 / 20 (+eval)  Date of Evaluation:  7/10/2023   Insurance Authorization Period: 7/21/2023 - 12/31/2023  Plan of Care Certification:    7/10/2023 to 10/03/23   Progress Note: 09/01/2023     Time In:  1:16 PM  Time Out:  2:13 PM  Total Billable Time: 57 minutes     Precautions: Standard and Fall  Subjective:   Patient reports: she is doing okay. She reports cognitively, things are going "okay". She has been dealing with impact of her mom falling last week. She reports some distraction with frequent phone calls, staying at hospital and now at her mom's house this week, being out of normal routine, etc.   She was compliant to home exercise program.     Response to previous treatment: attempting to implement strategies to facilitate attention     Pain Scale: no pain indicated throughout session  Objective:   TIMED  Procedure Min.   Cognitive Therapeutic Interventions, first 15 minutes CPT 39125  15   Cognitive Therapeutic Interventions, each additional 15 minutes CPT 62865  42           Short Term Goals: (6 weeks) Current Progress:   Patient will sustain attention to complete moderate to complex reasoning tasks with one request for clarification to increase sustained attention.      Goal Met 8/9/2023     Auditory: met x3  Visual: met x1 Auditory Sustained Attention  Patient completed auditory sustained attention task in which she was asked to listen to voicemails and answer questions about what she heard. Task completed in distraction-free environment. She listened to 2 voicemails, taking detailed " notes and requesting frequent repetition of information she heard. She answered questions with 100% accuracy and reported 3/7 on relative scale of cognitive load.     Visual Sustained Attention:  Patient completed visual sustained attention task of completing medication management and fulfilling pill box based on frequency/directions on side of each pill bottle. Patient completed task with 100% accuracy with reported 1/7 on relative scale of cognitive load and no reported difficulty.      2. Patient will complete selective attention tasks (auditory or visual) with 90% accuracy independently increase selective attention.     Progressing/ Not Met 8/30/2023    Auditory:   Visual: met x3 Auditory Selective Attention:  Not formally addressed      Visual Selective Attention:  Patient completed visual selective attention task in which she was given a menu and asked several questions requiring her to scan menus, locate relevant information, and then complete calculation or problem solving to answer questions. She answered questions/calculations with 93% accuracy in 12/13 trials and minimal verbal cueing for navigation of menu and use of strategies to facilitate accuracy such as requesting repetition of questions to ensure she understood correctly. She demonstrated overall great improvement in use of strategies compared to previous, with requesting help/asking questions to facilitate accuracy as needed.      3. Patient will use attention shifting strategies to shift attention between two tasks (auditory or visual) with no more than 3 cues or 90% accuracy to improve alternating attention.      Progressing/ Not Met 8/30/2023   Not formally addressed      4. Patient will complete mental manipulation tasks with 90% acc to improve working memory.        Progressing/ Not Met 8/30/2023   Not formally addressed      5. Patient will use Goal Plan Action Review strategy to complete moderate to complex reasoning, planning, or  organization tasks with 90% accuracy independently to improve functional executive function skills.      Progressing/ Not Met 8/30/2023   Not formally addressed      6. Patient will determine a list of possible solutions, given a problem scenario in a structured setting with 90% accuracy across 2 sessions.      Progressing/ Not Met 8/30/2023   Not formally addressed         7. Patient will complete a functional task to improve attention, memory and/or executive functions (I.e. sample bill paying activity, recipe, or multiple choice comprehension questions to 1 paragraphs) with 80% accuracy and natural environment noise distractions (TV news background, music, etc.).     Progressing/ Not Met 8/30/2023   Not formally addressed      8. Patient will be educated regarding cognitive deficits as applicable to the patient's life for increased awareness and will execute returned demonstration of information with 80% accuracy.      Goal Met 7/10/2023   Patient was educated thoroughly regarding a variety of foundational cognitive elements related to mild TBI including the cognitive triangle (with emphasis on foundational versus higher level cognitive functions, awareness of deficits, impact of attention, information processing, memory, and executive functioning deficits as each area relates to assessment findings), spoon theory to discuss cognitive versus physical versus emotional fatigue and management of each to more efficiently use energy daily for more or less cognitively demanding tasks, internal versus external distractions and management of each, as well as overall impact of these deficits on daily functioning. Patient demonstrated awareness of information provided as demonstrated by asking good questions and expressing understanding.      9. Patient will utilize various strategies such as efficient visual scanning and organization to facilitate completion of a task in a dynamic environment (various sensory stimuli)  with minimal assistance for use of strategies.      Progressing/ Not Met 8/30/2023    Not formally addressed              Patient Education/Response:   Patient educated regarding the following:  Patient was educated thoroughly regarding a variety of foundational cognitive elements related to mild TBI including the cognitive triangle (with emphasis on foundational versus higher level cognitive functions, awareness of deficits, impact of attention, information processing, memory, and executive functioning deficits as each area relates to assessment findings), spoon theory to discuss cognitive versus physical versus emotional fatigue and management of each to more efficiently use energy daily for more or less cognitively demanding tasks, internal versus external distractions and management of each, as well as overall impact of these deficits on daily functioning. Patient demonstrated awareness of information provided as demonstrated by asking good questions and expressing understanding.    Home program established: yes-management of energy/fatigue; being mindful of distractions in the environment   Patient verbalized understanding to all above education provided.     See Electronic Medical Record under Patient Instructions for exercises provided throughout therapy.  Assessment:   Patient with overall progress toward use of strategies for accurate execution of visual selective attention task in today's session compared to previous. She used strategies more effectively in today's session to improve cognitive load and overall accurate execution. Will continue to progress per patient tolerance.     Kasie is progressing well towards her goals. Current goals remain appropriate. Goals to be updated as necessary.     Patient prognosis is Good. Patient will continue to benefit from skilled outpatient speech and language therapy to address the deficits listed in the problem list on initial evaluation, provide patient/family  education and to maximize patient's level of independence in the home and community environment.     Medical necessity is demonstrated by the following IMPAIRMENTS:  Cognition: Deficits in executive functioning, attention, and memory prevent the pt from relaying medically and safety relevant information in a timely manner in a state of emergency.     Barriers to Therapy: NA  Patient's spiritual, cultural and educational needs considered and patient agreeable to plan of care and goals.  Plan:   Continue Plan of Care with focus on rehabilitation and compensation for mild to moderate cognitive impairment.     Rhianna Perez MA, CCC-SLP  Speech Language Pathologist  8/30/2023

## 2023-08-30 NOTE — PROGRESS NOTES
[facility]   Physical Therapy Treatment Note       Name: Kasie Quevedo  Clinic Number: [id number]    Therapy Diagnosis:   Encounter Diagnoses   Name Primary?    Peripheral vestibulopathy, unspecified laterality Yes    Dizziness     Abnormality of gait and mobility      Physician: Jose Ramon Roland MD    Visit Date: 8/30/2023    Physician Orders: PT Eval and Treat  Medical Diagnosis from Referral: peripheral vestibulopathy   Evaluation Date: 6/15/2023  Authorization Period Expiration: 6/12/2024  Plan of Care Expiration: 9/14/2023  Visit # / Visits authorized: 7/20 (+eval)  FOTO: 1/3     Progress Note Due on 9/14/2023     Precautions: Standard    PTA Visit #: 0/5     Time In: 1438  Time Out: 1520  Total Billable Time: 40 minutes (Billing reflects 1 on 1 treatment time spent with patient)    Subjective     Patient reports: that her back and hand are still painful, but her dizziness has actually been a little better over the past few days.     He/She was compliant with home exercise program.  Response to previous treatment: no adverse reactions  Functional change: pt does report slight improvements in turning her head since performing her exercises    Pain: 7-8/10     Location: headache, neck, shoulder, back, radicular L UE s/s    Objective      Objective Measures updated at progress report or POC update only unless otherwise noted.       Treatment     Kasie received the treatments listed below:       MANUAL THERAPY TECHNIQUES were applied for (12) minutes, including:    Manual Intervention Performed Today    Soft Tissue Mobilization [x] Very gentle bilateral suboccipitals, paraspinals, upper trapezius, levator scapulae     Joint Mobilizations []    Subcranial PROM [x] Flex/extension, side bending, and rotation (gentle, small tolerable range)    []    Functional Dry Needling  []      Plan for Next Visit: Continue as needed         NEUROMUSCULAR RE-EDUCATION ACTIVITIES to improve Balance,  "Coordination, Kinesthetic, Sense, Proprioception, and Posture for (28) minutes.  The following were included:    Intervention Performed Today    Smooth Pursuits - standing  30" vertical and 30" horizontal   Difficulty tracking smoothly, minimal nystagmus noted, increases s/s mildly, moderate postural sway    Saccades  30" vertical and horizontal   Difficulty keeping pace but improved.   Colored sticky notes with letters on it, calling out letters and colors in different orders, 3 x 30" (last set on airex)   VOR x 1  30" vertical and horizontal  Moderate difficulty keeping focus, unable to keep pace with head turns   Convergence - pen pushes - standing  10x   Abnormal convergence, pt still sways backwards and requires one UE support   Visual motion sensitivity  x 2 x 5 each, vertical and horizontal    DLS on airex x 2 x 30"   Tandem walking on airex   Down and back 4x   Standing on airex beam with rotations x 3 x each side   VCR  x 5x each side   Chin tucks - supine (added) x 10x   Cervical ROM - supine (added) x All planes, 10x each   Progress Note  Re-assessment as above in objective section   VOMs - approximately 20" performed with each during assessment     Plan for Next Visit: progress to standing vestib ex if possible, cone convergence        Patient Education and Home Exercises       Home Exercises Provided and Patient Education Provided     Education provided:   PURPOSE: Patient educated on the impairments noted above and the effects of physical therapy intervention to improve overall condition and QOL.   EXERCISE: Patient was educated on all the above exercise prior/during/after for proper posture, positioning, and execution for safe performance with home exercise program.   POSTURE: Patient educated on postural awareness to reduce stress and maintain optimal alignment of the spine with static positions and dynamic movement     Written Home Exercises Provided: yes.  Exercises were reviewed and Kasie was able " to demonstrate them prior to the end of the session.  Kasie demonstrated good  understanding of the education provided. See EMR under Patient Instructions for exercises provided during therapy sessions.    Assessment     Patient did well with treatment today. Added manual therapy to neck this visit as documented above in objective section to see if this helps with dizziness s/s. Discussed with patient to take note of s/s in days following today's visit to see if she notices any improvements or if s/s remain the same. Patient expressed understanding. Hypersensitivity also noted along entire neck and lower back region. Patient demonstrates improvements in balance but is still limited, requiring either min A from PT or UE support to prevent LOB with airex activities.     Kasie is progressing well towards her goals.   Patient prognosis is Guarded.     Patient will continue to benefit from skilled outpatient physical therapy to address the deficits listed in the problem list box on initial evaluation, provide pt/family education and to maximize patient's level of independence in the home and community environment.     Patient's spiritual, cultural and educational needs considered and pt agreeable to plan of care and goals.     Anticipated Barriers for therapy: co-morbidities, chronicity of condition, lack of understanding of condition, adherence to treatment plan, and transportation     GOALS:     Short Term Goals:  6 weeks Progress   Dizziness: Patient will report decreased dizziness, and recent s/s trend is improving in order to progress towards LTG's. PC   Function: Patient will demonstrate improved function as indicated by a functional status score of less than or equal to 54 out of 100 on FOTO. PC   Gait: Patient will demonstrate improved gait mechanics in order to improve functional mobility, improve quality of life, and decrease risk of further injury or fall.  Met  8/23/2023   HEP: Patient will demonstrate  independence with HEP in order to progress toward functional independence. Met  7/21/2023   Improve postural awareness.  Met  8/23/2023         Long Term Goals:  12 weeks Progress   Dizziness:  Patient will return to normal ADL, recreational, and work related activities with less dizziness and limitation.  PC   Function: Patient will demonstrate improved function as indicated by a functional status score of less than or equal to 50 out of 100 on FOTO. PC   Gait: Patient will demonstrate normalized gait mechanics with minimal compensation in order to return to PLOF. PC   Decrease saccades as well as positional and VOR related s/s in order for patient to be able to switch positions or look quickly to different directions without onset of dizziness.  PC      Goals Key:  PC= progressing/continue;        PM= partially met;             DC= discontinue    Plan     Continue Plan of Care (POC) and progress per patient tolerance. See treatment section for details on planned progressions next session.    8/23/2023: It is my recommendation that patient continue with PT at her current frequency of 2 times per week for the remainder of her approved visits. Her treatment plan will remain the same, and she will be progressed appropriately. If s/s do not improve, she will be referred back to MD for further assessment and treatment options.     7/21/2023: It is my recommendation that patient continue with PT at her current frequency of 2 times per week for the remainder of her approved visits. Her treatment plan will remain the same, and she will be progressed appropriately.      6/15/2023 (evaluation): Outpatient Physical Therapy 1 times weekly for 12 weeks to include any combination of the following interventions: vestibular habituation exercises, dry needling, modalities, electrical stimulation (IFC, Pre-Mod, Attended with Functional Dry Needling), Cervical/Lumbar Traction, Gait Training, Manual Therapy, Neuromuscular Re-ed,  Patient Education, Self Care, Therapeutic Exercise, and Therapeutic Activites     Tiffany Ryan, PT

## 2023-09-01 ENCOUNTER — CLINICAL SUPPORT (OUTPATIENT)
Dept: REHABILITATION | Facility: HOSPITAL | Age: 53
End: 2023-09-01
Payer: COMMERCIAL

## 2023-09-01 DIAGNOSIS — R41.841 COGNITIVE COMMUNICATION DISORDER: Primary | ICD-10-CM

## 2023-09-01 PROCEDURE — 97130 THER IVNTJ EA ADDL 15 MIN: CPT | Performed by: SPEECH-LANGUAGE PATHOLOGIST

## 2023-09-01 PROCEDURE — 97129 THER IVNTJ 1ST 15 MIN: CPT | Performed by: SPEECH-LANGUAGE PATHOLOGIST

## 2023-09-01 NOTE — PROGRESS NOTES
[facility]  Speech Therapy Treatment Note- Neurological Rehabilitation  Date: 9/1/2023     Name: Kasie Quevedo   MRN: [id number]   Therapy Diagnosis:   Encounter Diagnosis   Name Primary?    Cognitive communication disorder Yes   Physician: Jose Ramon Roland MD  Physician Orders: Ambulatory Referral to Speech Therapy   Medical Diagnosis: Difficulty with speech [R47.9]    Visit # / Visits Authorized:  8 / 20 (+eval)  Date of Evaluation:  7/10/2023   Insurance Authorization Period: 7/21/2023 - 12/31/2023  Plan of Care Certification:    7/10/2023 to 10/03/23   Progress Note: 09/01/2023     Time In:  1:35 PM  Time Out:  2:02 PM  Total Billable Time: 27 minutes     Precautions: Standard and Fall  Subjective:   Patient reports: she is doing okay. She reports she has been busy and was about 20 minutes late due to traffic. She also reports increased tinnitus with some increase in dizziness today.   She was compliant to home exercise program.     Response to previous treatment: attempting to implement strategies to facilitate attention     Pain Scale: no pain indicated throughout session  Objective:   TIMED  Procedure Min.   Cognitive Therapeutic Interventions, first 15 minutes CPT 82740  15   Cognitive Therapeutic Interventions, each additional 15 minutes CPT 17516  8           Short Term Goals: (6 weeks) Current Progress:   Patient will sustain attention to complete moderate to complex reasoning tasks with one request for clarification to increase sustained attention.      Goal Met 8/9/2023     Auditory: met x3  Visual: met x1 Auditory Sustained Attention  Patient completed auditory sustained attention task in which she was asked to listen to voicemails and answer questions about what she heard. Task completed in distraction-free environment. She listened to 2 voicemails, taking detailed notes and requesting frequent repetition of information she heard. She answered questions with 100% accuracy and  "reported 3/7 on relative scale of cognitive load.     Visual Sustained Attention:  Patient completed visual sustained attention task of completing medication management and fulfilling pill box based on frequency/directions on side of each pill bottle. Patient completed task with 100% accuracy with reported 1/7 on relative scale of cognitive load and no reported difficulty.      2. Patient will complete selective attention tasks (auditory or visual) with 90% accuracy independently increase selective attention.     Progressing/ Not Met 9/1/2023    Auditory: met x1  Visual: met x3 Auditory Selective Attention:  Patient listened to videos of personally relevant/interest information ([employer]) per patient as reported favorite TV show, and answered questions about what she watched. She took notes and use paused, play, rewind as needed to facilitate attention. She answered questions with 100% accuracy and initial cueing to take notes and use video features as needed. She rated this task as 2.5/7 on relative scale of cognitive load. She initially was attempting to take notes on every detail of video, but then stated, "maybe I'll just listen" and listened to entire video, then repeated video to listen for specific details as needed.     Visual Selective Attention:  Patient completed visual selective attention task in which she was given a menu and asked several questions requiring her to scan menus, locate relevant information, and then complete calculation or problem solving to answer questions. She answered questions/calculations with 93% accuracy in 12/13 trials and minimal verbal cueing for navigation of menu and use of strategies to facilitate accuracy such as requesting repetition of questions to ensure she understood correctly. She demonstrated overall great improvement in use of strategies compared to previous, with requesting help/asking questions to facilitate accuracy as needed.      3. Patient will use " attention shifting strategies to shift attention between two tasks (auditory or visual) with no more than 3 cues or 90% accuracy to improve alternating attention.      Progressing/ Not Met 9/1/2023   Not formally addressed      4. Patient will complete mental manipulation tasks with 90% acc to improve working memory.        Progressing/ Not Met 9/1/2023   Not formally addressed      5. Patient will use Goal Plan Action Review strategy to complete moderate to complex reasoning, planning, or organization tasks with 90% accuracy independently to improve functional executive function skills.      Progressing/ Not Met 9/1/2023   Not formally addressed      6. Patient will determine a list of possible solutions, given a problem scenario in a structured setting with 90% accuracy across 2 sessions.      Progressing/ Not Met 9/1/2023   Not formally addressed         7. Patient will complete a functional task to improve attention, memory and/or executive functions (I.e. sample bill paying activity, recipe, or multiple choice comprehension questions to 1 paragraphs) with 80% accuracy and natural environment noise distractions (TV news background, music, etc.).     Progressing/ Not Met 9/1/2023   Not formally addressed      8. Patient will be educated regarding cognitive deficits as applicable to the patient's life for increased awareness and will execute returned demonstration of information with 80% accuracy.      Goal Met 7/10/2023   Patient was educated thoroughly regarding a variety of foundational cognitive elements related to mild TBI including the cognitive triangle (with emphasis on foundational versus higher level cognitive functions, awareness of deficits, impact of attention, information processing, memory, and executive functioning deficits as each area relates to assessment findings), spoon theory to discuss cognitive versus physical versus emotional fatigue and management of each to more efficiently use energy  daily for more or less cognitively demanding tasks, internal versus external distractions and management of each, as well as overall impact of these deficits on daily functioning. Patient demonstrated awareness of information provided as demonstrated by asking good questions and expressing understanding.      9. Patient will utilize various strategies such as efficient visual scanning and organization to facilitate completion of a task in a dynamic environment (various sensory stimuli) with minimal assistance for use of strategies.      Progressing/ Not Met 9/1/2023    Not formally addressed              Patient Education/Response:   Patient educated regarding the following:  Patient was educated thoroughly regarding a variety of foundational cognitive elements related to mild TBI including the cognitive triangle (with emphasis on foundational versus higher level cognitive functions, awareness of deficits, impact of attention, information processing, memory, and executive functioning deficits as each area relates to assessment findings), spoon theory to discuss cognitive versus physical versus emotional fatigue and management of each to more efficiently use energy daily for more or less cognitively demanding tasks, internal versus external distractions and management of each, as well as overall impact of these deficits on daily functioning. Patient demonstrated awareness of information provided as demonstrated by asking good questions and expressing understanding.    Home program established: yes-management of energy/fatigue; being mindful of distractions in the environment   Patient verbalized understanding to all above education provided.     See Electronic Medical Record under Patient Instructions for exercises provided throughout therapy.  Assessment:   Patient with overall progress toward use of adequate amount of strategies for accurate execution of auditory selective attention task in today's session compared  to previous. She used strategies more effectively in today's session to improve cognitive load and overall accurate execution. Will continue to progress per patient tolerance.     Kasie is progressing well towards her goals. Current goals remain appropriate. Goals to be updated as necessary.     Patient prognosis is Good. Patient will continue to benefit from skilled outpatient speech and language therapy to address the deficits listed in the problem list on initial evaluation, provide patient/family education and to maximize patient's level of independence in the home and community environment.     Medical necessity is demonstrated by the following IMPAIRMENTS:  Cognition: Deficits in executive functioning, attention, and memory prevent the pt from relaying medically and safety relevant information in a timely manner in a state of emergency.     Barriers to Therapy: NA  Patient's spiritual, cultural and educational needs considered and patient agreeable to plan of care and goals.  Plan:   Continue Plan of Care with focus on rehabilitation and compensation for mild to moderate cognitive impairment.     Rhianna Perez MA, CCC-SLP  Speech Language Pathologist  9/1/2023

## 2023-09-13 ENCOUNTER — CLINICAL SUPPORT (OUTPATIENT)
Dept: REHABILITATION | Facility: HOSPITAL | Age: 53
End: 2023-09-13
Payer: COMMERCIAL

## 2023-09-13 DIAGNOSIS — R41.841 COGNITIVE COMMUNICATION DISORDER: Primary | ICD-10-CM

## 2023-09-13 PROCEDURE — 97129 THER IVNTJ 1ST 15 MIN: CPT | Performed by: SPEECH-LANGUAGE PATHOLOGIST

## 2023-09-13 PROCEDURE — 97130 THER IVNTJ EA ADDL 15 MIN: CPT | Performed by: SPEECH-LANGUAGE PATHOLOGIST

## 2023-09-13 NOTE — PROGRESS NOTES
[facility]  Speech Therapy Treatment Note- Neurological Rehabilitation  Date: 9/13/2023     Name: Kasie Quevedo   MRN: [id number]   Therapy Diagnosis:   Encounter Diagnosis   Name Primary?    Cognitive communication disorder Yes   Physician: Jose Ramon Roland MD  Physician Orders: Ambulatory Referral to Speech Therapy   Medical Diagnosis: Difficulty with speech [R47.9]    Visit # / Visits Authorized:  9 / 20 (+eval)  Date of Evaluation:  7/10/2023   Insurance Authorization Period: 7/21/2023 - 12/31/2023  Plan of Care Certification:    7/10/2023 to 10/03/23   Progress Note: 09/01/2023     Time In:  1:31 PM  Time Out:  2:00 PM  Total Billable Time: 29 minutes     Precautions: Standard and Fall  Subjective:   Patient reports: she is doing okay. She reports she has been busy and was about 15 minutes late due to traffic. She also reports she still has some tinnitus and dizziness in the past few weeks.   She was compliant to home exercise program.     Response to previous treatment: attempting to implement strategies to facilitate attention     Pain Scale: no pain indicated throughout session  Objective:   TIMED  Procedure Min.   Cognitive Therapeutic Interventions, first 15 minutes CPT 09959  15   Cognitive Therapeutic Interventions, each additional 15 minutes CPT 29626  14           Short Term Goals: (6 weeks) Current Progress:   Patient will sustain attention to complete moderate to complex reasoning tasks with one request for clarification to increase sustained attention.      Goal Met 8/9/2023     Auditory: met x3  Visual: met x1 Auditory Sustained Attention  Patient completed auditory sustained attention task in which she was asked to listen to voicemails and answer questions about what she heard. Task completed in distraction-free environment. She listened to 2 voicemails, taking detailed notes and requesting frequent repetition of information she heard. She answered questions with 100%  "accuracy and reported 3/7 on relative scale of cognitive load.     Visual Sustained Attention:  Patient completed visual sustained attention task of completing medication management and fulfilling pill box based on frequency/directions on side of each pill bottle. Patient completed task with 100% accuracy with reported 1/7 on relative scale of cognitive load and no reported difficulty.      2. Patient will complete selective attention tasks (auditory or visual) with 90% accuracy independently increase selective attention.     Goal Met 9/13/2023    Auditory: met x2  Visual: met x3 Auditory Selective Attention:  Patient listened to [employer] talk video of increasing complexity/abstract concepts and answered questions about what she watched. She took notes and use paused, play, rewind as needed to facilitate attention. She answered questions with 90% accuracy and initial cueing to take notes and use video features as needed. She rated this task as 4/7 on relative scale of cognitive load. She initially was attempting to take notes on every detail of video, but then stated, "maybe I'll just listen" and listened to entire video, then repeated video to listen for specific details as needed.     Visual Selective Attention:  Patient completed visual selective attention task in which she was given a menu and asked several questions requiring her to scan menus, locate relevant information, and then complete calculation or problem solving to answer questions. She answered questions/calculations with 93% accuracy in 12/13 trials and minimal verbal cueing for navigation of menu and use of strategies to facilitate accuracy such as requesting repetition of questions to ensure she understood correctly. She demonstrated overall great improvement in use of strategies compared to previous, with requesting help/asking questions to facilitate accuracy as needed.      3. Patient will use attention shifting strategies to shift attention between " two tasks (auditory or visual) with no more than 3 cues or 90% accuracy to improve alternating attention.      Progressing/ Not Met 9/13/2023   Not formally addressed      4. Patient will complete mental manipulation tasks with 90% acc to improve working memory.        Progressing/ Not Met 9/13/2023   Not formally addressed      5. Patient will use Goal Plan Action Review strategy to complete moderate to complex reasoning, planning, or organization tasks with 90% accuracy independently to improve functional executive function skills.      Progressing/ Not Met 9/13/2023   Not formally addressed      6. Patient will determine a list of possible solutions, given a problem scenario in a structured setting with 90% accuracy across 2 sessions.      Progressing/ Not Met 9/13/2023   Not formally addressed         7. Patient will complete a functional task to improve attention, memory and/or executive functions (I.e. sample bill paying activity, recipe, or multiple choice comprehension questions to 1 paragraphs) with 80% accuracy and natural environment noise distractions (TV news background, music, etc.).     Progressing/ Not Met 9/13/2023   Not formally addressed      8. Patient will be educated regarding cognitive deficits as applicable to the patient's life for increased awareness and will execute returned demonstration of information with 80% accuracy.      Goal Met 7/10/2023   Patient was educated thoroughly regarding a variety of foundational cognitive elements related to mild TBI including the cognitive triangle (with emphasis on foundational versus higher level cognitive functions, awareness of deficits, impact of attention, information processing, memory, and executive functioning deficits as each area relates to assessment findings), spoon theory to discuss cognitive versus physical versus emotional fatigue and management of each to more efficiently use energy daily for more or less cognitively demanding tasks,  internal versus external distractions and management of each, as well as overall impact of these deficits on daily functioning. Patient demonstrated awareness of information provided as demonstrated by asking good questions and expressing understanding.      9. Patient will utilize various strategies such as efficient visual scanning and organization to facilitate completion of a task in a dynamic environment (various sensory stimuli) with minimal assistance for use of strategies.      Progressing/ Not Met 9/13/2023    Not formally addressed              Patient Education/Response:   Patient educated regarding the following:  Patient was educated thoroughly regarding a variety of foundational cognitive elements related to mild TBI including the cognitive triangle (with emphasis on foundational versus higher level cognitive functions, awareness of deficits, impact of attention, information processing, memory, and executive functioning deficits as each area relates to assessment findings), spoon theory to discuss cognitive versus physical versus emotional fatigue and management of each to more efficiently use energy daily for more or less cognitively demanding tasks, internal versus external distractions and management of each, as well as overall impact of these deficits on daily functioning. Patient demonstrated awareness of information provided as demonstrated by asking good questions and expressing understanding.    Home program established: yes-management of energy/fatigue; being mindful of distractions in the environment   Patient verbalized understanding to all above education provided.     See Electronic Medical Record under Patient Instructions for exercises provided throughout therapy.  Assessment:   Patient with overall progress toward use of adequate amount of strategies for accurate execution of auditory selective attention task in today's session compared to previous. She used strategies more effectively  in today's session to improve cognitive load and overall accurate execution. Will continue to progress per patient tolerance.     Kasie is progressing well towards her goals. Current goals remain appropriate. Goals to be updated as necessary.     Patient prognosis is Good. Patient will continue to benefit from skilled outpatient speech and language therapy to address the deficits listed in the problem list on initial evaluation, provide patient/family education and to maximize patient's level of independence in the home and community environment.     Medical necessity is demonstrated by the following IMPAIRMENTS:  Cognition: Deficits in executive functioning, attention, and memory prevent the pt from relaying medically and safety relevant information in a timely manner in a state of emergency.     Barriers to Therapy: NA  Patient's spiritual, cultural and educational needs considered and patient agreeable to plan of care and goals.  Plan:   Continue Plan of Care with focus on rehabilitation and compensation for mild to moderate cognitive impairment.     Rhianna Perez MA, CCC-SLP  Speech Language Pathologist  9/13/2023

## 2023-09-20 ENCOUNTER — CLINICAL SUPPORT (OUTPATIENT)
Dept: REHABILITATION | Facility: HOSPITAL | Age: 53
End: 2023-09-20
Payer: COMMERCIAL

## 2023-09-20 DIAGNOSIS — R41.841 COGNITIVE COMMUNICATION DISORDER: Primary | ICD-10-CM

## 2023-09-20 PROCEDURE — 97130 THER IVNTJ EA ADDL 15 MIN: CPT | Performed by: SPEECH-LANGUAGE PATHOLOGIST

## 2023-09-20 PROCEDURE — 97129 THER IVNTJ 1ST 15 MIN: CPT | Performed by: SPEECH-LANGUAGE PATHOLOGIST

## 2023-09-20 NOTE — PROGRESS NOTES
"[facility]  Speech Therapy Treatment Note- Neurological Rehabilitation  Date: 9/20/2023     Name: Kasie Quevedo   MRN: [id number]   Therapy Diagnosis:   Encounter Diagnosis   Name Primary?    Cognitive communication disorder Yes   Physician: Jose Ramon Roland MD  Physician Orders: Ambulatory Referral to Speech Therapy   Medical Diagnosis: Difficulty with speech [R47.9]    Visit # / Visits Authorized:  10 / 20 (+eval)  Date of Evaluation:  7/10/2023   Insurance Authorization Period: 7/21/2023 - 12/31/2023  Plan of Care Certification:    7/10/2023 to 10/03/23   Progress Note: 09/01/2023     Time In:  1:25 PM  Time Out:  2:03 PM  Total Billable Time: 38 minutes     Precautions: Standard and Fall  Subjective:   Patient reports: she is still staying with her mom. She reports she is overall doing "pretty good"; she has had less symptoms of dizziness and tinnitus this week.   She was compliant to home exercise program.     Response to previous treatment: attempting to implement strategies to facilitate attention     Pain Scale: no pain indicated throughout session  Objective:   TIMED  Procedure Min.   Cognitive Therapeutic Interventions, first 15 minutes CPT 74124  15   Cognitive Therapeutic Interventions, each additional 15 minutes CPT 98734  23           Short Term Goals: (6 weeks) Current Progress:   Patient will sustain attention to complete moderate to complex reasoning tasks with one request for clarification to increase sustained attention.      Goal Met 8/9/2023     Auditory: met x3  Visual: met x1 Auditory Sustained Attention  Patient completed auditory sustained attention task in which she was asked to listen to voicemails and answer questions about what she heard. Task completed in distraction-free environment. She listened to 2 voicemails, taking detailed notes and requesting frequent repetition of information she heard. She answered questions with 100% accuracy and reported 3/7 on relative " "scale of cognitive load.     Visual Sustained Attention:  Patient completed visual sustained attention task of completing medication management and fulfilling pill box based on frequency/directions on side of each pill bottle. Patient completed task with 100% accuracy with reported 1/7 on relative scale of cognitive load and no reported difficulty.      2. Patient will complete selective attention tasks (auditory or visual) with 90% accuracy independently increase selective attention.     Goal Met 9/13/2023    Auditory: met x2  Visual: met x3 Auditory Selective Attention:  Patient listened to [employer] talk video of increasing complexity/abstract concepts and answered questions about what she watched. She took notes and use paused, play, rewind as needed to facilitate attention. She answered questions with 90% accuracy and initial cueing to take notes and use video features as needed. She rated this task as 4/7 on relative scale of cognitive load. She initially was attempting to take notes on every detail of video, but then stated, "maybe I'll just listen" and listened to entire video, then repeated video to listen for specific details as needed.     Visual Selective Attention:  Patient completed visual selective attention task in which she was given a menu and asked several questions requiring her to scan menus, locate relevant information, and then complete calculation or problem solving to answer questions. She answered questions/calculations with 93% accuracy in 12/13 trials and minimal verbal cueing for navigation of menu and use of strategies to facilitate accuracy such as requesting repetition of questions to ensure she understood correctly. She demonstrated overall great improvement in use of strategies compared to previous, with requesting help/asking questions to facilitate accuracy as needed.      3. Patient will use attention shifting strategies to shift attention between two tasks (auditory or visual) with no " "more than 3 cues or 90% accuracy to improve alternating attention.      Progressing/ Not Met 9/20/2023   Not formally addressed      4. Patient will complete mental manipulation tasks with 90% acc to improve working memory.        Progressing/ Not Met 9/20/2023   Not formally addressed      5. Patient will use Goal Plan Action Review strategy to complete moderate to complex reasoning, planning, or organization tasks with 90% accuracy independently to improve functional executive function skills.      Progressing/ Not Met 9/20/2023   Not formally addressed      6. Patient will determine a list of possible solutions, given a problem scenario in a structured setting with 90% accuracy across 2 sessions.      Progressing/ Not Met 9/20/2023   Not formally addressed         7. Patient will complete a functional task to improve attention, memory and/or executive functions (I.e. sample bill paying activity, recipe, or multiple choice comprehension questions to 1 paragraphs) with 80% accuracy and natural environment noise distractions (TV news background, music, etc.).     Progressing/ Not Met 9/20/2023   Patient initiated functional "car buying" task in today's session in which she was given a "wish list" with several car options and descriptions of each car and was asked to determine, based on descriptions of each vehicle, which car met the most options of the desired wish list. She began task in today's session; will plan to complete task in next session. Discussion of strategies to use prior to task initiation such as highlighting/underlining relevant information and asking clarifying questions as needed.    8. Patient will be educated regarding cognitive deficits as applicable to the patient's life for increased awareness and will execute returned demonstration of information with 80% accuracy.      Goal Met 7/10/2023   Patient was educated thoroughly regarding a variety of foundational cognitive elements related to mild " TBI including the cognitive triangle (with emphasis on foundational versus higher level cognitive functions, awareness of deficits, impact of attention, information processing, memory, and executive functioning deficits as each area relates to assessment findings), spoon theory to discuss cognitive versus physical versus emotional fatigue and management of each to more efficiently use energy daily for more or less cognitively demanding tasks, internal versus external distractions and management of each, as well as overall impact of these deficits on daily functioning. Patient demonstrated awareness of information provided as demonstrated by asking good questions and expressing understanding.      9. Patient will utilize various strategies such as efficient visual scanning and organization to facilitate completion of a task in a dynamic environment (various sensory stimuli) with minimal assistance for use of strategies.      Progressing/ Not Met 9/20/2023    Not formally addressed              Patient Education/Response:   Patient educated regarding the following:  Patient was educated thoroughly regarding a variety of foundational cognitive elements related to mild TBI including the cognitive triangle (with emphasis on foundational versus higher level cognitive functions, awareness of deficits, impact of attention, information processing, memory, and executive functioning deficits as each area relates to assessment findings), spoon theory to discuss cognitive versus physical versus emotional fatigue and management of each to more efficiently use energy daily for more or less cognitively demanding tasks, internal versus external distractions and management of each, as well as overall impact of these deficits on daily functioning. Patient demonstrated awareness of information provided as demonstrated by asking good questions and expressing understanding.    Home program established: yes-management of energy/fatigue;  being mindful of distractions in the environment   Patient verbalized understanding to all above education provided.     See Electronic Medical Record under Patient Instructions for exercises provided throughout therapy.  Assessment:   Discussion in today's session regarding rationale for cognitive therapy with emphasis on going about execution of daily cognitive tasks in new ways with a different mindset/perspective toward task execution with emphasis on accuracy over speed and reducing cognitive load through strategy use. Patient expressed understanding of information provided. She also initiated complex functional task in today's session with goal to complete in upcoming session.    Kasie is progressing well towards her goals. Current goals remain appropriate. Goals to be updated as necessary.     Patient prognosis is Good. Patient will continue to benefit from skilled outpatient speech and language therapy to address the deficits listed in the problem list on initial evaluation, provide patient/family education and to maximize patient's level of independence in the home and community environment.     Medical necessity is demonstrated by the following IMPAIRMENTS:  Cognition: Deficits in executive functioning, attention, and memory prevent the pt from relaying medically and safety relevant information in a timely manner in a state of emergency.     Barriers to Therapy: NA  Patient's spiritual, cultural and educational needs considered and patient agreeable to plan of care and goals.  Plan:   Continue Plan of Care with focus on rehabilitation and compensation for mild to moderate cognitive impairment.     Rhianna Perez MA, CCC-SLP  Speech Language Pathologist  9/20/2023

## 2023-09-22 ENCOUNTER — CLINICAL SUPPORT (OUTPATIENT)
Dept: REHABILITATION | Facility: HOSPITAL | Age: 53
End: 2023-09-22
Payer: COMMERCIAL

## 2023-09-22 DIAGNOSIS — R41.841 COGNITIVE COMMUNICATION DISORDER: Primary | ICD-10-CM

## 2023-09-22 PROCEDURE — 97130 THER IVNTJ EA ADDL 15 MIN: CPT | Performed by: SPEECH-LANGUAGE PATHOLOGIST

## 2023-09-22 PROCEDURE — 97129 THER IVNTJ 1ST 15 MIN: CPT | Performed by: SPEECH-LANGUAGE PATHOLOGIST

## 2023-09-22 NOTE — PROGRESS NOTES
"[facility]  Speech Therapy Treatment Note- Neurological Rehabilitation  Date: 9/22/2023     Name: Kasie Quevedo   MRN: [id number]   Therapy Diagnosis:   Encounter Diagnosis   Name Primary?    Cognitive communication disorder Yes   Physician: Jose Ramon Roland MD  Physician Orders: Ambulatory Referral to Speech Therapy   Medical Diagnosis: Difficulty with speech [R47.9]    Visit # / Visits Authorized:  11 / 20 (+eval)  Date of Evaluation:  7/10/2023   Insurance Authorization Period: 7/21/2023 - 12/31/2023  Plan of Care Certification:    7/10/2023 to 10/03/23   Progress Note: 09/01/2023     Time In:  1:38 PM  Time Out:  2:03 PM  Total Billable Time: 38 minutes     Precautions: Standard and Fall  Subjective:   Patient reports: she is still staying with her mom. She reports she is overall doing "pretty good"; she has had less symptoms of dizziness and tinnitus this week. She arrived to session about 25 minutes late.   She was compliant to home exercise program.     Response to previous treatment: attempting to implement strategies to facilitate attention     Pain Scale: no pain indicated throughout session  Objective:   TIMED  Procedure Min.   Cognitive Therapeutic Interventions, first 15 minutes CPT 48265  15   Cognitive Therapeutic Interventions, each additional 15 minutes CPT 98024  10           Short Term Goals: (6 weeks) Current Progress:   Patient will sustain attention to complete moderate to complex reasoning tasks with one request for clarification to increase sustained attention.      Goal Met 8/9/2023     Auditory: met x3  Visual: met x1 Auditory Sustained Attention  Patient completed auditory sustained attention task in which she was asked to listen to voicemails and answer questions about what she heard. Task completed in distraction-free environment. She listened to 2 voicemails, taking detailed notes and requesting frequent repetition of information she heard. She answered questions " "with 100% accuracy and reported 3/7 on relative scale of cognitive load.     Visual Sustained Attention:  Patient completed visual sustained attention task of completing medication management and fulfilling pill box based on frequency/directions on side of each pill bottle. Patient completed task with 100% accuracy with reported 1/7 on relative scale of cognitive load and no reported difficulty.      2. Patient will complete selective attention tasks (auditory or visual) with 90% accuracy independently increase selective attention.     Goal Met 9/13/2023    Auditory: met x2  Visual: met x3 Auditory Selective Attention:  Patient listened to [employer] talk video of increasing complexity/abstract concepts and answered questions about what she watched. She took notes and use paused, play, rewind as needed to facilitate attention. She answered questions with 90% accuracy and initial cueing to take notes and use video features as needed. She rated this task as 4/7 on relative scale of cognitive load. She initially was attempting to take notes on every detail of video, but then stated, "maybe I'll just listen" and listened to entire video, then repeated video to listen for specific details as needed.     Visual Selective Attention:  Patient completed visual selective attention task in which she was given a menu and asked several questions requiring her to scan menus, locate relevant information, and then complete calculation or problem solving to answer questions. She answered questions/calculations with 93% accuracy in 12/13 trials and minimal verbal cueing for navigation of menu and use of strategies to facilitate accuracy such as requesting repetition of questions to ensure she understood correctly. She demonstrated overall great improvement in use of strategies compared to previous, with requesting help/asking questions to facilitate accuracy as needed.      3. Patient will use attention shifting strategies to shift " "attention between two tasks (auditory or visual) with no more than 3 cues or 90% accuracy to improve alternating attention.      Progressing/ Not Met 9/22/2023   Not formally addressed      4. Patient will complete mental manipulation tasks with 90% acc to improve working memory.        Progressing/ Not Met 9/22/2023   Not formally addressed      5. Patient will use Goal Plan Action Review strategy to complete moderate to complex reasoning, planning, or organization tasks with 90% accuracy independently to improve functional executive function skills.      Progressing/ Not Met 9/22/2023   Not formally addressed      6. Patient will determine a list of possible solutions, given a problem scenario in a structured setting with 90% accuracy across 2 sessions.      Progressing/ Not Met 9/22/2023   Not formally addressed         7. Patient will complete a functional task to improve attention, memory and/or executive functions (I.e. sample bill paying activity, recipe, or multiple choice comprehension questions to 1 paragraphs) with 80% accuracy and natural environment noise distractions (TV news background, music, etc.).     Progressing/ Not Met 9/22/2023    Met x1 Patient completed functional "car buying" task in today's session in which she was given a "wish list" with several car options and descriptions of each car and was asked to determine, based on descriptions of each vehicle, which car met the most options of the desired wish list. She compelted task in today's session, achieving 94% accuracy in 68/72 trials and rating task as 4/7 on relative scale of cognitive load. Discussion of strategies to use prior to task initiation such as highlighting/underlining relevant information and asking clarifying questions as needed. Patient reported ease in completion of task with relatively high accuracy and average cognitive load.     8. Patient will be educated regarding cognitive deficits as applicable to the patient's life " for increased awareness and will execute returned demonstration of information with 80% accuracy.      Goal Met 7/10/2023   Patient was educated thoroughly regarding a variety of foundational cognitive elements related to mild TBI including the cognitive triangle (with emphasis on foundational versus higher level cognitive functions, awareness of deficits, impact of attention, information processing, memory, and executive functioning deficits as each area relates to assessment findings), spoon theory to discuss cognitive versus physical versus emotional fatigue and management of each to more efficiently use energy daily for more or less cognitively demanding tasks, internal versus external distractions and management of each, as well as overall impact of these deficits on daily functioning. Patient demonstrated awareness of information provided as demonstrated by asking good questions and expressing understanding.      9. Patient will utilize various strategies such as efficient visual scanning and organization to facilitate completion of a task in a dynamic environment (various sensory stimuli) with minimal assistance for use of strategies.      Progressing/ Not Met 9/22/2023    Not formally addressed              Patient Education/Response:   Patient educated regarding the following:  Patient was educated thoroughly regarding a variety of foundational cognitive elements related to mild TBI including the cognitive triangle (with emphasis on foundational versus higher level cognitive functions, awareness of deficits, impact of attention, information processing, memory, and executive functioning deficits as each area relates to assessment findings), spoon theory to discuss cognitive versus physical versus emotional fatigue and management of each to more efficiently use energy daily for more or less cognitively demanding tasks, internal versus external distractions and management of each, as well as overall impact of  these deficits on daily functioning. Patient demonstrated awareness of information provided as demonstrated by asking good questions and expressing understanding.    Home program established: yes-management of energy/fatigue; being mindful of distractions in the environment   Patient verbalized understanding to all above education provided.     See Electronic Medical Record under Patient Instructions for exercises provided throughout therapy.  Assessment:   Discussion in today's session regarding rationale for cognitive therapy with emphasis on going about execution of daily cognitive tasks in new ways with a different mindset/perspective toward task execution with emphasis on accuracy over speed and reducing cognitive load through strategy use. Completion of functional task with adequate cognitive load and high accuracy and good use of strategies independently. Will continue to progress per patient tolerance in upcoming sessions.     Kasie is progressing well towards her goals. Current goals remain appropriate. Goals to be updated as necessary.     Patient prognosis is Good. Patient will continue to benefit from skilled outpatient speech and language therapy to address the deficits listed in the problem list on initial evaluation, provide patient/family education and to maximize patient's level of independence in the home and community environment.     Medical necessity is demonstrated by the following IMPAIRMENTS:  Cognition: Deficits in executive functioning, attention, and memory prevent the pt from relaying medically and safety relevant information in a timely manner in a state of emergency.     Barriers to Therapy: NA  Patient's spiritual, cultural and educational needs considered and patient agreeable to plan of care and goals.  Plan:   Continue Plan of Care with focus on rehabilitation and compensation for mild to moderate cognitive impairment.     Rhianna Perez MA, CCC-SLP  Speech Language  Pathologist  9/22/2023

## 2023-09-28 ENCOUNTER — HOSPITAL ENCOUNTER (OUTPATIENT)
Dept: CARDIOLOGY | Facility: HOSPITAL | Age: 53
Discharge: HOME | End: 2023-09-28
Attending: ORTHOPAEDIC SURGERY
Payer: COMMERCIAL

## 2023-09-28 DIAGNOSIS — Z01.812 PRE-OPERATIVE LABORATORY EXAMINATION: ICD-10-CM

## 2023-09-28 DIAGNOSIS — M54.16 LUMBAR RADICULOPATHY: ICD-10-CM

## 2023-09-28 DIAGNOSIS — Z79.01 LONG TERM (CURRENT) USE OF ANTICOAGULANTS: Primary | ICD-10-CM

## 2023-09-28 DIAGNOSIS — Z79.01 LONG TERM (CURRENT) USE OF ANTICOAGULANTS: ICD-10-CM

## 2023-09-28 PROCEDURE — 93010 EKG 12-LEAD: ICD-10-PCS | Mod: ,,, | Performed by: INTERNAL MEDICINE

## 2023-09-28 PROCEDURE — 93005 ELECTROCARDIOGRAM TRACING: CPT | Mod: PO

## 2023-09-28 PROCEDURE — 93010 ELECTROCARDIOGRAM REPORT: CPT | Mod: ,,, | Performed by: INTERNAL MEDICINE

## 2023-10-04 ENCOUNTER — CLINICAL SUPPORT (OUTPATIENT)
Dept: REHABILITATION | Facility: HOSPITAL | Age: 53
End: 2023-10-04
Payer: COMMERCIAL

## 2023-10-04 DIAGNOSIS — R41.841 COGNITIVE COMMUNICATION DISORDER: Primary | ICD-10-CM

## 2023-10-04 PROCEDURE — 97129 THER IVNTJ 1ST 15 MIN: CPT | Performed by: SPEECH-LANGUAGE PATHOLOGIST

## 2023-10-04 PROCEDURE — 97130 THER IVNTJ EA ADDL 15 MIN: CPT | Performed by: SPEECH-LANGUAGE PATHOLOGIST

## 2023-10-04 NOTE — PROGRESS NOTES
[facility]  Speech Therapy Treatment Note- Neurological Rehabilitation  Date: 10/4/2023     Name: Kasie Quevedo   MRN: [id number]   Therapy Diagnosis:   Encounter Diagnosis   Name Primary?    Cognitive communication disorder Yes   Physician: Jose Ramon Roland MD  Physician Orders: Ambulatory Referral to Speech Therapy   Medical Diagnosis: Difficulty with speech [R47.9]    Visit # / Visits Authorized:  12 / 20 (+eval)  Date of Evaluation:  7/10/2023   Insurance Authorization Period: 7/21/2023 - 12/31/2023  Plan of Care Certification:    7/10/2023 to 10/03/23   Progress Note: 09/01/2023     Time In:  1:15 PM  Time Out:  2:00 PM  Total Billable Time: 45 minutes     Precautions: Standard and Fall  Subjective:   Patient reports: she is doing well. She continues to implement strategies at home to facilitate attention, memory and compensate for cognitive deficits as able.   She was compliant to home exercise program.     Response to previous treatment: attempting to implement strategies to facilitate attention     Pain Scale: no pain indicated throughout session  Objective:   TIMED  Procedure Min.   Cognitive Therapeutic Interventions, first 15 minutes CPT 80339  15   Cognitive Therapeutic Interventions, each additional 15 minutes CPT 04873  30           Short Term Goals: (6 weeks) Current Progress:   Patient will sustain attention to complete moderate to complex reasoning tasks with one request for clarification to increase sustained attention.      Goal Met 8/9/2023     Auditory: met x3  Visual: met x1 Auditory Sustained Attention  Patient completed auditory sustained attention task in which she was asked to listen to voicemails and answer questions about what she heard. Task completed in distraction-free environment. She listened to 2 voicemails, taking detailed notes and requesting frequent repetition of information she heard. She answered questions with 100% accuracy and reported 3/7 on relative  "scale of cognitive load.     Visual Sustained Attention:  Patient completed visual sustained attention task of completing medication management and fulfilling pill box based on frequency/directions on side of each pill bottle. Patient completed task with 100% accuracy with reported 1/7 on relative scale of cognitive load and no reported difficulty.      2. Patient will complete selective attention tasks (auditory or visual) with 90% accuracy independently increase selective attention.     Goal Met 9/13/2023    Auditory: met x2  Visual: met x3 Auditory Selective Attention:  Patient listened to [employer] talk video of increasing complexity/abstract concepts and answered questions about what she watched. She took notes and use paused, play, rewind as needed to facilitate attention. She answered questions with 90% accuracy and initial cueing to take notes and use video features as needed. She rated this task as 4/7 on relative scale of cognitive load. She initially was attempting to take notes on every detail of video, but then stated, "maybe I'll just listen" and listened to entire video, then repeated video to listen for specific details as needed.     Visual Selective Attention:  Patient completed visual selective attention task in which she was given a menu and asked several questions requiring her to scan menus, locate relevant information, and then complete calculation or problem solving to answer questions. She answered questions/calculations with 93% accuracy in 12/13 trials and minimal verbal cueing for navigation of menu and use of strategies to facilitate accuracy such as requesting repetition of questions to ensure she understood correctly. She demonstrated overall great improvement in use of strategies compared to previous, with requesting help/asking questions to facilitate accuracy as needed.      3. Patient will use attention shifting strategies to shift attention between two tasks (auditory or visual) with no " "more than 3 cues or 90% accuracy to improve alternating attention.      Progressing/ Not Met 10/4/2023   Not formally addressed      4. Patient will complete mental manipulation tasks with 90% acc to improve working memory.        Progressing/ Not Met 10/4/2023   Not formally addressed      5. Patient will use Goal Plan Action Review strategy to complete moderate to complex reasoning, planning, or organization tasks with 90% accuracy independently to improve functional executive function skills.      Progressing/ Not Met 10/4/2023   Patient participated in Goal-Plan-Do-Review (GPDR) approach in today's session. The key components of this approach are executive function and self-regulation skills -- foundational skills that help us focus, make decisions, set goals, control impulses, make and execute plans and revise and adjust them when necessary. Goal requires metacognition and working memory, Plan requires planning/prioritization, organization, time management, working memory, Do requires task initiation, response inhibition, time management, sustained attention, working memory, flexibility, organization, persistence, stress tolerance, emotional control, cognitive flexibility and Review/Revise requires metacognition, working memory, flexibility.      6. Patient will determine a list of possible solutions, given a problem scenario in a structured setting with 90% accuracy across 2 sessions.      Progressing/ Not Met 10/4/2023   Not formally addressed         7. Patient will complete a functional task to improve attention, memory and/or executive functions (I.e. sample bill paying activity, recipe, or multiple choice comprehension questions to 1 paragraphs) with 80% accuracy and natural environment noise distractions (TV news background, music, etc.).     Progressing/ Not Met 10/4/2023    Met x1 Patient completed functional task in today's session in which she was hosting a "dinner party" and had several dinner and " dessert options to choose from to fit within a certain budget. She priced each item and overall meal cost. She initiated task; portion completed in today's session she completed with 91% accuracy and independent use of strategies such as highlighting relevant information, use of calculator, marking off item as she went all to facilitate attention. Task will be completed in next session. Task also discussed in conjunction with GPDR protocol for discussion of evaluation of problems preventatively to solve problems prior to them occurring. Will complete in next session.     8. Patient will be educated regarding cognitive deficits as applicable to the patient's life for increased awareness and will execute returned demonstration of information with 80% accuracy.      Goal Met 7/10/2023   Patient was educated thoroughly regarding a variety of foundational cognitive elements related to mild TBI including the cognitive triangle (with emphasis on foundational versus higher level cognitive functions, awareness of deficits, impact of attention, information processing, memory, and executive functioning deficits as each area relates to assessment findings), spoon theory to discuss cognitive versus physical versus emotional fatigue and management of each to more efficiently use energy daily for more or less cognitively demanding tasks, internal versus external distractions and management of each, as well as overall impact of these deficits on daily functioning. Patient demonstrated awareness of information provided as demonstrated by asking good questions and expressing understanding.      9. Patient will utilize various strategies such as efficient visual scanning and organization to facilitate completion of a task in a dynamic environment (various sensory stimuli) with minimal assistance for use of strategies.      Progressing/ Not Met 10/4/2023    Not formally addressed              Patient Education/Response:   Patient  educated regarding the following:  Patient was educated thoroughly regarding a variety of foundational cognitive elements related to mild TBI including the cognitive triangle (with emphasis on foundational versus higher level cognitive functions, awareness of deficits, impact of attention, information processing, memory, and executive functioning deficits as each area relates to assessment findings), spoon theory to discuss cognitive versus physical versus emotional fatigue and management of each to more efficiently use energy daily for more or less cognitively demanding tasks, internal versus external distractions and management of each, as well as overall impact of these deficits on daily functioning. Patient demonstrated awareness of information provided as demonstrated by asking good questions and expressing understanding.    Home program established: yes-management of energy/fatigue; being mindful of distractions in the environment   Patient verbalized understanding to all above education provided.     See Electronic Medical Record under Patient Instructions for exercises provided throughout therapy.  Assessment:   In today's session, progress toward accurate completion of complex functional task with emphasis on use of strategies as needed to facilitate accuracy and compensate for cognitive deficits. Patient expressed understanding of information provided and agreement with POC/discussion regarding discharge in next session.      Kasie is progressing well towards her goals. Current goals remain appropriate. Goals to be updated as necessary.     Patient prognosis is Good. Patient will continue to benefit from skilled outpatient speech and language therapy to address the deficits listed in the problem list on initial evaluation, provide patient/family education and to maximize patient's level of independence in the home and community environment.     Medical necessity is demonstrated by the following  IMPAIRMENTS:  Cognition: Deficits in executive functioning, attention, and memory prevent the pt from relaying medically and safety relevant information in a timely manner in a state of emergency.     Barriers to Therapy: NA  Patient's spiritual, cultural and educational needs considered and patient agreeable to plan of care and goals.  Plan:   Continue Plan of Care with focus on rehabilitation and compensation for mild to moderate cognitive impairment.     Rhianna Perez MA, CCC-SLP  Speech Language Pathologist  10/4/2023

## 2023-10-06 ENCOUNTER — CLINICAL SUPPORT (OUTPATIENT)
Dept: REHABILITATION | Facility: HOSPITAL | Age: 53
End: 2023-10-06
Payer: COMMERCIAL

## 2023-10-06 DIAGNOSIS — H81.90 PERIPHERAL VESTIBULOPATHY, UNSPECIFIED LATERALITY: Primary | ICD-10-CM

## 2023-10-06 DIAGNOSIS — R26.9 ABNORMALITY OF GAIT AND MOBILITY: ICD-10-CM

## 2023-10-06 DIAGNOSIS — R41.841 COGNITIVE COMMUNICATION DISORDER: Primary | ICD-10-CM

## 2023-10-06 DIAGNOSIS — R42 DIZZINESS: ICD-10-CM

## 2023-10-06 PROCEDURE — 97129 THER IVNTJ 1ST 15 MIN: CPT | Performed by: SPEECH-LANGUAGE PATHOLOGIST

## 2023-10-06 PROCEDURE — 97112 NEUROMUSCULAR REEDUCATION: CPT | Performed by: PHYSICAL THERAPIST

## 2023-10-06 PROCEDURE — 97130 THER IVNTJ EA ADDL 15 MIN: CPT | Performed by: SPEECH-LANGUAGE PATHOLOGIST

## 2023-10-06 NOTE — PROGRESS NOTES
"[facility]  Speech Therapy Treatment Note/Discharge Summary- Neurological Rehabilitation  Date: 10/6/2023     Name: Kasie Quevedo   MRN: [id number]   Therapy Diagnosis:   Encounter Diagnosis   Name Primary?    Cognitive communication disorder Yes   Physician: Jose Ramon Roland MD  Physician Orders: Ambulatory Referral to Speech Therapy   Medical Diagnosis: Difficulty with speech [R47.9]    Visit # / Visits Authorized:  13 / 20 (+eval)  Date of Evaluation:  7/10/2023   Insurance Authorization Period: 7/21/2023 - 12/31/2023  Plan of Care Certification:    7/10/2023 to 10/03/23   Progress Note: 09/01/2023     Date of Last visit: 10/6/2023   Total Visits Received: 14  Cancelled Visits: 3  No Show Visits: 1    Time In:  10:52 AM  Time Out:  11:45 AM  Total Billable Time: 53 minutes     Precautions: Standard and Fall  Subjective:   Patient reports: she is doing well. She reports tinnitus is "louder" than usual today. She continues to implement strategies at home to facilitate attention, memory and compensate for cognitive deficits as able.   She was compliant to home exercise program.     Response to previous treatment: attempting to implement strategies to facilitate attention     Pain Scale: no pain indicated throughout session  Objective:   TIMED  Procedure Min.   Cognitive Therapeutic Interventions, first 15 minutes CPT 68069  15   Cognitive Therapeutic Interventions, each additional 15 minutes CPT 29668  38           Short Term Goals: (6 weeks) Current Progress:   Patient will sustain attention to complete moderate to complex reasoning tasks with one request for clarification to increase sustained attention.      Goal Met 8/9/2023     Auditory: met x3  Visual: met x1 Auditory Sustained Attention  Patient completed auditory sustained attention task in which she was asked to listen to voicemails and answer questions about what she heard. Task completed in distraction-free environment. She listened " "to 2 voicemails, taking detailed notes and requesting frequent repetition of information she heard. She answered questions with 100% accuracy and reported 3/7 on relative scale of cognitive load.     Visual Sustained Attention:  Patient completed visual sustained attention task of completing medication management and fulfilling pill box based on frequency/directions on side of each pill bottle. Patient completed task with 100% accuracy with reported 1/7 on relative scale of cognitive load and no reported difficulty.      2. Patient will complete selective attention tasks (auditory or visual) with 90% accuracy independently increase selective attention.     Goal Met 9/13/2023    Auditory: met x2  Visual: met x3 Auditory Selective Attention:  Patient listened to [employer] talk video of increasing complexity/abstract concepts and answered questions about what she watched. She took notes and use paused, play, rewind as needed to facilitate attention. She answered questions with 90% accuracy and initial cueing to take notes and use video features as needed. She rated this task as 4/7 on relative scale of cognitive load. She initially was attempting to take notes on every detail of video, but then stated, "maybe I'll just listen" and listened to entire video, then repeated video to listen for specific details as needed.     Visual Selective Attention:  Patient completed visual selective attention task in which she was given a menu and asked several questions requiring her to scan menus, locate relevant information, and then complete calculation or problem solving to answer questions. She answered questions/calculations with 93% accuracy in 12/13 trials and minimal verbal cueing for navigation of menu and use of strategies to facilitate accuracy such as requesting repetition of questions to ensure she understood correctly. She demonstrated overall great improvement in use of strategies compared to previous, with requesting " help/asking questions to facilitate accuracy as needed.      3. Patient will use attention shifting strategies to shift attention between two tasks (auditory or visual) with no more than 3 cues or 90% accuracy to improve alternating attention.      Progressing/ Not Met 10/6/2023   Not formally addressed      4. Patient will complete mental manipulation tasks with 90% acc to improve working memory.        Progressing/ Not Met 10/6/2023   Not formally addressed      5. Patient will use Goal Plan Action Review strategy to complete moderate to complex reasoning, planning, or organization tasks with 90% accuracy independently to improve functional executive function skills.      Goal Met 10/6/2023   Patient participated in Goal-Plan-Do-Review (GPDR) approach in today's session. The key components of this approach are executive function and self-regulation skills -- foundational skills that help us focus, make decisions, set goals, control impulses, make and execute plans and revise and adjust them when necessary. Goal requires metacognition and working memory, Plan requires planning/prioritization, organization, time management, working memory, Do requires task initiation, response inhibition, time management, sustained attention, working memory, flexibility, organization, persistence, stress tolerance, emotional control, cognitive flexibility and Review/Revise requires metacognition, working memory, flexibility.      6. Patient will determine a list of possible solutions, given a problem scenario in a structured setting with 90% accuracy across 2 sessions.      Progressing/ Not Met 10/6/2023   Not formally addressed         7. Patient will complete a functional task to improve attention, memory and/or executive functions (I.e. sample bill paying activity, recipe, or multiple choice comprehension questions to 1 paragraphs) with 80% accuracy and natural environment noise distractions (TV news background, music, etc.).    "  Goal Met 10/6/2023    Met x2 Patient completed functional task in today's session in which she was hosting a "dinner party" and had several dinner and dessert options to choose from to fit within a certain budget. She priced each item and overall meal cost. She completed task in today's session; portion completed in today's session she completed with 85% accuracy and independent use of strategies such as highlighting relevant information, use of calculator, marking off item as she went and reducing rate all to facilitate attention. Task also discussed in conjunction with GPDR protocol for discussion of evaluation of problems preventatively to solve problems prior to them occurring.      8. Patient will be educated regarding cognitive deficits as applicable to the patient's life for increased awareness and will execute returned demonstration of information with 80% accuracy.      Goal Met 7/10/2023   Patient was educated thoroughly regarding a variety of foundational cognitive elements related to mild TBI including the cognitive triangle (with emphasis on foundational versus higher level cognitive functions, awareness of deficits, impact of attention, information processing, memory, and executive functioning deficits as each area relates to assessment findings), spoon theory to discuss cognitive versus physical versus emotional fatigue and management of each to more efficiently use energy daily for more or less cognitively demanding tasks, internal versus external distractions and management of each, as well as overall impact of these deficits on daily functioning. Patient demonstrated awareness of information provided as demonstrated by asking good questions and expressing understanding.           Patient Education/Response:   Patient educated regarding the following:  Patient was educated thoroughly regarding a variety of foundational cognitive elements related to mild TBI including the cognitive triangle (with " emphasis on foundational versus higher level cognitive functions, awareness of deficits, impact of attention, information processing, memory, and executive functioning deficits as each area relates to assessment findings), spoon theory to discuss cognitive versus physical versus emotional fatigue and management of each to more efficiently use energy daily for more or less cognitively demanding tasks, internal versus external distractions and management of each, as well as overall impact of these deficits on daily functioning. Patient demonstrated awareness of information provided as demonstrated by asking good questions and expressing understanding.    Home program established: yes-management of energy/fatigue; being mindful of distractions in the environment   Patient verbalized understanding to all above education provided.     See Electronic Medical Record under Patient Instructions for exercises provided throughout therapy.  Assessment:   Assessment of Current Status: Patient has progressed in understanding of information provided over course of therapy as well as awareness of cognitive deficits that could/are impacting cognitive functioning as well as implementing strategies to improve/facilitate attention, information, processing, memory, etc to facilitate independent completion of daily tasks. She has reached maximum rehab potential for present time and is in agreement with plan for discharge at this time. She expressed understanding of all information provided.      Goals: See progress toward goals above.     Long Term Goals:  Patient will improve attention skills to effectively attend to and communicate in complex daily living tasks in functional living environment.      Discharge reason: Patient has reached the maximum rehab potential for the present time  Plan:   This patient is discharged from Speech Therapy    Rhianna Perez MA, CCC-SLP  Speech Language Pathologist  10/6/2023

## 2023-10-06 NOTE — PROGRESS NOTES
[facility]   Physical Therapy Treatment Note + Discharge Summary       Name: Kasie Quevedo  Clinic Number: [id number]    Therapy Diagnosis:   Encounter Diagnoses   Name Primary?    Peripheral vestibulopathy, unspecified laterality Yes    Dizziness     Abnormality of gait and mobility        Physician: Jose Ramon Roland MD    Visit Date: 10/6/2023    Physician Orders: PT Eval and Treat  Medical Diagnosis from Referral: peripheral vestibulopathy   Evaluation Date: 6/15/2023  Authorization Period Expiration: 6/12/2024  Plan of Care Expiration: 9/14/2023  Visit # / Visits authorized: 8/20 (+eval)  FOTO: 1/3     Progress Note Due on 9/14/2023     Precautions: Standard    PTA Visit #: 0/5     Time In: 1014   Time Out: 1047  Total Billable Time: 28 minutes (Billing reflects 1 on 1 treatment time spent with patient)    Subjective     Patient reports: that she is feeling okay today, still up and down overall. She is still having the ringing in her ear, which is one of her biggest remaining complaints. Patient reports that she had an NASIM in her neck on Tuesday. It is still kind of numb since the procedure, but her back is hurting and unchanged. Patient is curious what she should do if ever traveling, as she is concerned this may increase her s/s. PT advised patient to discuss this with MD, but that chewing gum on airplanes tends to help. Advised that she talk to MD on if they recommend any specific patches or medications to help with s/s.     He/She was compliant with home exercise program.  Response to previous treatment: no adverse reactions  Functional change: pt does report slight improvements in turning her head since performing her exercises    Pain: 7-8/10     Location: headache, neck, shoulder, back, radicular L UE s/s    Objective      Objective Measures updated at progress report or POC update only unless otherwise noted.        Posture: Pt presents with postural abnormalities which  include: forward head and rounded shoulders                        Visual/Auditory:   Tracking/Smooth Pursuits:Impaired: improved tracking, minimal nystagmus noted, increases s/s mildly, sees double today with vertical tracking (improved with slowing jennifer)  Gaze Stabilization: Negative   Head Shake: Positive  Head Thrust: Positive - Left  Saccades: Impaired: Difficulty gazing from target to target, does not feel like target gets closer today but feels as if it is moving, unable to gaze smoothly or keep pace, corrective saccades noted, but less than last visit  Convergence: impaired, 27 cm (20cm - initial)  VOR: Impaired: Extreme difficulty keeping focus, unable to keep pace with head turns  VCR: Impaired: increases s/s, loses balance (head remains still, body moves).   Also tested seated - able to return to a neutral head position with eyes closed today         POSITIONAL CANAL TESTING -   Looking for nystagmus (slow drift to affected side with quick correction away)     Brian Hallpike (posterior / CL anterior)              Right : NT              Left: NT  Horizontal Canals              Right: NT              Left: NT        Modified VAS (Vertebral Artery Screen), in sitting (rotation, then extension):  R: Negative   L: Negative     Cervical ROM Screen:  Decreased cervical range of motion all directions, but especially left rotation and left side bending. Decreased subcranial range of motion, especially left rotation. Subcranial mobility also increases neural tension into low back and L UE, but Open End Feel with L subcranial rotation.      SPECIAL TESTS:  Sharp Lucila: Negative  Alar Ligament Test: Negative     MUSCLE LENGTH:      Muscle Tested  Right Left  Goal   Upper Trapezius  decreased decreased Normal B    Levator Scapulae  decreased decreased Normal B   Sternocleidomastoid decreased decreased Normal B   Scalenes  decreased decreased Normal B    Pectoralis Minor  decreased decreased Normal B         Gait  "Analysis: The pt presents with the following gait abnormalities: bradykinetic, increased SHAWNA, decreased step length bilateral, and decreased hip extension bilateral  10/6/2023: Patient presents with slight improvement in jennifer and step length. She still presents with increased SHAWNA when her s/s are worse, and she still ambulates with decreased hip extension bilaterally.        Treatment     Kasie received the treatments listed below:       MANUAL THERAPY TECHNIQUES were applied for (0) minutes, including:    Manual Intervention Performed Today    Soft Tissue Mobilization [] Very gentle bilateral suboccipitals, paraspinals, upper trapezius, levator scapulae     Joint Mobilizations []    Subcranial PROM [] Flex/extension, side bending, and rotation (gentle, small tolerable range)    []    Functional Dry Needling  []      Plan for Next Visit: Continue as needed         NEUROMUSCULAR RE-EDUCATION ACTIVITIES to improve Balance, Coordination, Kinesthetic, Sense, Proprioception, and Posture for (28) minutes.  The following were included:    Intervention Performed Today    Smooth Pursuits - standing  30" vertical and 30" horizontal   Difficulty tracking smoothly, minimal nystagmus noted, increases s/s mildly, moderate postural sway    Saccades  30" vertical and horizontal   Difficulty keeping pace but improved.   Colored sticky notes with letters on it, calling out letters and colors in different orders, 3 x 30" (last set on airex)   VOR x 1  30" vertical and horizontal  Moderate difficulty keeping focus, unable to keep pace with head turns   Convergence - pen pushes - standing  10x   Abnormal convergence, pt still sways backwards and requires one UE support   Visual motion sensitivity   2 x 5 each, vertical and horizontal    DLS on airex  2 x 30"   Tandem walking on airex   Down and back 4x   Standing on airex beam with rotations  3 x each side   VCR  x 5x each side   Chin tucks - supine (added)  10x   Cervical ROM - " "supine (added)  All planes, 10x each   Progress Note x Re-assessment as above in objective section   VOMs - 20" performed with each during assessment   Patient Education x Discussed keeping up with vestibular HEP per tolerance in the meantime but also following up with MD regarding remaining dizziness s/s, as well hearing impairments and ringing in her left ear. Patient expressed understanding.      Plan for Next Visit: progress to standing vestib ex if possible, cone convergence        Patient Education and Home Exercises       Home Exercises Provided and Patient Education Provided     Education provided:   PURPOSE: Patient educated on the impairments noted above and the effects of physical therapy intervention to improve overall condition and QOL.   EXERCISE: Patient was educated on all the above exercise prior/during/after for proper posture, positioning, and execution for safe performance with home exercise program.   POSTURE: Patient educated on postural awareness to reduce stress and maintain optimal alignment of the spine with static positions and dynamic movement     Written Home Exercises Provided: yes.  Exercises were reviewed and Kasie was able to demonstrate them prior to the end of the session.  Kasie demonstrated good  understanding of the education provided. See EMR under Patient Instructions for exercises provided during therapy sessions.    Assessment     Patient tolerated treatment well and has attended 9 total PT treatment sessions. Patient initially made good overall improvements with PT as compared to initial eval, demonstrating improvements in balance, gait, and vestibular exercises as well as reporting slightly less s/s. However, in recent weeks her s/s have seemed to plateau. Patient continues to report good and bad days, with her s/s fluctuating. She is still limited by neck and back pain, as well as ringing in her left ear. She is greatly affected by loud noises and busy environments, " "causing her to feel "off" due to the extra stimulus and hearing loss in left ear. Discussed importance of keeping up with HEP in the meantime, but she would benefit from reaching out to MD for further assessment and treatment options to address remaining s/s. Discussed this with patient, and she expressed understanding.     Kasie is progressing well towards her goals.   Patient prognosis is Guarded.     Patient will continue to benefit from skilled outpatient physical therapy to address the deficits listed in the problem list box on initial evaluation, provide pt/family education and to maximize patient's level of independence in the home and community environment.     Patient's spiritual, cultural and educational needs considered and pt agreeable to plan of care and goals.     Anticipated Barriers for therapy: co-morbidities, chronicity of condition, lack of understanding of condition, adherence to treatment plan, and transportation     GOALS:     Short Term Goals:  6 weeks Progress   Dizziness: Patient will report decreased dizziness, and recent s/s trend is improving in order to progress towards LTG's. PC   Function: Patient will demonstrate improved function as indicated by a functional status score of less than or equal to 54 out of 100 on FOTO. PC   Gait: Patient will demonstrate improved gait mechanics in order to improve functional mobility, improve quality of life, and decrease risk of further injury or fall.  Met  8/23/2023   HEP: Patient will demonstrate independence with HEP in order to progress toward functional independence. Met  7/21/2023   Improve postural awareness.  Met  8/23/2023         Long Term Goals:  12 weeks Progress   Dizziness:  Patient will return to normal ADL, recreational, and work related activities with less dizziness and limitation.  PC   Function: Patient will demonstrate improved function as indicated by a functional status score of less than or equal to 50 out of 100 on FOTO. PC "   Gait: Patient will demonstrate normalized gait mechanics with minimal compensation in order to return to PLOF. PC   Decrease saccades as well as positional and VOR related s/s in order for patient to be able to switch positions or look quickly to different directions without onset of dizziness.  PC      Goals Key:  PC= progressing/continue;        PM= partially met;             DC= discontinue    Plan     10/6/2023: Patient is considered DC from PT with HEP at this time and is being referred back to MD.     8/23/2023: It is my recommendation that patient continue with PT at her current frequency of 2 times per week for the remainder of her approved visits. Her treatment plan will remain the same, and she will be progressed appropriately. If s/s do not improve, she will be referred back to MD for further assessment and treatment options.     7/21/2023: It is my recommendation that patient continue with PT at her current frequency of 2 times per week for the remainder of her approved visits. Her treatment plan will remain the same, and she will be progressed appropriately.      6/15/2023 (evaluation): Outpatient Physical Therapy 1 times weekly for 12 weeks to include any combination of the following interventions: vestibular habituation exercises, dry needling, modalities, electrical stimulation (IFC, Pre-Mod, Attended with Functional Dry Needling), Cervical/Lumbar Traction, Gait Training, Manual Therapy, Neuromuscular Re-ed, Patient Education, Self Care, Therapeutic Exercise, and Therapeutic Activites     Tiffany Ryan, PT

## 2023-10-09 NOTE — PLAN OF CARE
[facility]   Physical Therapy Treatment Note + Discharge Summary       Name: Kasie Quevedo  Clinic Number: [id number]    Therapy Diagnosis:   Encounter Diagnoses   Name Primary?    Peripheral vestibulopathy, unspecified laterality Yes    Dizziness     Abnormality of gait and mobility        Physician: Jose Ramon Roland MD    Visit Date: 10/6/2023    Physician Orders: PT Eval and Treat  Medical Diagnosis from Referral: peripheral vestibulopathy   Evaluation Date: 6/15/2023  Authorization Period Expiration: 6/12/2024  Plan of Care Expiration: 9/14/2023  Visit # / Visits authorized: 8/20 (+eval)  FOTO: 1/3     Progress Note Due on 9/14/2023     Precautions: Standard    PTA Visit #: 0/5     Time In: 1014   Time Out: 1047  Total Billable Time: 28 minutes (Billing reflects 1 on 1 treatment time spent with patient)    Subjective     Patient reports: that she is feeling okay today, still up and down overall. She is still having the ringing in her ear, which is one of her biggest remaining complaints. Patient reports that she had an NASIM in her neck on Tuesday. It is still kind of numb since the procedure, but her back is hurting and unchanged. Patient is curious what she should do if ever traveling, as she is concerned this may increase her s/s. PT advised patient to discuss this with MD, but that chewing gum on airplanes tends to help. Advised that she talk to MD on if they recommend any specific patches or medications to help with s/s.     He/She was compliant with home exercise program.  Response to previous treatment: no adverse reactions  Functional change: pt does report slight improvements in turning her head since performing her exercises    Pain: 7-8/10     Location: headache, neck, shoulder, back, radicular L UE s/s    Objective      Objective Measures updated at progress report or POC update only unless otherwise noted.        Posture: Pt presents with postural abnormalities which  include: forward head and rounded shoulders                        Visual/Auditory:   Tracking/Smooth Pursuits:Impaired: improved tracking, minimal nystagmus noted, increases s/s mildly, sees double today with vertical tracking (improved with slowing jennifer)  Gaze Stabilization: Negative   Head Shake: Positive  Head Thrust: Positive - Left  Saccades: Impaired: Difficulty gazing from target to target, does not feel like target gets closer today but feels as if it is moving, unable to gaze smoothly or keep pace, corrective saccades noted, but less than last visit  Convergence: impaired, 27 cm (20cm - initial)  VOR: Impaired: Extreme difficulty keeping focus, unable to keep pace with head turns  VCR: Impaired: increases s/s, loses balance (head remains still, body moves).   Also tested seated - able to return to a neutral head position with eyes closed today         POSITIONAL CANAL TESTING -   Looking for nystagmus (slow drift to affected side with quick correction away)     Brian Hallpike (posterior / CL anterior)              Right : NT              Left: NT  Horizontal Canals              Right: NT              Left: NT        Modified VAS (Vertebral Artery Screen), in sitting (rotation, then extension):  R: Negative   L: Negative     Cervical ROM Screen:  Decreased cervical range of motion all directions, but especially left rotation and left side bending. Decreased subcranial range of motion, especially left rotation. Subcranial mobility also increases neural tension into low back and L UE, but Open End Feel with L subcranial rotation.      SPECIAL TESTS:  Sharp Lucila: Negative  Alar Ligament Test: Negative     MUSCLE LENGTH:      Muscle Tested  Right Left  Goal   Upper Trapezius  decreased decreased Normal B    Levator Scapulae  decreased decreased Normal B   Sternocleidomastoid decreased decreased Normal B   Scalenes  decreased decreased Normal B    Pectoralis Minor  decreased decreased Normal B         Gait  "Analysis: The pt presents with the following gait abnormalities: bradykinetic, increased SHAWNA, decreased step length bilateral, and decreased hip extension bilateral  10/6/2023: Patient presents with slight improvement in jennifer and step length. She still presents with increased SHAWNA when her s/s are worse, and she still ambulates with decreased hip extension bilaterally.        Treatment     Kasie received the treatments listed below:       MANUAL THERAPY TECHNIQUES were applied for (0) minutes, including:    Manual Intervention Performed Today    Soft Tissue Mobilization [] Very gentle bilateral suboccipitals, paraspinals, upper trapezius, levator scapulae     Joint Mobilizations []    Subcranial PROM [] Flex/extension, side bending, and rotation (gentle, small tolerable range)    []    Functional Dry Needling  []      Plan for Next Visit: Continue as needed         NEUROMUSCULAR RE-EDUCATION ACTIVITIES to improve Balance, Coordination, Kinesthetic, Sense, Proprioception, and Posture for (28) minutes.  The following were included:    Intervention Performed Today    Smooth Pursuits - standing  30" vertical and 30" horizontal   Difficulty tracking smoothly, minimal nystagmus noted, increases s/s mildly, moderate postural sway    Saccades  30" vertical and horizontal   Difficulty keeping pace but improved.   Colored sticky notes with letters on it, calling out letters and colors in different orders, 3 x 30" (last set on airex)   VOR x 1  30" vertical and horizontal  Moderate difficulty keeping focus, unable to keep pace with head turns   Convergence - pen pushes - standing  10x   Abnormal convergence, pt still sways backwards and requires one UE support   Visual motion sensitivity   2 x 5 each, vertical and horizontal    DLS on airex  2 x 30"   Tandem walking on airex   Down and back 4x   Standing on airex beam with rotations  3 x each side   VCR  x 5x each side   Chin tucks - supine (added)  10x   Cervical ROM - " "supine (added)  All planes, 10x each   Progress Note x Re-assessment as above in objective section   VOMs - 20" performed with each during assessment   Patient Education x Discussed keeping up with vestibular HEP per tolerance in the meantime but also following up with MD regarding remaining dizziness s/s, as well hearing impairments and ringing in her left ear. Patient expressed understanding.      Plan for Next Visit: progress to standing vestib ex if possible, cone convergence        Patient Education and Home Exercises       Home Exercises Provided and Patient Education Provided     Education provided:   PURPOSE: Patient educated on the impairments noted above and the effects of physical therapy intervention to improve overall condition and QOL.   EXERCISE: Patient was educated on all the above exercise prior/during/after for proper posture, positioning, and execution for safe performance with home exercise program.   POSTURE: Patient educated on postural awareness to reduce stress and maintain optimal alignment of the spine with static positions and dynamic movement     Written Home Exercises Provided: yes.  Exercises were reviewed and Kasie was able to demonstrate them prior to the end of the session.  Kasie demonstrated good  understanding of the education provided. See EMR under Patient Instructions for exercises provided during therapy sessions.    Assessment     Patient tolerated treatment well and has attended 9 total PT treatment sessions. Patient initially made good overall improvements with PT as compared to initial eval, demonstrating improvements in balance, gait, and vestibular exercises as well as reporting slightly less s/s. However, in recent weeks her s/s have seemed to plateau. Patient continues to report good and bad days, with her s/s fluctuating. She is still limited by neck and back pain, as well as ringing in her left ear. She is greatly affected by loud noises and busy environments, " "causing her to feel "off" due to the extra stimulus and hearing loss in left ear. Discussed importance of keeping up with HEP in the meantime, but she would benefit from reaching out to MD for further assessment and treatment options to address remaining s/s. Discussed this with patient, and she expressed understanding.     Kasie is progressing well towards her goals.   Patient prognosis is Guarded.     Patient will continue to benefit from skilled outpatient physical therapy to address the deficits listed in the problem list box on initial evaluation, provide pt/family education and to maximize patient's level of independence in the home and community environment.     Patient's spiritual, cultural and educational needs considered and pt agreeable to plan of care and goals.     Anticipated Barriers for therapy: co-morbidities, chronicity of condition, lack of understanding of condition, adherence to treatment plan, and transportation     GOALS:     Short Term Goals:  6 weeks Progress   Dizziness: Patient will report decreased dizziness, and recent s/s trend is improving in order to progress towards LTG's. PC   Function: Patient will demonstrate improved function as indicated by a functional status score of less than or equal to 54 out of 100 on FOTO. PC   Gait: Patient will demonstrate improved gait mechanics in order to improve functional mobility, improve quality of life, and decrease risk of further injury or fall.  Met  8/23/2023   HEP: Patient will demonstrate independence with HEP in order to progress toward functional independence. Met  7/21/2023   Improve postural awareness.  Met  8/23/2023         Long Term Goals:  12 weeks Progress   Dizziness:  Patient will return to normal ADL, recreational, and work related activities with less dizziness and limitation.  PC   Function: Patient will demonstrate improved function as indicated by a functional status score of less than or equal to 50 out of 100 on FOTO. PC "   Gait: Patient will demonstrate normalized gait mechanics with minimal compensation in order to return to PLOF. PC   Decrease saccades as well as positional and VOR related s/s in order for patient to be able to switch positions or look quickly to different directions without onset of dizziness.  PC      Goals Key:  PC= progressing/continue;        PM= partially met;             DC= discontinue    Plan     10/6/2023: Patient is considered DC from PT with HEP at this time and is being referred back to MD.    8/23/2023: It is my recommendation that patient continue with PT at her current frequency of 2 times per week for the remainder of her approved visits. Her treatment plan will remain the same, and she will be progressed appropriately. If s/s do not improve, she will be referred back to MD for further assessment and treatment options.     7/21/2023: It is my recommendation that patient continue with PT at her current frequency of 2 times per week for the remainder of her approved visits. Her treatment plan will remain the same, and she will be progressed appropriately.      6/15/2023 (evaluation): Outpatient Physical Therapy 1 times weekly for 12 weeks to include any combination of the following interventions: vestibular habituation exercises, dry needling, modalities, electrical stimulation (IFC, Pre-Mod, Attended with Functional Dry Needling), Cervical/Lumbar Traction, Gait Training, Manual Therapy, Neuromuscular Re-ed, Patient Education, Self Care, Therapeutic Exercise, and Therapeutic Activites     Tiffany Ryan, PT

## 2023-12-12 ENCOUNTER — HOSPITAL ENCOUNTER (OUTPATIENT)
Dept: CARDIOLOGY | Facility: HOSPITAL | Age: 53
Discharge: HOME | End: 2023-12-12
Attending: ORTHOPAEDIC SURGERY
Payer: COMMERCIAL

## 2023-12-12 DIAGNOSIS — Z79.01 LONG TERM (CURRENT) USE OF ANTICOAGULANTS: ICD-10-CM

## 2023-12-12 DIAGNOSIS — Z79.01 LONG TERM (CURRENT) USE OF ANTICOAGULANTS: Primary | ICD-10-CM

## 2023-12-12 DIAGNOSIS — M54.16 LUMBAR RADICULOPATHY: ICD-10-CM

## 2023-12-12 DIAGNOSIS — Z01.812 PRE-OPERATIVE LABORATORY EXAMINATION: ICD-10-CM

## 2023-12-12 PROCEDURE — 93010 EKG 12-LEAD: ICD-10-PCS | Mod: ,,, | Performed by: STUDENT IN AN ORGANIZED HEALTH CARE EDUCATION/TRAINING PROGRAM

## 2023-12-12 PROCEDURE — 93010 ELECTROCARDIOGRAM REPORT: CPT | Mod: ,,, | Performed by: STUDENT IN AN ORGANIZED HEALTH CARE EDUCATION/TRAINING PROGRAM

## 2023-12-12 PROCEDURE — 93005 ELECTROCARDIOGRAM TRACING: CPT | Mod: PO

## 2024-02-11 ENCOUNTER — HOSPITAL ENCOUNTER (EMERGENCY)
Facility: HOSPITAL | Age: 54
Discharge: HOME | End: 2024-02-11
Attending: EMERGENCY MEDICINE
Payer: COMMERCIAL

## 2024-02-11 VITALS
SYSTOLIC BLOOD PRESSURE: 134 MMHG | DIASTOLIC BLOOD PRESSURE: 90 MMHG | BODY MASS INDEX: 30.47 KG/M2 | HEIGHT: 66 IN | RESPIRATION RATE: 20 BRPM | OXYGEN SATURATION: 98 % | TEMPERATURE: 99 F | HEART RATE: 84 BPM | WEIGHT: 189.63 LBS

## 2024-02-11 DIAGNOSIS — J06.9 URI WITH COUGH AND CONGESTION: Primary | ICD-10-CM

## 2024-02-11 LAB
CTP QC/QA: YES
CTP QC/QA: YES
POC MOLECULAR INFLUENZA A AGN: NEGATIVE
POC MOLECULAR INFLUENZA B AGN: NEGATIVE
SARS-COV-2 RDRP RESP QL NAA+PROBE: NEGATIVE

## 2024-02-11 PROCEDURE — 87635 SARS-COV-2 COVID-19 AMP PRB: CPT | Mod: ER | Performed by: NURSE PRACTITIONER

## 2024-02-11 PROCEDURE — 87502 INFLUENZA DNA AMP PROBE: CPT | Mod: ER

## 2024-02-11 PROCEDURE — 99284 EMERGENCY DEPT VISIT MOD MDM: CPT | Mod: ER

## 2024-02-11 RX ORDER — MUPIROCIN 20 MG/G
OINTMENT TOPICAL 3 TIMES DAILY
Qty: 15 G | Refills: 0 | Status: SHIPPED | OUTPATIENT
Start: 2024-02-11 | End: 2024-02-11

## 2024-02-11 RX ORDER — ERENUMAB-AOOE 140 MG/ML
INJECTION, SOLUTION SUBCUTANEOUS
COMMUNITY
Start: 2024-01-27

## 2024-02-11 RX ORDER — ALBUTEROL SULFATE 90 UG/1
1-2 AEROSOL, METERED RESPIRATORY (INHALATION) EVERY 6 HOURS PRN
Qty: 6.7 G | Refills: 0 | Status: SHIPPED | OUTPATIENT
Start: 2024-02-11 | End: 2024-02-11

## 2024-02-11 RX ORDER — PROMETHAZINE HYDROCHLORIDE AND DEXTROMETHORPHAN HYDROBROMIDE 6.25; 15 MG/5ML; MG/5ML
5 SYRUP ORAL EVERY 6 HOURS PRN
Qty: 120 ML | Refills: 0 | Status: SHIPPED | OUTPATIENT
Start: 2024-02-11 | End: 2024-02-21

## 2024-02-11 RX ORDER — ALBUTEROL SULFATE 90 UG/1
1-2 AEROSOL, METERED RESPIRATORY (INHALATION) EVERY 6 HOURS PRN
Qty: 6.7 G | Refills: 0 | Status: SHIPPED | OUTPATIENT
Start: 2024-02-11 | End: 2025-02-10

## 2024-02-11 RX ORDER — MUPIROCIN 20 MG/G
OINTMENT TOPICAL 3 TIMES DAILY
Qty: 15 G | Refills: 0 | Status: SHIPPED | OUTPATIENT
Start: 2024-02-11

## 2024-02-11 NOTE — Clinical Note
"Kasie Lechuga" Sae was seen and treated in our emergency department on 2/11/2024.  She may return to work on 02/14/2024.       If you have any questions or concerns, please don't hesitate to call.      Rory Jain NP"

## 2024-02-14 NOTE — ED PROVIDER NOTES
Encounter Date: 2/11/2024       History     Chief Complaint   Patient presents with    Nasal Congestion     Cough, sore throat and rhinorrhea for the past several days     Patient complains of cough and congestion for several days        Review of patient's allergies indicates:   Allergen Reactions    Ace inhibitors Swelling    Hydrochlorothiazide     Sulfa (sulfonamide antibiotics) Hives    Sulfur soap      Past Medical History:   Diagnosis Date    Anxiety     CHF (congestive heart failure)     Coronary artery disease     GERD (gastroesophageal reflux disease)     Hypertension     Miocardial Infarction     2015     Past Surgical History:   Procedure Laterality Date    Aortic Dissection Repair      2015    BLADDER REPAIR      CARDIAC CATHETERIZATION      2015    CHOLECYSTECTOMY      CORONARY ANGIOPLASTY WITH STENT PLACEMENT      2015    HYSTERECTOMY      STOMACH SURGERY      Gastric sleeve     History reviewed. No pertinent family history.  Social History     Tobacco Use    Smoking status: Never    Smokeless tobacco: Never   Substance Use Topics    Alcohol use: Yes     Comment: daily, wine    Drug use: No     Review of Systems   Constitutional:  Negative for fever.   HENT:  Negative for sore throat.    Respiratory:  Negative for shortness of breath.    Cardiovascular:  Negative for chest pain.   Gastrointestinal:  Negative for nausea.   Genitourinary:  Negative for dysuria.   Musculoskeletal:  Negative for back pain.   Skin:  Negative for rash.   Neurological:  Negative for weakness.   Hematological:  Does not bruise/bleed easily.       Physical Exam     Initial Vitals [02/11/24 1222]   BP Pulse Resp Temp SpO2   (!) 134/90 84 20 99.1 °F (37.3 °C) 98 %      MAP       --         Physical Exam    Nursing note and vitals reviewed.  Constitutional: She appears well-developed and well-nourished. She is not diaphoretic. She is active.  Non-toxic appearance. No distress.   HENT:   Head: Normocephalic and atraumatic.   Eyes:  Conjunctivae are normal. Right eye exhibits no discharge. Left eye exhibits no discharge. No scleral icterus.   Neck:   Normal range of motion.  Cardiovascular:  Normal rate, regular rhythm and intact distal pulses.           No murmur heard.  Pulmonary/Chest: Breath sounds normal. No respiratory distress. She has no wheezes.   Abdominal: She exhibits no distension.   Musculoskeletal:         General: No tenderness. Normal range of motion.      Cervical back: Normal range of motion.     Neurological: She is alert and oriented to person, place, and time. No cranial nerve deficit. GCS score is 15. GCS eye subscore is 4. GCS verbal subscore is 5. GCS motor subscore is 6.   Skin: Skin is warm and dry. Capillary refill takes less than 2 seconds. No rash noted.   Psychiatric: She has a normal mood and affect. Her behavior is normal. Judgment and thought content normal.         ED Course   Procedures  Labs Reviewed   POCT INFLUENZA A/B MOLECULAR   SARS-COV-2 RDRP GENE    Narrative:     This test utilizes isothermal nucleic acid amplification technology to detect the SARS-CoV-2 RdRp nucleic acid segment. The analytical sensitivity (limit of detection) is 500 copies/swab.     A POSITIVE result is indicative of the presence of SARS-CoV-2 RNA; clinical correlation with patient history and other diagnostic information is necessary to determine patient infection status.    A NEGATIVE result means that SARS-CoV-2 nucleic acids are not present above the limit of detection. A NEGATIVE result should be treated as presumptive. It does not rule out the possibility of COVID-19 and should not be the sole basis for treatment decisions. If COVID-19 is strongly suspected based on clinical and exposure history, re-testing using an alternate molecular assay should be considered.     Commercial kits are provided by [employer].   _________________________________________________________________   The authorized Fact Sheet for  Healthcare Providers and the authorized Fact Sheet for Patients of the ID NOW COVID-19 are available on the FDA website:    https://www.fda.gov/media/255983/download      https://www.fda.gov/media/759339/download             Imaging Results    None          Medications - No data to display  Medical Decision Making  Amount and/or Complexity of Data Reviewed  Labs: ordered.    Risk  Prescription drug management.                                      Clinical Impression:  Final diagnoses:  [J06.9] URI with cough and congestion (Primary)          ED Disposition Condition    Discharge Stable          ED Prescriptions       Medication Sig Dispense Start Date End Date Auth. Provider    promethazine-dextromethorphan (PROMETHAZINE-DM) 6.25-15 mg/5 mL Syrp Take 5 mLs by mouth every 6 (six) hours as needed. 120 mL 2/11/2024 2/21/2024 Rory Jain NP    albuterol (PROVENTIL/VENTOLIN HFA) 90 mcg/actuation inhaler  (Status: Discontinued) Inhale 1-2 puffs into the lungs every 6 (six) hours as needed for Wheezing. Rescue 6.7 g 2/11/2024 2/11/2024 Rory Jain NP    albuterol (PROVENTIL/VENTOLIN HFA) 90 mcg/actuation inhaler Inhale 1-2 puffs into the lungs every 6 (six) hours as needed for Wheezing. Rescue 6.7 g 2/11/2024 2/10/2025 Rory Jain NP    mupirocin (BACTROBAN) 2 % ointment  (Status: Discontinued) Apply topically 3 (three) times daily. Apply intranasal for sores 15 g 2/11/2024 2/11/2024 Rory Jain NP    mupirocin (BACTROBAN) 2 % ointment Apply topically 3 (three) times daily. Apply intranasal for sores 15 g 2/11/2024 -- Rory Jain NP          Follow-up Information       Follow up With Specialties Details Why Contact Info    Rigo Sultana MD Family Medicine Schedule an appointment as soon as possible for a visit  As needed [id number] [facility]  [place] [address]  [phone]               Rory Jain NP  02/14/24 1151

## 2024-04-14 ENCOUNTER — HOSPITAL ENCOUNTER (EMERGENCY)
Facility: HOSPITAL | Age: 54
Discharge: HOME | End: 2024-04-14
Attending: EMERGENCY MEDICINE
Payer: COMMERCIAL

## 2024-04-14 VITALS
DIASTOLIC BLOOD PRESSURE: 63 MMHG | HEART RATE: 66 BPM | WEIGHT: 190.25 LBS | OXYGEN SATURATION: 98 % | SYSTOLIC BLOOD PRESSURE: 138 MMHG | BODY MASS INDEX: 30.58 KG/M2 | HEIGHT: 66 IN | RESPIRATION RATE: 17 BRPM | TEMPERATURE: 98 F

## 2024-04-14 DIAGNOSIS — R07.9 CHEST PAIN: ICD-10-CM

## 2024-04-14 DIAGNOSIS — R00.2 PALPITATIONS: Primary | ICD-10-CM

## 2024-04-14 LAB
ALBUMIN SERPL BCP-MCNC: 3.6 G/DL (ref 3.5–5.2)
ALP SERPL-CCNC: 72 U/L (ref 55–135)
ALT SERPL W/O P-5'-P-CCNC: 19 U/L (ref 10–44)
ANION GAP SERPL CALC-SCNC: 11 MMOL/L (ref 8–16)
AST SERPL-CCNC: 16 U/L (ref 10–40)
BASOPHILS # BLD AUTO: 0.02 K/UL (ref 0–0.2)
BASOPHILS NFR BLD: 0.5 % (ref 0–1.9)
BILIRUB SERPL-MCNC: 0.4 MG/DL (ref 0.1–1)
BILIRUB UR QL STRIP: NEGATIVE
BNP SERPL-MCNC: 57 PG/ML (ref 0–99)
BUN SERPL-MCNC: 11 MG/DL (ref 6–20)
CALCIUM SERPL-MCNC: 9 MG/DL (ref 8.7–10.5)
CHLORIDE SERPL-SCNC: 107 MMOL/L (ref 95–110)
CLARITY UR REFRACT.AUTO: CLEAR
CO2 SERPL-SCNC: 26 MMOL/L (ref 23–29)
COLOR UR AUTO: YELLOW
CREAT SERPL-MCNC: 0.8 MG/DL (ref 0.5–1.4)
DIFFERENTIAL METHOD BLD: ABNORMAL
EOSINOPHIL # BLD AUTO: 0.1 K/UL (ref 0–0.5)
EOSINOPHIL NFR BLD: 3.4 % (ref 0–8)
ERYTHROCYTE [DISTWIDTH] IN BLOOD BY AUTOMATED COUNT: 14.2 % (ref 11.5–14.5)
EST. GFR  (NO RACE VARIABLE): >60 ML/MIN/1.73 M^2
GLUCOSE SERPL-MCNC: 132 MG/DL (ref 70–110)
GLUCOSE UR QL STRIP: NEGATIVE
HCT VFR BLD AUTO: 37.2 % (ref 37–48.5)
HGB BLD-MCNC: 12.2 G/DL (ref 12–16)
HGB UR QL STRIP: ABNORMAL
IMM GRANULOCYTES # BLD AUTO: 0.01 K/UL (ref 0–0.04)
IMM GRANULOCYTES NFR BLD AUTO: 0.3 % (ref 0–0.5)
KETONES UR QL STRIP: NEGATIVE
LEUKOCYTE ESTERASE UR QL STRIP: NEGATIVE
LYMPHOCYTES # BLD AUTO: 1.5 K/UL (ref 1–4.8)
LYMPHOCYTES NFR BLD: 39.1 % (ref 18–48)
MCH RBC QN AUTO: 28.2 PG (ref 27–31)
MCHC RBC AUTO-ENTMCNC: 32.8 G/DL (ref 32–36)
MCV RBC AUTO: 86 FL (ref 82–98)
MICROSCOPIC COMMENT: ABNORMAL
MONOCYTES # BLD AUTO: 0.3 K/UL (ref 0.3–1)
MONOCYTES NFR BLD: 6.6 % (ref 4–15)
NEUTROPHILS # BLD AUTO: 1.9 K/UL (ref 1.8–7.7)
NEUTROPHILS NFR BLD: 50.1 % (ref 38–73)
NITRITE UR QL STRIP: NEGATIVE
NRBC BLD-RTO: 0 /100 WBC
OHS QRS DURATION: 106 MS
OHS QTC CALCULATION: 489 MS
PH UR STRIP: 6 [PH] (ref 5–8)
PLATELET # BLD AUTO: 191 K/UL (ref 150–450)
PMV BLD AUTO: 10.7 FL (ref 9.2–12.9)
POTASSIUM SERPL-SCNC: 3.4 MMOL/L (ref 3.5–5.1)
PROT SERPL-MCNC: 6.6 G/DL (ref 6–8.4)
PROT UR QL STRIP: NEGATIVE
RBC # BLD AUTO: 4.32 M/UL (ref 4–5.4)
RBC #/AREA URNS AUTO: 5 /HPF (ref 0–4)
SODIUM SERPL-SCNC: 144 MMOL/L (ref 136–145)
SP GR UR STRIP: 1.01 (ref 1–1.03)
TROPONIN I SERPL DL<=0.01 NG/ML-MCNC: 0.02 NG/ML (ref 0–0.03)
TROPONIN I SERPL DL<=0.01 NG/ML-MCNC: 0.02 NG/ML (ref 0–0.03)
URN SPEC COLLECT METH UR: ABNORMAL
UROBILINOGEN UR STRIP-ACNC: <2 EU/DL
WBC # BLD AUTO: 3.81 K/UL (ref 3.9–12.7)

## 2024-04-14 PROCEDURE — 81000 URINALYSIS NONAUTO W/SCOPE: CPT | Mod: ER | Performed by: EMERGENCY MEDICINE

## 2024-04-14 PROCEDURE — 25000003 PHARM REV CODE 250: Mod: ER | Performed by: EMERGENCY MEDICINE

## 2024-04-14 PROCEDURE — 80053 COMPREHEN METABOLIC PANEL: CPT | Mod: ER | Performed by: EMERGENCY MEDICINE

## 2024-04-14 PROCEDURE — 93010 ELECTROCARDIOGRAM REPORT: CPT | Mod: ,,, | Performed by: INTERNAL MEDICINE

## 2024-04-14 PROCEDURE — 96365 THER/PROPH/DIAG IV INF INIT: CPT | Mod: ER

## 2024-04-14 PROCEDURE — 96375 TX/PRO/DX INJ NEW DRUG ADDON: CPT | Mod: 59,ER

## 2024-04-14 PROCEDURE — 99285 EMERGENCY DEPT VISIT HI MDM: CPT | Mod: 25,ER

## 2024-04-14 PROCEDURE — 63600175 PHARM REV CODE 636 W HCPCS: Mod: ER | Performed by: EMERGENCY MEDICINE

## 2024-04-14 PROCEDURE — 93005 ELECTROCARDIOGRAM TRACING: CPT | Mod: ER

## 2024-04-14 PROCEDURE — 85025 COMPLETE CBC W/AUTO DIFF WBC: CPT | Mod: ER | Performed by: EMERGENCY MEDICINE

## 2024-04-14 PROCEDURE — 25500020 PHARM REV CODE 255: Mod: ER | Performed by: EMERGENCY MEDICINE

## 2024-04-14 PROCEDURE — 84484 ASSAY OF TROPONIN QUANT: CPT | Mod: ER | Performed by: EMERGENCY MEDICINE

## 2024-04-14 PROCEDURE — 83880 ASSAY OF NATRIURETIC PEPTIDE: CPT | Mod: ER | Performed by: EMERGENCY MEDICINE

## 2024-04-14 RX ORDER — ASPIRIN 325 MG
325 TABLET ORAL
Status: COMPLETED | OUTPATIENT
Start: 2024-04-14 | End: 2024-04-14

## 2024-04-14 RX ORDER — MORPHINE SULFATE 4 MG/ML
4 INJECTION, SOLUTION INTRAMUSCULAR; INTRAVENOUS
Status: COMPLETED | OUTPATIENT
Start: 2024-04-14 | End: 2024-04-14

## 2024-04-14 RX ADMIN — IOHEXOL 100 ML: 350 INJECTION, SOLUTION INTRAVENOUS at 03:04

## 2024-04-14 RX ADMIN — MORPHINE SULFATE 4 MG: 4 INJECTION INTRAVENOUS at 03:04

## 2024-04-14 RX ADMIN — ASPIRIN 325 MG: 325 TABLET ORAL at 02:04

## 2024-04-14 RX ADMIN — PROMETHAZINE HYDROCHLORIDE 12.5 MG: 25 INJECTION INTRAMUSCULAR; INTRAVENOUS at 03:04

## 2024-04-14 NOTE — ED PROVIDER NOTES
Encounter Date: 4/14/2024       History     Chief Complaint   Patient presents with    Palpitations     Pt complains of palpitations on and off for a couple weeks. Also complains of L shoulder discomfort that started PTA     The history is provided by the patient.   Palpitations   This is a recurrent problem. The current episode started several weeks ago. The problem occurs intermittently. The problem has been unchanged. The problem is associated with an unknown factor. Associated symptoms include chest pain. Pertinent negatives include no fever, no numbness, no chest pressure, no claudication, no exertional chest pressure, no near-syncope, no orthopnea, no abdominal pain, no nausea, no vomiting, no back pain, no leg pain, no lower extremity edema, no dizziness, no weakness, no cough, no shortness of breath and no sputum production. Associated symptoms comments: Left shoulder pain.     Review of patient's allergies indicates:   Allergen Reactions    Ace inhibitors Swelling    Hydrochlorothiazide     Sulfa (sulfonamide antibiotics) Hives    Sulfur soap      Past Medical History:   Diagnosis Date    Anxiety     CHF (congestive heart failure)     Coronary artery disease     GERD (gastroesophageal reflux disease)     Hypertension     Miocardial Infarction     2015     Past Surgical History:   Procedure Laterality Date    Aortic Dissection Repair      2015    BLADDER REPAIR      CARDIAC CATHETERIZATION      2015    CHOLECYSTECTOMY      CORONARY ANGIOPLASTY WITH STENT PLACEMENT      2015    HYSTERECTOMY      STOMACH SURGERY      Gastric sleeve     No family history on file.  Social History     Tobacco Use    Smoking status: Never    Smokeless tobacco: Never   Substance Use Topics    Alcohol use: Yes     Comment: daily, wine    Drug use: No     Review of Systems   Constitutional:  Negative for fever.   HENT:  Negative for sore throat.    Respiratory:  Negative for cough, sputum production and shortness of breath.     Cardiovascular:  Positive for chest pain and palpitations. Negative for orthopnea, claudication and near-syncope.   Gastrointestinal:  Negative for abdominal pain, nausea and vomiting.   Genitourinary:  Negative for dysuria.   Musculoskeletal:  Negative for back pain.   Skin:  Negative for rash.   Neurological:  Negative for dizziness, weakness and numbness.   Hematological:  Does not bruise/bleed easily.       Physical Exam     Initial Vitals [04/14/24 1320]   BP Pulse Resp Temp SpO2   (!) 179/85 97 17 98.3 °F (36.8 °C) 96 %      MAP       --         Physical Exam    Nursing note and vitals reviewed.  Constitutional: She appears well-developed and well-nourished. No distress.   HENT:   Head: Normocephalic and atraumatic.   Mouth/Throat: Oropharynx is clear and moist.   Eyes: Conjunctivae and EOM are normal. Pupils are equal, round, and reactive to light.   Neck: Neck supple.   Normal range of motion.  Cardiovascular:  Normal rate, regular rhythm and normal heart sounds.           Pulmonary/Chest: Breath sounds normal. No respiratory distress.   Abdominal: Abdomen is soft. Bowel sounds are normal. She exhibits no distension. There is no abdominal tenderness.   Musculoskeletal:         General: Normal range of motion.      Cervical back: Normal range of motion and neck supple.     Neurological: She is alert and oriented to person, place, and time. She has normal strength.   Skin: Skin is warm and dry.   Psychiatric: She has a normal mood and affect. Thought content normal.         ED Course   Procedures  Labs Reviewed   CBC W/ AUTO DIFFERENTIAL - Abnormal; Notable for the following components:       Result Value    WBC 3.81 (*)     All other components within normal limits   COMPREHENSIVE METABOLIC PANEL - Abnormal; Notable for the following components:    Potassium 3.4 (*)     Glucose 132 (*)     All other components within normal limits   URINALYSIS, REFLEX TO URINE CULTURE - Abnormal; Notable for the following  components:    Occult Blood UA 1+ (*)     All other components within normal limits    Narrative:     Specimen Source->Urine   URINALYSIS MICROSCOPIC - Abnormal; Notable for the following components:    RBC, UA 5 (*)     All other components within normal limits    Narrative:     Specimen Source->Urine   B-TYPE NATRIURETIC PEPTIDE   TROPONIN I   TROPONIN I     Results for orders placed or performed during the hospital encounter of 04/14/24   CBC Auto Differential   Result Value Ref Range    WBC 3.81 (L) 3.90 - 12.70 K/uL    RBC 4.32 4.00 - 5.40 M/uL    Hemoglobin 12.2 12.0 - 16.0 g/dL    Hematocrit 37.2 37.0 - 48.5 %    MCV 86 82 - 98 fL    MCH 28.2 27.0 - 31.0 pg    MCHC 32.8 32.0 - 36.0 g/dL    RDW 14.2 11.5 - 14.5 %    Platelets 191 150 - 450 K/uL    MPV 10.7 9.2 - 12.9 fL    Immature Granulocytes 0.3 0.0 - 0.5 %    Gran # (ANC) 1.9 1.8 - 7.7 K/uL    Immature Grans (Abs) 0.01 0.00 - 0.04 K/uL    Lymph # 1.5 1.0 - 4.8 K/uL    Mono # 0.3 0.3 - 1.0 K/uL    Eos # 0.1 0.0 - 0.5 K/uL    Baso # 0.02 0.00 - 0.20 K/uL    nRBC 0 0 /100 WBC    Gran % 50.1 38.0 - 73.0 %    Lymph % 39.1 18.0 - 48.0 %    Mono % 6.6 4.0 - 15.0 %    Eosinophil % 3.4 0.0 - 8.0 %    Basophil % 0.5 0.0 - 1.9 %    Differential Method Automated    Comprehensive Metabolic Panel   Result Value Ref Range    Sodium 144 136 - 145 mmol/L    Potassium 3.4 (L) 3.5 - 5.1 mmol/L    Chloride 107 95 - 110 mmol/L    CO2 26 23 - 29 mmol/L    Glucose 132 (H) 70 - 110 mg/dL    BUN 11 6 - 20 mg/dL    Creatinine 0.8 0.5 - 1.4 mg/dL    Calcium 9.0 8.7 - 10.5 mg/dL    Total Protein 6.6 6.0 - 8.4 g/dL    Albumin 3.6 3.5 - 5.2 g/dL    Total Bilirubin 0.4 0.1 - 1.0 mg/dL    Alkaline Phosphatase 72 55 - 135 U/L    AST 16 10 - 40 U/L    ALT 19 10 - 44 U/L    eGFR >60.0 >60 mL/min/1.73 m^2    Anion Gap 11 8 - 16 mmol/L   BNP   Result Value Ref Range    BNP 57 0 - 99 pg/mL   Troponin I   Result Value Ref Range    Troponin I 0.023 0.000 - 0.026 ng/mL   Urinalysis, Reflex to  Urine Culture Urine, Clean Catch    Specimen: Urine   Result Value Ref Range    Specimen UA Urine, Clean Catch     Color, UA Yellow Yellow, Straw, Kalli    Appearance, UA Clear Clear    pH, UA 6.0 5.0 - 8.0    Specific Gravity, UA 1.015 1.005 - 1.030    Protein, UA Negative Negative    Glucose, UA Negative Negative    Ketones, UA Negative Negative    Bilirubin (UA) Negative Negative    Occult Blood UA 1+ (A) Negative    Nitrite, UA Negative Negative    Urobilinogen, UA <2.0 <2.0 EU/dL    Leukocytes, UA Negative Negative   Urinalysis Microscopic   Result Value Ref Range    RBC, UA 5 (H) 0 - 4 /hpf    Microscopic Comment SEE COMMENT    Troponin I   Result Value Ref Range    Troponin I 0.018 0.000 - 0.026 ng/mL   EKG 12-lead   Result Value Ref Range    QRS Duration 106 ms    [facility] QTC Calculation 489 ms       EKG Readings: (Independently Interpreted)   Rhythm: Normal Sinus Rhythm. Heart Rate: 76. Ectopy: No Ectopy. ST Segments: Non-Specific ST Segment Depression. T Waves: Normal. Axis: Normal. Clinical Impression: Normal Sinus Rhythm     ECG Results              EKG 12-lead (In process)        Collection Time Result Time QRS Duration [facility] QTC Calculation    04/14/24 13:22:20 04/14/24 14:55:16 106 489                     In process by Interface, [facility] (04/14/24 14:55:22)                   Narrative:    Test Reason : R00.2,    Vent. Rate : 089 BPM     Atrial Rate : 089 BPM     P-R Int : 150 ms          QRS Dur : 106 ms      QT Int : 402 ms       P-R-T Axes : 043 -05 105 degrees     QTc Int : 489 ms    Normal sinus rhythm  Minimal voltage criteria for LVH, may be normal variant ( [place] product )  Nonspecific T wave abnormality  Abnormal ECG  When compared with ECG of 12-DEC-2023 17:01,  Nonspecific T wave abnormality no longer evident in Anterior leads    Referred By: [email or web address]   SELF           Confirmed By:                                   Imaging Results              CTA Chest Non-Coronary (PE Studies) (Final  result)  Result time 04/14/24 15:33:13      Final result by Telly Burgess III, MD (04/14/24 15:33:13)                   Impression:      No evidence of pulmonary embolism or other acute cardiopulmonary disease.      Electronically signed by: Telly Burgess MD  Date:    04/14/2024  Time:    15:33               Narrative:    EXAMINATION:  CTA CHEST NON CORONARY (PE STUDIES)    CLINICAL HISTORY:  Pulmonary embolism (PE) suspected, unknown D-dimer; chest pain, palpitations    TECHNIQUE:  Routine CT angiogram of the chest protocol performed after the IV administration of 100 mL Omnipaque 350. 3D reconstructions/reformats/MIPs obtained. All CT scans at this facility are performed  using dose modulation techniques as appropriate to performed exam including the following:  automated exposure control; adjustment of mA and/or kV according to the patients size (this includes techniques or standardized protocols for targeted exams where dose is matched to indication/reason for exam: i.e. extremities or head);  iterative reconstruction technique.    COMPARISON:  10/01/2022    FINDINGS:  There is satisfactory opacification of the pulmonary arteries. There is no evidence of pulmonary embolism.    The lungs are grossly clear without evidence of acute infiltrate, effusion, pneumothorax or other acute pulmonary disease.    No aortic aneurysm or dissection is identified. Coronary artery calcifications are noted. There is no cardiomegaly or pericardial effusion.  No pathologically enlarged lymph nodes are seen in the chest.    No acute osseous abnormality is seen. Limited images through the upper abdomen demonstrate no acute findings.                                       X-Ray Chest 1 View (Final result)  Result time 04/14/24 14:10:50      Final result by Telly Burgess III, MD (04/14/24 14:10:50)                   Impression:      No acute findings.      Electronically signed by: Telly Burgess   MD  Date:    04/14/2024  Time:    14:10               Narrative:    EXAMINATION:  XR CHEST 1 VIEW    CLINICAL HISTORY:  Palpitations    FINDINGS:  Comparison is made with the most recent prior chest x-ray.  Stable mild cardiomegaly..  The lungs appear clear of active disease. No acute appearing infiltrate, pleural effusion or pneumothorax identified.                                  5:30 PM - Counseling: Spoke with the patient and discussed todays findings, in addition to providing specific details for the plan of care and counseling regarding the diagnosis and prognosis. Questions are answered at this time. I have discussed with patient and/or family/caretaker chest pain precautions, specifically to return for worsening chest pain, shortness of breath, fever, or any concern.  I have low suspicion for cardiopulmonary, vascular, infectious, respiratory, or other emergent medical condition based on my evaluation in the ED.         Medications   morphine injection 4 mg (4 mg Intravenous Given 4/14/24 1501)   promethazine (PHENERGAN) 12.5 mg in dextrose 5 % (D5W) 50 mL IVPB (0 mg Intravenous Stopped 4/14/24 1600)   aspirin tablet 325 mg (325 mg Oral Given 4/14/24 1458)   iohexoL (OMNIPAQUE 350) injection 100 mL (100 mLs Intravenous Given 4/14/24 1514)     Medical Decision Making  DDx ACS, Arrhythmia, PE, Pneumonia,     Problems Addressed:  Chest pain: acute illness or injury  Palpitations: acute illness or injury    Amount and/or Complexity of Data Reviewed  Labs: ordered.  Radiology: ordered.  ECG/medicine tests: ordered and independent interpretation performed. Decision-making details documented in ED Course.    Risk  OTC drugs.  Prescription drug management.                                      Clinical Impression:  Final diagnoses:  [R00.2] Palpitations (Primary)  [R07.9] Chest pain          ED Disposition Condition    Discharge Stable          ED Prescriptions    None       Follow-up Information       Follow up  With Specialties Details Why Contact Info    Rigo Sultana MD Family Medicine Schedule an appointment as soon as possible for a visit   [address] [facility]  [place] [address]  [phone]      [facility] CARDIOLOGY Cardiology Schedule an appointment as soon as possible for a visit   [address] [facility] [address] [phone]             Andrew Funes MD  04/14/24 1377

## 2024-04-16 LAB
OHS QRS DURATION: 102 MS
OHS QTC CALCULATION: 479 MS

## 2024-09-03 ENCOUNTER — HOSPITAL ENCOUNTER (EMERGENCY)
Facility: HOSPITAL | Age: 54
Discharge: HOME | End: 2024-09-03
Attending: EMERGENCY MEDICINE
Payer: COMMERCIAL

## 2024-09-03 VITALS
WEIGHT: 185 LBS | SYSTOLIC BLOOD PRESSURE: 118 MMHG | OXYGEN SATURATION: 98 % | BODY MASS INDEX: 29.86 KG/M2 | TEMPERATURE: 98 F | HEART RATE: 97 BPM | RESPIRATION RATE: 18 BRPM | DIASTOLIC BLOOD PRESSURE: 67 MMHG

## 2024-09-03 DIAGNOSIS — M47.9 SPONDYLOSIS: Primary | ICD-10-CM

## 2024-09-03 DIAGNOSIS — M43.16 SPONDYLOLISTHESIS OF LUMBAR REGION: ICD-10-CM

## 2024-09-03 PROCEDURE — 96375 TX/PRO/DX INJ NEW DRUG ADDON: CPT | Mod: ER

## 2024-09-03 PROCEDURE — 96374 THER/PROPH/DIAG INJ IV PUSH: CPT | Mod: ER

## 2024-09-03 PROCEDURE — 63600175 PHARM REV CODE 636 W HCPCS: Mod: ER | Performed by: EMERGENCY MEDICINE

## 2024-09-03 PROCEDURE — 99285 EMERGENCY DEPT VISIT HI MDM: CPT | Mod: 25,ER

## 2024-09-03 RX ORDER — KETOROLAC TROMETHAMINE 10 MG/1
10 TABLET, FILM COATED ORAL EVERY 6 HOURS PRN
Qty: 20 TABLET | Refills: 0 | Status: SHIPPED | OUTPATIENT
Start: 2024-09-03 | End: 2024-09-08

## 2024-09-03 RX ORDER — KETOROLAC TROMETHAMINE 10 MG/1
10 TABLET, FILM COATED ORAL EVERY 6 HOURS PRN
Qty: 20 TABLET | Refills: 0 | Status: SHIPPED | OUTPATIENT
Start: 2024-09-03 | End: 2024-09-03

## 2024-09-03 RX ORDER — HYDROMORPHONE HYDROCHLORIDE 2 MG/ML
1 INJECTION, SOLUTION INTRAMUSCULAR; INTRAVENOUS; SUBCUTANEOUS
Status: COMPLETED | OUTPATIENT
Start: 2024-09-03 | End: 2024-09-03

## 2024-09-03 RX ORDER — ORPHENADRINE CITRATE 30 MG/ML
60 INJECTION INTRAMUSCULAR; INTRAVENOUS
Status: COMPLETED | OUTPATIENT
Start: 2024-09-03 | End: 2024-09-03

## 2024-09-03 RX ORDER — KETOROLAC TROMETHAMINE 30 MG/ML
15 INJECTION, SOLUTION INTRAMUSCULAR; INTRAVENOUS
Status: COMPLETED | OUTPATIENT
Start: 2024-09-03 | End: 2024-09-03

## 2024-09-03 RX ADMIN — HYDROMORPHONE HYDROCHLORIDE 1 MG: 2 INJECTION INTRAMUSCULAR; INTRAVENOUS; SUBCUTANEOUS at 05:09

## 2024-09-03 RX ADMIN — KETOROLAC TROMETHAMINE 15 MG: 30 INJECTION, SOLUTION INTRAMUSCULAR at 05:09

## 2024-09-03 RX ADMIN — ORPHENADRINE CITRATE 60 MG: 30 INJECTION, SOLUTION INTRAMUSCULAR; INTRAVENOUS at 05:09
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
## 2024-09-04 ENCOUNTER — HOSPITAL ENCOUNTER (EMERGENCY)
Facility: HOSPITAL | Age: 54
Discharge: HOME | End: 2024-09-04
Attending: EMERGENCY MEDICINE
Payer: COMMERCIAL

## 2024-09-04 VITALS
DIASTOLIC BLOOD PRESSURE: 60 MMHG | HEART RATE: 82 BPM | TEMPERATURE: 98 F | RESPIRATION RATE: 20 BRPM | WEIGHT: 185 LBS | BODY MASS INDEX: 29.73 KG/M2 | OXYGEN SATURATION: 97 % | SYSTOLIC BLOOD PRESSURE: 131 MMHG | HEIGHT: 66 IN

## 2024-09-04 DIAGNOSIS — M43.16 SPONDYLOLISTHESIS OF LUMBAR REGION: ICD-10-CM

## 2024-09-04 DIAGNOSIS — M47.9 SPONDYLOSIS: ICD-10-CM

## 2024-09-04 DIAGNOSIS — M54.50 ACUTE BILATERAL LOW BACK PAIN WITHOUT SCIATICA: Primary | ICD-10-CM

## 2024-09-04 PROCEDURE — 99284 EMERGENCY DEPT VISIT MOD MDM: CPT | Mod: 25,ER

## 2024-09-04 PROCEDURE — 25000003 PHARM REV CODE 250: Mod: ER | Performed by: EMERGENCY MEDICINE

## 2024-09-04 PROCEDURE — 63600175 PHARM REV CODE 636 W HCPCS: Mod: ER | Performed by: EMERGENCY MEDICINE

## 2024-09-04 PROCEDURE — 96372 THER/PROPH/DIAG INJ SC/IM: CPT | Performed by: EMERGENCY MEDICINE

## 2024-09-04 RX ORDER — HYDROCODONE BITARTRATE AND ACETAMINOPHEN 5; 325 MG/1; MG/1
1 TABLET ORAL EVERY 6 HOURS PRN
Qty: 12 TABLET | Refills: 0 | Status: SHIPPED | OUTPATIENT
Start: 2024-09-04 | End: 2024-09-04

## 2024-09-04 RX ORDER — HYDROCODONE BITARTRATE AND ACETAMINOPHEN 5; 325 MG/1; MG/1
1 TABLET ORAL EVERY 6 HOURS PRN
Qty: 12 TABLET | Refills: 0 | Status: SHIPPED | OUTPATIENT
Start: 2024-09-04 | End: 2024-09-07

## 2024-09-04 RX ORDER — HYDROMORPHONE HYDROCHLORIDE 2 MG/ML
1 INJECTION, SOLUTION INTRAMUSCULAR; INTRAVENOUS; SUBCUTANEOUS
Status: COMPLETED | OUTPATIENT
Start: 2024-09-04 | End: 2024-09-04

## 2024-09-04 RX ORDER — KETOROLAC TROMETHAMINE 10 MG/1
10 TABLET, FILM COATED ORAL
Status: COMPLETED | OUTPATIENT
Start: 2024-09-04 | End: 2024-09-04

## 2024-09-04 RX ADMIN — KETOROLAC TROMETHAMINE 10 MG: 10 TABLET, FILM COATED ORAL at 02:09

## 2024-09-04 RX ADMIN — HYDROMORPHONE HYDROCHLORIDE 1 MG: 2 INJECTION INTRAMUSCULAR; INTRAVENOUS; SUBCUTANEOUS at 12:09

## 2024-09-05 NOTE — ED PROVIDER NOTES
ED Provider Note - 9/4/2024    History     Chief Complaint   Patient presents with    Back Pain     Lower back pain, seen today for same problem     Patient returns with recurrent back pain having been seen in the department about 7 or 8 hours ago.  Patient notes that after her medication wore off, her symptoms returned including pain and muscle spasm.  Patient notes that she was unable to acquire her prescription after discharge due to pharmacy closure.  See my earlier provider note for further details.      Review of patient's allergies indicates:   Allergen Reactions    Ace inhibitors Swelling    Hydrochlorothiazide     Sulfa (sulfonamide antibiotics) Hives    Sulfur soap      Past Medical History:   Diagnosis Date    Anxiety     CHF (congestive heart failure)     Coronary artery disease     GERD (gastroesophageal reflux disease)     Hypertension     Miocardial Infarction     2015     Past Surgical History:   Procedure Laterality Date    Aortic Dissection Repair      2015    BLADDER REPAIR      CARDIAC CATHETERIZATION      2015    CHOLECYSTECTOMY      CORONARY ANGIOPLASTY WITH STENT PLACEMENT      2015    HYSTERECTOMY      STOMACH SURGERY      Gastric sleeve     No family history on file.  Social History     Tobacco Use    Smoking status: Never    Smokeless tobacco: Never   Substance Use Topics    Alcohol use: Yes     Comment: daily, wine    Drug use: No     Review of Systems   Constitutional:  Negative for chills and fever.   HENT:  Negative for congestion and rhinorrhea.    Respiratory:  Negative for cough and shortness of breath.    Cardiovascular:  Negative for chest pain and palpitations.   Gastrointestinal:  Negative for abdominal pain, diarrhea and vomiting.   Genitourinary:  Negative for difficulty urinating and dysuria.   Musculoskeletal:  Positive for back pain.   Skin:  Negative for color change and rash.   Neurological:  Negative for dizziness and light-headedness.   Hematological:  Negative for  "adenopathy. Does not bruise/bleed easily.     Physical Exam     Initial Vitals [09/04/24 0044]   BP Pulse Resp Temp SpO2   (!) 143/83 80 20 98.4 °F (36.9 °C) 98 %      MAP       --         Vitals:    09/04/24 0044 09/04/24 0058 09/04/24 0115 09/04/24 0128   BP: (!) 143/83  132/65    Pulse: 80  78 75   Resp: 20 (!) 24 (!) 22 20   Temp: 98.4 °F (36.9 °C)      TempSrc: Oral      SpO2: 98%  97%    Weight: 83.9 kg (185 lb)      Height: 5' 6" (1.676 m)       09/04/24 0213   BP: 131/60   Pulse: 82   Resp: 20   Temp:    TempSrc:    SpO2: 97%   Weight:    Height:      Physical Exam    Nursing note and vitals reviewed.  Constitutional: She appears well-developed and well-nourished. She is not diaphoretic. No distress.   HENT:   Head: Normocephalic and atraumatic.   Nose: Nose normal.   Mouth/Throat: Oropharynx is clear and moist.   Eyes: Conjunctivae are normal. No scleral icterus.   Cardiovascular:  Normal rate, regular rhythm and intact distal pulses.           Pulmonary/Chest: No respiratory distress.   Musculoskeletal:         General: No edema. Normal range of motion.        Back:         Legs:      Neurological: She is alert and oriented to person, place, and time.   Skin: Skin is warm and dry.         ED Course   Procedures                   MDM      LABS     Labs Reviewed - No data to display             Imaging     Imaging Results    None                EKG        ED Management/Discussion     Medications   HYDROmorphone (PF) injection 1 mg (1 mg Intramuscular Given 9/4/24 0058)   ketorolac tablet 10 mg (10 mg Oral Given 9/4/24 0212)                 The patient's list of active medical problems, social history, medications, and allergies as documented per RN staff has been reviewed.               On final assessment, the patient appears suitable for discharge.  We have adde a Rx for Norco t the regimen previously prescribed.  I see no indication of an emergent process beyond that addressed during our encounter but " have duly counseled the patient/family regarding the need for prompt follow-up as well as the indications that should prompt immediate return to the emergency room.  The patient/family has been provided with language -specific verbal and printed direction regarding our final diagnosis(es) as well as instructions regarding use of OTC and/or Rx medications intended to manage the patient's aforementioned conditions including:  ED Prescriptions       Medication Sig Dispense Start Date End Date Auth. Provider    HYDROcodone-acetaminophen (NORCO) 5-325 mg per tablet Take 1 tablet by mouth every 6 (six) hours as needed for Pain. 12 tablet 9/4/2024 9/7/2024 Adarsh Manzo MD              Patient has been advised of the following recommended follow-up instructions:  Follow-up Information       Follow up With Specialties Details Why Contact Info    Rigo Sultana MD Family Medicine Schedule an appointment as soon as possible for a visit  for reassessment [address] [facility] [address] [phone]      [facility] - Emergency Dept Emergency Medicine Go to  As needed, If symptoms worsen [address]  Emergency Department  [facility] [id number] [phone]          The patient/family communicates understanding of all this information and all remaining questions and concerns were addressed at this time.      Referrals:  No orders of the defined types were placed in this encounter.      CLINICAL IMPRESSION    ICD-10-CM ICD-9-CM   1. Acute bilateral low back pain without sciatica  M54.50 724.2     338.19   2. Spondylosis  M47.9 721.90   3. Spondylolisthesis of lumbar region  M43.16 738.4          ED Disposition Condition    Discharge Stable                 Stanislav Cheng MD  09/04/24 1717

## 2024-10-12 ENCOUNTER — HOSPITAL ENCOUNTER (EMERGENCY)
Facility: HOSPITAL | Age: 54
Discharge: HOME | End: 2024-10-12
Attending: EMERGENCY MEDICINE
Payer: COMMERCIAL

## 2024-10-12 VITALS
TEMPERATURE: 98 F | SYSTOLIC BLOOD PRESSURE: 138 MMHG | BODY MASS INDEX: 30.62 KG/M2 | HEART RATE: 84 BPM | DIASTOLIC BLOOD PRESSURE: 82 MMHG | HEIGHT: 66 IN | WEIGHT: 190.5 LBS | RESPIRATION RATE: 17 BRPM | OXYGEN SATURATION: 98 %

## 2024-10-12 DIAGNOSIS — R59.0 POSTAURICULAR ADENOPATHY: Primary | ICD-10-CM

## 2024-10-12 PROCEDURE — 99284 EMERGENCY DEPT VISIT MOD MDM: CPT | Mod: ER

## 2024-10-12 PROCEDURE — 25000003 PHARM REV CODE 250: Mod: ER | Performed by: EMERGENCY MEDICINE

## 2024-10-12 RX ORDER — AMOXICILLIN AND CLAVULANATE POTASSIUM 875; 125 MG/1; MG/1
1 TABLET, FILM COATED ORAL 2 TIMES DAILY
Qty: 14 TABLET | Refills: 0 | Status: SHIPPED | OUTPATIENT
Start: 2024-10-12

## 2024-10-12 RX ORDER — AMOXICILLIN AND CLAVULANATE POTASSIUM 875; 125 MG/1; MG/1
1 TABLET, FILM COATED ORAL
Status: COMPLETED | OUTPATIENT
Start: 2024-10-12 | End: 2024-10-12

## 2024-10-12 RX ORDER — NAPROXEN 500 MG/1
500 TABLET ORAL 2 TIMES DAILY WITH MEALS
Qty: 60 TABLET | Refills: 0 | Status: SHIPPED | OUTPATIENT
Start: 2024-10-12

## 2024-10-12 RX ORDER — NAPROXEN 500 MG/1
500 TABLET ORAL
Status: COMPLETED | OUTPATIENT
Start: 2024-10-12 | End: 2024-10-12

## 2024-10-12 RX ADMIN — AMOXICILLIN AND CLAVULANATE POTASSIUM 1 TABLET: 875; 125 TABLET, FILM COATED ORAL at 12:10

## 2024-10-12 RX ADMIN — NAPROXEN 500 MG: 500 TABLET ORAL at 12:10

## 2024-10-12 NOTE — ED PROVIDER NOTES
Encounter Date: 10/12/2024       History     Chief Complaint   Patient presents with    Pain/Redness behind Ear     Started 3 days ago      The history is provided by the patient.   Pt reports painful area behind left ear for several days. Denies fever, earache.    Review of patient's allergies indicates:   Allergen Reactions    Ace inhibitors Swelling    Hydrochlorothiazide     Sulfa (sulfonamide antibiotics) Hives    Sulfur soap      Past Medical History:   Diagnosis Date    Anxiety     CHF (congestive heart failure)     Coronary artery disease     GERD (gastroesophageal reflux disease)     Hypertension     Miocardial Infarction     2015     Past Surgical History:   Procedure Laterality Date    Aortic Dissection Repair      2015    BLADDER REPAIR      CARDIAC CATHETERIZATION      2015    CHOLECYSTECTOMY      CORONARY ANGIOPLASTY WITH STENT PLACEMENT      2015    HYSTERECTOMY      STOMACH SURGERY      Gastric sleeve     No family history on file.  Social History     Tobacco Use    Smoking status: Never    Smokeless tobacco: Never   Substance Use Topics    Alcohol use: Yes     Comment: daily, wine    Drug use: No     Review of Systems   Constitutional:  Negative for fever.   HENT:  Negative for sore throat.    Respiratory:  Negative for shortness of breath.    Cardiovascular:  Negative for chest pain.   Gastrointestinal:  Negative for nausea.   Genitourinary:  Negative for dysuria.   Musculoskeletal:  Negative for back pain.   Skin:  Negative for rash.   Neurological:  Negative for weakness.   Hematological:  Does not bruise/bleed easily.       Physical Exam     Initial Vitals [10/12/24 0048]   BP Pulse Resp Temp SpO2   138/82 84 17 98.3 °F (36.8 °C) 98 %      MAP       --         Physical Exam    Nursing note and vitals reviewed.  Constitutional: She appears well-developed and well-nourished. No distress.   HENT:   Head: Normocephalic and atraumatic.   Right Ear: Ear canal normal. No mastoid tenderness. Tympanic  membrane is not erythematous.   Left Ear: Ear canal normal. No mastoid tenderness. Tympanic membrane is not erythematous. Mouth/Throat: Oropharynx is clear and moist.   Left postauricular ttp 1cm erythematous nodule neg fluctuance / induration. No mastoid involvement   Eyes: Conjunctivae and EOM are normal. Pupils are equal, round, and reactive to light.   Neck: Neck supple.   Normal range of motion.  Cardiovascular:  Normal rate, regular rhythm and normal heart sounds.           Pulmonary/Chest: Breath sounds normal. No respiratory distress.   Abdominal: Abdomen is soft. Bowel sounds are normal. She exhibits no distension. There is no abdominal tenderness.   Musculoskeletal:         General: Normal range of motion.      Cervical back: Normal range of motion and neck supple.     Neurological: She is alert and oriented to person, place, and time. She has normal strength.   Skin: Skin is warm and dry.   Psychiatric: She has a normal mood and affect. Thought content normal.         ED Course   Procedures  Labs Reviewed - No data to display       Imaging Results    None          Medications   amoxicillin-clavulanate 875-125mg per tablet 1 tablet (has no administration in time range)   naproxen tablet 500 mg (has no administration in time range)     Medical Decision Making  DDX Otitis media, Folliculitis, Adenopathy, Mastoiditis    Problems Addressed:  Postauricular adenopathy: acute illness or injury    Risk  Prescription drug management.    1:01 AM - Counseling: Spoke with the patient and discussed todays findings, in addition to providing specific details for the plan of care and counseling regarding the diagnosis and prognosis. Questions are answered at this time. Tender node behind left ear, no om/mastoiditis, Rx Aug close follow up                                    Clinical Impression:  Final diagnoses:  [R59.0] Postauricular adenopathy (Primary)          ED Disposition Condition    Discharge Stable          ED  Prescriptions       Medication Sig Dispense Start Date End Date Auth. Provider    amoxicillin-clavulanate 875-125mg (AUGMENTIN) 875-125 mg per tablet Take 1 tablet by mouth 2 (two) times daily. 14 tablet 10/12/2024 -- Andrew Funes MD    naproxen (NAPROSYN) 500 MG tablet Take 1 tablet (500 mg total) by mouth 2 (two) times daily with meals. 60 tablet 10/12/2024 -- Andrew Funes MD          Follow-up Information       Follow up With Specialties Details Why Contact Info    Rigo Sultana MD Family Medicine Schedule an appointment as soon as possible for a visit in 2 days  [address] [facility]  [place] [address]  [phone]      [facility] - Emergency Dept Emergency Medicine  As needed, If symptoms worsen [address]  Emergency Department  [facility] [id number] [phone]             Andrew Funes MD  10/12/24 0102

## 2024-10-15 ENCOUNTER — HOSPITAL ENCOUNTER (EMERGENCY)
Facility: HOSPITAL | Age: 54
Discharge: HOME | End: 2024-10-15
Attending: EMERGENCY MEDICINE
Payer: COMMERCIAL

## 2024-10-15 VITALS
BODY MASS INDEX: 30.34 KG/M2 | HEIGHT: 66 IN | DIASTOLIC BLOOD PRESSURE: 84 MMHG | SYSTOLIC BLOOD PRESSURE: 149 MMHG | TEMPERATURE: 98 F | HEART RATE: 79 BPM | OXYGEN SATURATION: 97 % | RESPIRATION RATE: 18 BRPM | WEIGHT: 188.81 LBS

## 2024-10-15 DIAGNOSIS — L03.811 CELLULITIS OF HEAD EXCEPT FACE: ICD-10-CM

## 2024-10-15 DIAGNOSIS — R59.0 POSTAURICULAR LYMPHADENOPATHY: Primary | ICD-10-CM

## 2024-10-15 PROCEDURE — 99284 EMERGENCY DEPT VISIT MOD MDM: CPT | Mod: ER

## 2024-10-15 PROCEDURE — 25000003 PHARM REV CODE 250: Mod: ER | Performed by: EMERGENCY MEDICINE

## 2024-10-15 RX ORDER — DOXYCYCLINE HYCLATE 100 MG
100 TABLET ORAL
Status: COMPLETED | OUTPATIENT
Start: 2024-10-15 | End: 2024-10-15

## 2024-10-15 RX ORDER — DOXYCYCLINE 100 MG/1
100 CAPSULE ORAL 2 TIMES DAILY
Qty: 14 CAPSULE | Refills: 0 | Status: SHIPPED | OUTPATIENT
Start: 2024-10-15 | End: 2024-10-22

## 2024-10-15 RX ORDER — MUPIROCIN 20 MG/G
OINTMENT TOPICAL 3 TIMES DAILY
Qty: 15 G | Refills: 0 | Status: SHIPPED | OUTPATIENT
Start: 2024-10-15

## 2024-10-15 RX ADMIN — DOXYCYCLINE HYCLATE 100 MG: 100 TABLET, COATED ORAL at 11:10

## 2025-02-07 ENCOUNTER — HOSPITAL ENCOUNTER (EMERGENCY)
Facility: HOSPITAL | Age: 55
Discharge: HOME | End: 2025-02-07
Attending: EMERGENCY MEDICINE
Payer: COMMERCIAL

## 2025-02-07 VITALS
RESPIRATION RATE: 20 BRPM | SYSTOLIC BLOOD PRESSURE: 149 MMHG | HEIGHT: 66 IN | BODY MASS INDEX: 30.7 KG/M2 | DIASTOLIC BLOOD PRESSURE: 82 MMHG | OXYGEN SATURATION: 98 % | HEART RATE: 97 BPM | WEIGHT: 191 LBS | TEMPERATURE: 98 F

## 2025-02-07 DIAGNOSIS — J40 BRONCHITIS: Primary | ICD-10-CM

## 2025-02-07 PROCEDURE — 87502 INFLUENZA DNA AMP PROBE: CPT | Mod: ER

## 2025-02-07 PROCEDURE — 99283 EMERGENCY DEPT VISIT LOW MDM: CPT | Mod: ER

## 2025-02-07 PROCEDURE — 87635 SARS-COV-2 COVID-19 AMP PRB: CPT | Mod: ER | Performed by: EMERGENCY MEDICINE

## 2025-02-07 RX ORDER — FLUTICASONE PROPIONATE 50 MCG
1 SPRAY, SUSPENSION (ML) NASAL 2 TIMES DAILY PRN
Qty: 15 G | Refills: 0 | Status: SHIPPED | OUTPATIENT
Start: 2025-02-07

## 2025-02-07 RX ORDER — PROMETHAZINE HYDROCHLORIDE AND DEXTROMETHORPHAN HYDROBROMIDE 6.25; 15 MG/5ML; MG/5ML
5 SYRUP ORAL EVERY 4 HOURS PRN
Qty: 118 ML | Refills: 0 | Status: SHIPPED | OUTPATIENT
Start: 2025-02-07 | End: 2025-02-17

## 2025-02-07 NOTE — ED PROVIDER NOTES
Encounter Date: 2/7/2025       History     Chief Complaint   Patient presents with    Cough     Cough and nasal congestion x3-4 days.      The history is provided by the patient.   Cough  This is a new problem. The current episode started several days ago. The problem occurs hourly. The problem has been unchanged. The cough is Non-productive. There has been no fever. Associated symptoms include rhinorrhea and myalgias. Pertinent negatives include no chest pain, no chills, no headaches, no sore throat, no shortness of breath and no wheezing.     Review of patient's allergies indicates:   Allergen Reactions    Ace inhibitors Swelling    Hydrochlorothiazide     Sulfa (sulfonamide antibiotics) Hives    Sulfur soap      Past Medical History:   Diagnosis Date    Anxiety     CHF (congestive heart failure)     Coronary artery disease     GERD (gastroesophageal reflux disease)     Hypertension     Miocardial Infarction     2015     Past Surgical History:   Procedure Laterality Date    Aortic Dissection Repair      2015    BLADDER REPAIR      CARDIAC CATHETERIZATION      2015    CHOLECYSTECTOMY      CORONARY ANGIOPLASTY WITH STENT PLACEMENT      2015    HYSTERECTOMY      STOMACH SURGERY      Gastric sleeve     No family history on file.  Social History     Tobacco Use    Smoking status: Never    Smokeless tobacco: Never   Substance Use Topics    Alcohol use: Yes     Comment: daily, wine    Drug use: No     Review of Systems   Constitutional:  Negative for chills and fever.   HENT:  Positive for congestion and rhinorrhea. Negative for sore throat.    Respiratory:  Positive for cough. Negative for shortness of breath and wheezing.    Cardiovascular:  Negative for chest pain.   Gastrointestinal:  Negative for nausea.   Genitourinary:  Negative for dysuria.   Musculoskeletal:  Positive for myalgias. Negative for back pain.   Skin:  Negative for rash.   Neurological:  Negative for weakness and headaches.   Hematological:  Does not  bruise/bleed easily.       Physical Exam     Initial Vitals [02/07/25 0421]   BP Pulse Resp Temp SpO2   (!) 149/82 97 20 98 °F (36.7 °C) 98 %      MAP       --         Physical Exam    Nursing note and vitals reviewed.  Constitutional: She appears well-developed and well-nourished. No distress.   HENT:   Head: Normocephalic and atraumatic.   Nose: Mucosal edema and rhinorrhea present. Mouth/Throat: Oropharynx is clear and moist.   Eyes: Conjunctivae and EOM are normal. Pupils are equal, round, and reactive to light.   Neck: Neck supple.   Normal range of motion.  Cardiovascular:  Normal rate, regular rhythm and normal heart sounds.           Pulmonary/Chest: Breath sounds normal. No respiratory distress.   Abdominal: Abdomen is soft. Bowel sounds are normal. She exhibits no distension. There is no abdominal tenderness.   Musculoskeletal:         General: Normal range of motion.      Cervical back: Normal range of motion and neck supple.     Neurological: She is alert and oriented to person, place, and time. She has normal strength.   Skin: Skin is warm and dry.   Psychiatric: She has a normal mood and affect. Thought content normal.         ED Course   Procedures  Labs Reviewed   HEPATITIS C ANTIBODY   HEP C VIRUS HOLD SPECIMEN   HIV 1 / 2 ANTIBODY   POCT INFLUENZA A/B MOLECULAR       Result Value    POC Molecular Influenza A Ag Negative      POC Molecular Influenza B Ag Negative       Acceptable Yes     SARS-COV-2 RDRP GENE    POC Rapid COVID Negative       Acceptable Yes            Imaging Results    None          Medications - No data to display  Medical Decision Making  DDx URI, Covid, Flu    Problems Addressed:  Bronchitis: acute illness or injury    Amount and/or Complexity of Data Reviewed  Labs: ordered.    Risk  Prescription drug management.    4:48 AM - Counseling: Spoke with the patient and discussed todays findings, in addition to providing specific details for the plan of  care and counseling regarding the diagnosis and prognosis. Questions are answered at this time.                                     Clinical Impression:  Final diagnoses:  [J40] Bronchitis (Primary)          ED Disposition Condition    Discharge Stable          ED Prescriptions       Medication Sig Dispense Start Date End Date Auth. Provider    promethazine-dextromethorphan (PROMETHAZINE-DM) 6.25-15 mg/5 mL Syrp Take 5 mLs by mouth every 4 (four) hours as needed. 118 mL 2/7/2025 2/17/2025 Andrew Funes MD    fluticasone propionate (FLONASE) 50 mcg/actuation nasal spray 1 spray (50 mcg total) by Each Nostril route 2 (two) times daily as needed. 15 g 2/7/2025 -- Andrew Funes MD          Follow-up Information       Follow up With Specialties Details Why Contact Info    Rigo Sultana MD Family Medicine Schedule an appointment as soon as possible for a visit   [address] [facility] [address]  [phone]               Andrew Funes MD  02/07/25 0448

## 2025-02-07 NOTE — ED NOTES
Patient examined, evaluated, and educated on discharge prescriptions and instructions by MD Shantel without RN assistance. Patient discharged to [facility] by MD.

## 2025-02-09 ENCOUNTER — HOSPITAL ENCOUNTER (EMERGENCY)
Facility: HOSPITAL | Age: 55
Discharge: HOME | End: 2025-02-09
Attending: EMERGENCY MEDICINE
Payer: COMMERCIAL

## 2025-02-09 VITALS
TEMPERATURE: 98 F | WEIGHT: 191.81 LBS | OXYGEN SATURATION: 99 % | HEIGHT: 66 IN | RESPIRATION RATE: 20 BRPM | HEART RATE: 73 BPM | DIASTOLIC BLOOD PRESSURE: 98 MMHG | BODY MASS INDEX: 30.82 KG/M2 | SYSTOLIC BLOOD PRESSURE: 138 MMHG

## 2025-02-09 DIAGNOSIS — I80.01 THROMBOPHLEBITIS OF SUPERFICIAL VEINS OF RIGHT LOWER EXTREMITY: Primary | ICD-10-CM

## 2025-02-09 PROCEDURE — 99282 EMERGENCY DEPT VISIT SF MDM: CPT | Mod: ER

## 2025-02-09 NOTE — DISCHARGE INSTRUCTIONS
As discussed, you have a mild case of superficial thrombophlebitis of the right lower leg.      Use a heating pad to this area as often as practical, continue current medications, and show this to your doctor tomorrow; they may wish to do a lower extremity ultrasound to rule out deeper clot.      This should not interfere with your ablation procedure as scheduled tomorrow.

## 2025-02-09 NOTE — ED PROVIDER NOTES
"Encounter Date: 2/9/2025       History     Chief Complaint   Patient presents with    Leg Pain     C/o bruising to R calf that is "spreading", pt reports previous multiple dissections, MI in 2015, scheduled for an ablation Monday. Recent diagnosis of bronchitis.     Complex history, reviewed, she has a cardiac ablation procedure scheduled tomorrow for tachy dysrhythmia, continues on chronic aspirin, Plavix, gabapentin, Motrin, and Flexeril among others.  She now has some tenderness and redness with a slight impression of bruising on the right medial calf for the last several days, it seems to be spreading some, no fever or chills, no history of DVT.  No chest pain, dyspnea, hemoptysis, pleurisy, or fever.  She still has some mild cough related to a recent bronchitis.  No other complaints.    The history is provided by the patient. No  was used.     Review of patient's allergies indicates:   Allergen Reactions    Ace inhibitors Swelling    Hydrochlorothiazide     Sulfa (sulfonamide antibiotics) Hives    Sulfur soap      Past Medical History:   Diagnosis Date    Anxiety     CHF (congestive heart failure)     Coronary artery disease     GERD (gastroesophageal reflux disease)     Hypertension     Miocardial Infarction     2015     Past Surgical History:   Procedure Laterality Date    Aortic Dissection Repair      2015    BLADDER REPAIR      CARDIAC CATHETERIZATION      2015    CHOLECYSTECTOMY      CORONARY ANGIOPLASTY WITH STENT PLACEMENT      2015    HYSTERECTOMY      STOMACH SURGERY      Gastric sleeve     No family history on file.  Social History     Tobacco Use    Smoking status: Never    Smokeless tobacco: Never   Substance Use Topics    Alcohol use: Yes     Comment: daily, wine    Drug use: No     Review of Systems   Skin:  Positive for color change.       Physical Exam     Initial Vitals [02/09/25 0256]   BP Pulse Resp Temp SpO2   (!) 138/98 (!) 123 20 98.3 °F (36.8 °C) 97 %      MAP       -- "         Physical Exam    Nursing note and vitals reviewed.  Constitutional: She appears well-developed and well-nourished. No distress.   HENT:   Head: Normocephalic and atraumatic.   Eyes: EOM are normal. Pupils are equal, round, and reactive to light.   Cardiovascular:  Normal rate, regular rhythm and normal heart sounds.           Heart rate noted 123 in triage, 80 and regular at the time of my exam   Pulmonary/Chest: Breath sounds normal. No respiratory distress.   Abdominal: Abdomen is soft. Bowel sounds are normal.   Musculoskeletal:         General: No tenderness. Normal range of motion.     Neurological: She is alert and oriented to person, place, and time. She has normal strength.   Skin: Skin is warm. There is erythema.   Patchy areas classic for superficial thrombophlebitis, right medial calf.  No other findings.         ED Course   Procedures  Labs Reviewed   HEPATITIS C ANTIBODY   HEP C VIRUS HOLD SPECIMEN   HIV 1 / 2 ANTIBODY          Imaging Results    None          Medications - No data to display      3:18 AM D/C instructions:      As discussed, you have a mild case of superficial thrombophlebitis of the right lower leg.      Use a heating pad to this area as often as practical, continue current medications, and show this to your doctor tomorrow; they may wish to do a lower extremity ultrasound to rule out deeper clot.      This should not interfere with your ablation procedure as scheduled tomorrow.        Medical Decision Making                                    Clinical Impression:  Final diagnoses:  [I80.01] Thrombophlebitis of superficial veins of right lower extremity (Primary)          ED Disposition Condition    Discharge Stable          ED Prescriptions    None       Follow-up Information       Follow up With Specialties Details Why Contact Info    [facility] Emergency Dept Emergency Medicine  As needed [address]  Emergency Department  [facility] [id number] [phone]     Rigo Sultana MD Family Medicine  As needed [address] [facility]  [place] [address]  [phone]               Telly Ward MD  02/09/25 0322

## 2025-02-26 NOTE — PROGRESS NOTES
See Initial Evaluation in Plan of Care.   NEPHROLOGY FOLLOW UP NOTE    BP's fair  on and off bipap      PAST MEDICAL & SURGICAL HISTORY:    Allergies:  penicillins (Unknown)  sulfa drugs (Unknown)    Home Medications Reviewed    SOCIAL HISTORY:  Denies ETOH,Smoking,   FAMILY HISTORY:        REVIEW OF SYSTEMS:  All other review of systems is negative unless indicated above.    PHYSICAL EXAM:  Constitutional: NAD  HEENT: anicteric sclera, oropharynx clear, MMM  Neck: No JVD  Respiratory: decreased BS b/l  Cardiovascular: S1, S2, RRR  Gastrointestinal: BS+, soft, NT/ND  Extremities: No cyanosis or clubbing. No peripheral edema  Neurological: A/O x 3, no focal deficits  Psychiatric: Normal mood, normal affect  : No CVA tenderness. No willett.   Skin: No rashes    Hospital Medications:   MEDICATIONS  (STANDING):  albuterol/ipratropium for Nebulization 3 milliLiter(s) Nebulizer every 6 hours  chlorhexidine 2% Cloths 1 Application(s) Topical daily  enoxaparin Injectable 30 milliGRAM(s) SubCutaneous every 24 hours  fluticasone propionate/ salmeterol 500-50 MICROgram(s) Diskus 1 Dose(s) Inhalation two times a day  pantoprazole  Injectable 40 milliGRAM(s) IV Push daily  predniSONE   Tablet 40 milliGRAM(s) Oral daily  tiotropium 2.5 MICROgram(s) Inhaler 2 Puff(s) Inhalation daily        VITALS:  T(F): 98 (02-26-25 @ 11:32), Max: 98 (02-26-25 @ 11:32)  HR: 95 (02-26-25 @ 11:32)  BP: 115/73 (02-26-25 @ 11:32)  RR: 18 (02-26-25 @ 11:32)  SpO2: 93% (02-26-25 @ 11:32)  Wt(kg): --    02-24 @ 07:01  -  02-25 @ 07:00  --------------------------------------------------------  IN: 1200 mL / OUT: 450 mL / NET: 750 mL    02-25 @ 07:01  -  02-26 @ 07:00  --------------------------------------------------------  IN: 0 mL / OUT: 650 mL / NET: -650 mL    02-26 @ 07:01  -  02-26 @ 15:02  --------------------------------------------------------  IN: 0 mL / OUT: 400 mL / NET: -400 mL          LABS:  02-26    140  |  96[L]  |  30[H]  ----------------------------<  84  4.5   |  35[H]  |  1.3    Ca    8.4      26 Feb 2025 06:13  Mg     2.1     02-26    TPro  6.2  /  Alb  3.6  /  TBili  0.3  /  DBili      /  AST  18  /  ALT  17  /  AlkPhos  73  02-26                          12.4   12.38 )-----------( 266      ( 26 Feb 2025 06:13 )             40.9       Urine Studies:  Urinalysis Basic - ( 26 Feb 2025 06:13 )    Color:  / Appearance:  / SG:  / pH:   Gluc: 84 mg/dL / Ketone:   / Bili:  / Urobili:    Blood:  / Protein:  / Nitrite:    Leuk Esterase:  / RBC:  / WBC    Sq Epi:  / Non Sq Epi:  / Bacteria:           RADIOLOGY & ADDITIONAL STUDIES:

## 2025-06-07 ENCOUNTER — HOSPITAL ENCOUNTER (EMERGENCY)
Facility: HOSPITAL | Age: 55
Discharge: HOME | End: 2025-06-07
Attending: EMERGENCY MEDICINE
Payer: COMMERCIAL

## 2025-06-07 VITALS
HEART RATE: 98 BPM | DIASTOLIC BLOOD PRESSURE: 82 MMHG | SYSTOLIC BLOOD PRESSURE: 146 MMHG | OXYGEN SATURATION: 96 % | WEIGHT: 187.75 LBS | BODY MASS INDEX: 30.17 KG/M2 | TEMPERATURE: 98 F | HEIGHT: 66 IN | RESPIRATION RATE: 18 BRPM

## 2025-06-07 DIAGNOSIS — N76.0 BACTERIAL VAGINITIS: ICD-10-CM

## 2025-06-07 DIAGNOSIS — N30.00 ACUTE CYSTITIS WITHOUT HEMATURIA: Primary | ICD-10-CM

## 2025-06-07 DIAGNOSIS — B96.89 BACTERIAL VAGINITIS: ICD-10-CM

## 2025-06-07 LAB
BACTERIA #/AREA URNS HPF: ABNORMAL /HPF
BACTERIA GENITAL QL WET PREP: ABNORMAL
BILIRUB UR QL STRIP.AUTO: NEGATIVE
CLARITY UR: CLEAR
CLUE CELLS VAG QL WET PREP: ABNORMAL
COLOR UR AUTO: ABNORMAL
FILAMENT FUNGI VAG WET PREP-#/AREA: ABNORMAL
GLUCOSE UR QL STRIP: ABNORMAL
HCV AB SERPL QL IA: NEGATIVE
HGB UR QL STRIP: ABNORMAL
HIV 1+2 AB+HIV1 P24 AG SERPL QL IA: NEGATIVE
HOLD SPECIMEN: NORMAL
HOLD SPECIMEN: NORMAL
HYALINE CASTS #/AREA URNS LPF: 0 /LPF (ref 0–1)
KETONES UR QL STRIP: ABNORMAL
LEUKOCYTE ESTERASE UR QL STRIP: ABNORMAL
MICROSCOPIC COMMENT: ABNORMAL
NITRITE UR QL STRIP: POSITIVE
PH UR STRIP: 5 [PH]
PROT UR QL STRIP: ABNORMAL
RBC #/AREA URNS HPF: 5 /HPF (ref 0–4)
SP GR UR STRIP: 1.01
SQUAMOUS #/AREA URNS HPF: 1 /HPF
T VAGINALIS GENITAL QL WET PREP: ABNORMAL
UROBILINOGEN UR STRIP-ACNC: >=8 EU/DL
WBC #/AREA URNS HPF: 14 /HPF (ref 0–5)
WBC #/AREA VAG WET PREP: ABNORMAL
WBC CLUMPS URNS QL MICRO: ABNORMAL
YEAST GENITAL QL WET PREP: ABNORMAL

## 2025-06-07 PROCEDURE — 81003 URINALYSIS AUTO W/O SCOPE: CPT | Mod: ER | Performed by: PHYSICIAN ASSISTANT

## 2025-06-07 PROCEDURE — 99284 EMERGENCY DEPT VISIT MOD MDM: CPT | Mod: 25,ER

## 2025-06-07 PROCEDURE — 87086 URINE CULTURE/COLONY COUNT: CPT | Performed by: PHYSICIAN ASSISTANT

## 2025-06-07 PROCEDURE — 87210 SMEAR WET MOUNT SALINE/INK: CPT | Mod: ER | Performed by: PHYSICIAN ASSISTANT

## 2025-06-07 PROCEDURE — 86803 HEPATITIS C AB TEST: CPT | Performed by: PHYSICIAN ASSISTANT

## 2025-06-07 PROCEDURE — 63600175 PHARM REV CODE 636 W HCPCS: Mod: ER | Performed by: PHYSICIAN ASSISTANT

## 2025-06-07 PROCEDURE — 87389 HIV-1 AG W/HIV-1&-2 AB AG IA: CPT | Performed by: PHYSICIAN ASSISTANT

## 2025-06-07 PROCEDURE — 96372 THER/PROPH/DIAG INJ SC/IM: CPT | Performed by: PHYSICIAN ASSISTANT

## 2025-06-07 RX ORDER — METRONIDAZOLE 500 MG/1
500 TABLET ORAL EVERY 12 HOURS
Qty: 14 TABLET | Refills: 0 | Status: SHIPPED | OUTPATIENT
Start: 2025-06-07 | End: 2025-06-14

## 2025-06-07 RX ORDER — NITROFURANTOIN 25; 75 MG/1; MG/1
100 CAPSULE ORAL 2 TIMES DAILY
Qty: 14 CAPSULE | Refills: 0 | Status: SHIPPED | OUTPATIENT
Start: 2025-06-07 | End: 2025-06-14

## 2025-06-07 RX ORDER — FLUCONAZOLE 200 MG/1
200 TABLET ORAL
Qty: 2 TABLET | Refills: 0 | Status: SHIPPED | OUTPATIENT
Start: 2025-06-07 | End: 2025-06-08

## 2025-06-07 RX ORDER — CEFTRIAXONE 500 MG/1
500 INJECTION, POWDER, FOR SOLUTION INTRAMUSCULAR; INTRAVENOUS
Status: COMPLETED | OUTPATIENT
Start: 2025-06-07 | End: 2025-06-07

## 2025-06-07 RX ADMIN — CEFTRIAXONE SODIUM 500 MG: 500 INJECTION, POWDER, FOR SOLUTION INTRAMUSCULAR; INTRAVENOUS at 02:06

## 2025-06-07 NOTE — ED PROVIDER NOTES
History      Chief Complaint   Patient presents with    Dysuria     Patient states she has burning and itching.       Review of patient's allergies indicates:   Allergen Reactions    Ace inhibitors Swelling    Hydrochlorothiazide     Sulfa (sulfonamide antibiotics) Hives    Sulfur soap         HPI   HPI    6/7/2025, 12:48 PM   History obtained from the patient      History of Present Illness: Kasie Quevedo is a 54 y.o. female patient who presents to the Emergency Department for dysuria.  She started taking some antibiotics that she had left over and is now having itching.  She says she usually needs Diflucan when she is on antibiotics.  Denies flank pain or fever.    No further complaints or concerns at this time.   She requests a shot for her UTI          PCP: Rigo Sultana MD       Past Medical History:  Past Medical History:   Diagnosis Date    Anxiety     CHF (congestive heart failure)     Coronary artery disease     GERD (gastroesophageal reflux disease)     Hypertension     Miocardial Infarction     2015         Past Surgical History:  Past Surgical History:   Procedure Laterality Date    Aortic Dissection Repair      2015    BLADDER REPAIR      CARDIAC CATHETERIZATION      2015    CHOLECYSTECTOMY      CORONARY ANGIOPLASTY WITH STENT PLACEMENT      2015    HYSTERECTOMY      STOMACH SURGERY      Gastric sleeve           Family History:  No family history on file.        Social History:  Social History     Tobacco Use    Smoking status: Never    Smokeless tobacco: Never   Substance and Sexual Activity    Alcohol use: Yes     Comment: daily, wine    Drug use: No    Sexual activity: Yes     Partners: Male       ROS     Review of Systems   Constitutional:  Negative for fever.   Gastrointestinal:  Negative for vomiting.   Genitourinary:  Positive for dysuria.       Physical Exam      Initial Vitals [06/07/25 1236]   BP Pulse Resp Temp SpO2   (!) 146/82 98 18 98.4 °F (36.9 °C) 96 %      MAP       --     "     Physical Exam  Vital signs and nursing notes reviewed.  Constitutional: Patient is in NAD. Awake and alert. Well-developed and well-nourished.  Head: Atraumatic. Normocephalic.  Eyes:  EOM intact. Conjunctivae nl. No scleral icterus.  ENT: Mucous membranes are moist.   Neck: Supple.  No meningismus  Cardiovascular: Regular rate and rhythm. No murmurs, rubs, or gallops.   Pulmonary/Chest: No respiratory distress. Clear to auscultation bilaterally. No wheezing, rales, or rhonchi.  Abdominal: Soft. Non-distended. No TTP. No rebound, guarding, or rigidity. Good bowel sounds.  Genitourinary: No CVA tenderness  Musculoskeletal: Moves all extremities. No edema.   Skin: Warm and dry.  Neurological: Awake and alert. No acute focal neurological deficits are appreciated.  Psychiatric: Normal affect. Good eye contact. Appropriate in content.      ED Course          Procedures  ED Vital Signs:  Vitals:    06/07/25 1236   BP: (!) 146/82   Pulse: 98   Resp: 18   Temp: 98.4 °F (36.9 °C)   TempSrc: Oral   SpO2: 96%   Weight: 85.2 kg (187 lb 11.6 oz)   Height: 5' 6" (1.676 m)         Results for orders placed or performed during the hospital encounter of 06/07/25   Wet Prep, Genital    Collection Time: 06/07/25 12:58 PM    Specimen: Cervicovaginal; Genital   Result Value Ref Range    WBC Occasional (A) None    Bacteria Moderate (A) None    Clue Cells Rare (A) None    TRICHOMONAS  None None    BUDDING YEAST None None    FUNGAL HYPHAE None None   Urinalysis, Reflex to Urine Culture Urine, Clean Catch    Collection Time: 06/07/25 12:58 PM    Specimen: Urine, Clean Catch   Result Value Ref Range    Color, UA Orange (A) Straw, Kalli, Yellow, Light-Orange    Appearance, UA Clear Clear    pH, UA 5.0 5.0 - 8.0    Spec Grav UA 1.010 1.005 - 1.030    Protein, UA 2+ (A) Negative    Glucose, UA 2+ (A) Negative    Ketones, UA Trace (A) Negative    Bilirubin, UA Negative Negative    Blood, UA 1+ (A) Negative    Nitrites, UA Positive (A) " Negative    Urobilinogen, UA >=8.0 (A) <2.0 EU/dL    Leukocyte Esterase, UA 1+ (A) Negative   Urinalysis Microscopic    Collection Time: 06/07/25 12:58 PM   Result Value Ref Range    RBC, UA 5 (H) 0 - 4 /HPF    WBC, UA 14 (H) 0 - 5 /HPF    WBC Clumps, UA Occasional (A) None, Rare    Bacteria, UA Few (A) None, Rare, Occasional /HPF    Squamous Epithelial Cells, UA 1 /HPF    Hyaline Casts, UA 0 0 - 1 /LPF    Microscopic Comment               Imaging Results:  Imaging Results    None            The Emergency Provider reviewed the vital signs and test results, which are outlined above.    ED Discussion             Medication(s) given in the ER:  Medications - No data to display                   Medication List        ASK your doctor about these medications      AIMOVIG AUTOINJECTOR 140 mg/mL autoinjector  Generic drug: erenumab-aooe     albuterol 90 mcg/actuation inhaler  Commonly known as: PROVENTIL/VENTOLIN HFA  Inhale 1-2 puffs into the lungs every 6 (six) hours as needed for Wheezing. Rescue     amoxicillin-clavulanate 875-125mg 875-125 mg per tablet  Commonly known as: AUGMENTIN  Take 1 tablet by mouth 2 (two) times daily.     * aspirin 81 MG EC tablet  Commonly known as: ECOTRIN     * aspirin 81 mg Cap     atorvastatin 20 MG tablet  Commonly known as: LIPITOR     bumetanide 2 MG tablet  Commonly known as: BUMEX     busPIRone 15 MG tablet  Commonly known as: BUSPAR     calcium citrate 200 mg (950 mg) tablet  Commonly known as: CALCITRATE     calcium-vitamin D3 500 mg-5 mcg (200 unit) per tablet  Commonly known as: OS-JOHNATHON 500 + D3     citalopram 20 MG tablet  Commonly known as: CeleXA     clopidogreL 75 mg tablet  Commonly known as: PLAVIX     cyanocobalamin 1,000 mcg/mL injection     cyclobenzaprine 10 MG tablet  Commonly known as: FLEXERIL     estradioL 0.01 % (0.1 mg/gram) vaginal cream  Commonly known as: ESTRACE     fluconazole 150 MG Tab  Commonly known as: DIFLUCAN     fluticasone propionate 50 mcg/actuation  nasal spray  Commonly known as: FLONASE  1 spray (50 mcg total) by Each Nostril route 2 (two) times daily as needed.     gabapentin 300 MG capsule  Commonly known as: NEURONTIN     galcanezumab-gnlm 120 mg/mL Pnij     loratadine 10 mg tablet  Commonly known as: CLARITIN     losartan 50 MG tablet  Commonly known as: COZAAR     meclizine 25 mg tablet  Commonly known as: ANTIVERT  Take 1 tablet (25 mg total) by mouth every 6 (six) hours as needed.     methocarbamoL 750 MG Tab  Commonly known as: ROBAXIN     metoprolol succinate 100 MG 24 hr tablet  Commonly known as: TOPROL-XL     modafiniL 200 MG Tab  Commonly known as: PROVIGIL     multivitamin capsule     mupirocin 2 % ointment  Commonly known as: BACTROBAN  Apply topically 3 (three) times daily.     naproxen 500 MG tablet  Commonly known as: NAPROSYN  Take 1 tablet (500 mg total) by mouth 2 (two) times daily with meals.     nitroGLYCERIN 0.4 MG SL tablet  Commonly known as: NITROSTAT     omeprazole 40 MG capsule  Commonly known as: PRILOSEC     ondansetron 4 MG Tbdl  Commonly known as: ZOFRAN-ODT  Take 1 tablet (4 mg total) by mouth every 6 (six) hours as needed.     pantoprazole 40 MG tablet  Commonly known as: PROTONIX     potassium chloride SA 20 MEQ tablet  Commonly known as: K-DUR,KLOR-CON     rizatriptan 10 MG tablet  Commonly known as: MAXALT     solifenacin 5 MG tablet  Commonly known as: VESICARE     topiramate 200 MG Tab  Commonly known as: TOPAMAX     traZODone 100 MG tablet  Commonly known as: DESYREL     valsartan 80 MG tablet  Commonly known as: DIOVAN           * This list has 2 medication(s) that are the same as other medications prescribed for you. Read the directions carefully, and ask your doctor or other care provider to review them with you.                      Medical Decision Making        All findings were reviewed with the patient/family in detail.   All remaining questions and concerns were addressed at that time.  Patient/family has been  counseled regarding the need for follow-up as well as the indication to return to the emergency room should new or worrisome developments occur.        MDM     Amount and/or Complexity of Data Reviewed  Clinical lab tests: ordered and reviewed                   Clinical Impression:      No diagnosis found.            Annie Crisostomo, JATIN  06/07/25 9530

## 2025-06-10 LAB — BACTERIA UR CULT: ABNORMAL

## 2025-08-02 ENCOUNTER — HOSPITAL ENCOUNTER (EMERGENCY)
Facility: HOSPITAL | Age: 55
Discharge: HOME | End: 2025-08-02
Payer: MEDICARE

## 2025-08-02 VITALS
RESPIRATION RATE: 20 BRPM | SYSTOLIC BLOOD PRESSURE: 152 MMHG | HEART RATE: 76 BPM | BODY MASS INDEX: 30.81 KG/M2 | TEMPERATURE: 99 F | DIASTOLIC BLOOD PRESSURE: 81 MMHG | HEIGHT: 66 IN | WEIGHT: 191.69 LBS | OXYGEN SATURATION: 96 %

## 2025-08-02 DIAGNOSIS — S91.331A PUNCTURE WOUND OF RIGHT FOOT, INITIAL ENCOUNTER: Primary | ICD-10-CM

## 2025-08-02 DIAGNOSIS — S99.921A RIGHT FOOT INJURY, INITIAL ENCOUNTER: ICD-10-CM

## 2025-08-02 PROCEDURE — 99284 EMERGENCY DEPT VISIT MOD MDM: CPT | Mod: 25,ER

## 2025-08-02 RX ORDER — CEPHALEXIN 500 MG/1
500 CAPSULE ORAL 4 TIMES DAILY
Qty: 20 CAPSULE | Refills: 0 | Status: SHIPPED | OUTPATIENT
Start: 2025-08-02 | End: 2025-08-07

## 2025-08-02 RX ORDER — CIPROFLOXACIN 500 MG/1
500 TABLET, FILM COATED ORAL 2 TIMES DAILY
Qty: 20 TABLET | Refills: 0 | Status: SHIPPED | OUTPATIENT
Start: 2025-08-02 | End: 2025-08-12

## 2025-08-02 NOTE — DISCHARGE INSTRUCTIONS
You were seen today for a puncture wound. The x-ray did not show any fracture or retained foreign bodies. We washed out the wound and wrapped it. We generally don't need to give antibiotics to prevent infection. They can get infected however so I am sending you home with a prescription for antibiotics. Please start taking these if you notice signs of infection to the wound which would be worsening pain, redness to the area, pus or other concerning drainage from the wound, or any worsening swelling.     Please follow up with your primary care doctor in 3-4 days for a wound recheck.    Return to the ED for any fever, chills, spreading redness or signs of infection or any other new or concerning symptoms.    Dr. Sheriff

## 2025-08-02 NOTE — ED PROVIDER NOTES
Encounter Date: 8/2/2025       History     Chief Complaint   Patient presents with    Foot Pain     Stepped on nail today right foot     HPI    Kasie Quevedo is a 54 y.o. female with a past medical history of heart failure, prior MI, HTN who presents today via private vehicle for evaluation of a puncture wound to the right plantar foot that occurred approximately 3 hours prior to arrival.  Was outside wearing rubber sandals when she stepped on a nail in the grass.  Went through the shoe and into her foot.  Nail is approximately 1 in length and the sole of the shoe might have been about a half an inch.  No other injuries.  No history of diabetes.  No known peripheral arterial disease.    Review of patient's allergies indicates:   Allergen Reactions    Ace inhibitors Swelling    Hydrochlorothiazide     Sulfa (sulfonamide antibiotics) Hives    Sulfur soap      Past Medical History:   Diagnosis Date    Anxiety     CHF (congestive heart failure)     Coronary artery disease     GERD (gastroesophageal reflux disease)     Hypertension     Miocardial Infarction     2015     Past Surgical History:   Procedure Laterality Date    Aortic Dissection Repair      2015    BLADDER REPAIR      CARDIAC CATHETERIZATION      2015    CHOLECYSTECTOMY      CORONARY ANGIOPLASTY WITH STENT PLACEMENT      2015    HYSTERECTOMY      STOMACH SURGERY      Gastric sleeve     No family history on file.  Social History[1]  Review of Systems   Musculoskeletal:         + right foot pain   All other systems reviewed and are negative.      Physical Exam     Initial Vitals [08/02/25 1228]   BP Pulse Resp Temp SpO2   (!) 152/81 76 20 98.6 °F (37 °C) 96 %      MAP       --         Physical Exam  Constitutional: No acute distress  Head: Atraumatic. Normocephalic.   Eyes: Normal sclera. PERRL.   ENT: Moist mucous membranes  Cardiovascular: Well perfused. Equal pulses. Regular rate. Normal capillary refill  Pulmonary/Chest: No respiratory distress. Airway  patent. No tachypnea. No accessory muscle use.   Abd: Non-distended  Extremities: No peripheral edema.  Right foot:  No bony deformities.  No tenderness throughout the foot except for a small puncture wound with surrounding tenderness to the plantar surface of the mid foot in line with the 4th metatarsal.  No active bleeding.  No obvious foreign bodies.  Wound appears clean.  DP pulse 2 +.  Sensation intact throughout.  Able to wiggle toes.    Skin: Normal color. No rash.   Neurological: Alert, awake and appropriate. Normal speech.        ED Course   Procedures  Labs Reviewed - No data to display       Imaging Results              X-Ray Foot Complete Right (Final result)  Result time 08/02/25 12:52:07      Final result by Brian Callahan MD (08/02/25 12:52:07)                   Impression:      1.  Negative for acute process involving the visualized osseous structures.  2.  Soft tissue swelling of the dorsum of the foot without air in the soft tissues or repeat foreign bodies.  3.  Incidental findings as noted above.    Finalized on: 8/2/2025 12:52 PM By:  Brian Callahan MD  [facility]# [id number]      2025-08-02 12:54:10.393     [facility]               Narrative:      EXAM: XR FOOT COMPLETE 3 VIEW RIGHT    CLINICAL INDICATION:   Pain in right foot    FINDINGS:  No comparison studies are available.  3 views of the right foot were submitted for interpretation.  There is hypertrophy of the medial head of the first metatarsal bone.  There is fusion of the middle distal findings of the fifth toe.  Large plantar calcaneal spur.    Alignment is satisfactory. No     fractures, dislocations, or erosive arthritic change.  Negative for radiopaque foreign bodies or air in the soft tissues.  Soft tissue swelling of the dorsum of the foot.                                         Medications - No data to display  Medical Decision Making  54-year-old female with history and physical as above.  Presents for evaluation of a puncture wound of  the right foot from a nail.  Mildly hypertensive on arrival otherwise normal vitals.  Afebrile.  The injury just happened and patient cleaned the wound at home.  There are no signs of infection at this time although again the injury just occurred shortly prior to arrival.  Her tetanus is updated.  X-ray performed and did not reveal any foreign bodies or fracture.  Wound was irrigated copiously with normal saline and dressed with a clean dry bandage.  I discussed with the patient that we do not routinely prescribe prophylactic antibiotics for puncture wounds instead opt for a monitoring approach and antibiotic use only for signs of infection. She does have a primary care doctor that she can go to for wound recheck.  I will send her home with a prescription for Keflex as well as Cipro to cover for Pseudomonas given puncture through the sole of the shoe.  She understands to start taking these antibiotics immediately if she develops any signs of localized infection.  We discussed in detail what these clinical signs would be and how to monitor for them.  Instructed her to follow up for wound check with her PCP in 3-5 days, sooner if necessary.  Also discussed return to the emergency department if she develops systemic signs of infection such as fever, chills, body aches, or any worsening rapidly spreading redness, swelling, pain to the foot/ankle.  She expressed understanding of all details regarding treatment plan, follow up, and return precautions.  All questions were answered.  Stable for discharge home.    Amount and/or Complexity of Data Reviewed  Radiology: ordered. Decision-making details documented in ED Course.    Risk  Prescription drug management.               ED Course as of 08/02/25 1358   Sat Aug 02, 2025   1232 Tdap updated 08/03/2024 per [facility] immunization records. [KF]   1250 X-Ray Foot Complete Right  My independent interpretation: No radiopaque foreign bodies.  No fracture. [KF]      ED Course User  Index  [KF] Balbina Sheriff DO                               Clinical Impression:  Final diagnoses:  [S99.921A] Right foot injury, initial encounter  [S91.331A] Puncture wound of right foot, initial encounter (Primary)          ED Disposition Condition    Discharge Stable          ED Prescriptions       Medication Sig Dispense Start Date End Date Auth. Provider    cephALEXin (KEFLEX) 500 MG capsule Take 1 capsule (500 mg total) by mouth 4 (four) times daily. for 5 days 20 capsule 8/2/2025 8/7/2025 Balbina Sheriff DO    ciprofloxacin HCl (CIPRO) 500 MG tablet Take 1 tablet (500 mg total) by mouth 2 (two) times daily. for 10 days 20 tablet 8/2/2025 8/12/2025 Balbina Sheriff DO          Follow-up Information       Follow up With Specialties Details Why Contact Info    Fam Glover MD Internal Medicine Schedule an appointment as soon as possible for a visit in 3 days For wound re-check [address] [facility] [address]  [phone]      [facility] - Emergency Dept Emergency Medicine Go to  As needed, If symptoms worsen [id number] [facility] [address]  Emergency Department  [facility] [id number] [phone]                   [1]   Social History  Tobacco Use    Smoking status: Never    Smokeless tobacco: Never   Substance Use Topics    Alcohol use: Yes     Comment: daily, wine    Drug use: No        Balbina Sheriff DO  08/02/25 3979

## 2025-08-18 ENCOUNTER — HOSPITAL ENCOUNTER (INPATIENT)
Facility: HOSPITAL | Age: 55
LOS: 3 days | Discharge: HOME | DRG: 287 | End: 2025-08-22
Attending: EMERGENCY MEDICINE | Admitting: FAMILY MEDICINE
Payer: COMMERCIAL

## 2025-08-18 DIAGNOSIS — I25.110 ATHEROSCLEROSIS OF NATIVE CORONARY ARTERY OF NATIVE HEART WITH UNSTABLE ANGINA PECTORIS: ICD-10-CM

## 2025-08-18 DIAGNOSIS — I25.9 CHRONIC ISCHEMIC HEART DISEASE: ICD-10-CM

## 2025-08-18 DIAGNOSIS — I21.4 NSTEMI (NON-ST ELEVATED MYOCARDIAL INFARCTION): ICD-10-CM

## 2025-08-18 DIAGNOSIS — I21.4 NSTEMI (NON-ST ELEVATION MYOCARDIAL INFARCTION): ICD-10-CM

## 2025-08-18 DIAGNOSIS — I25.118 CORONARY ARTERY DISEASE OF NATIVE ARTERY OF NATIVE HEART WITH STABLE ANGINA PECTORIS: Chronic | ICD-10-CM

## 2025-08-18 DIAGNOSIS — I21.4 NON-ST ELEVATION MYOCARDIAL INFARCTION (NSTEMI): ICD-10-CM

## 2025-08-18 DIAGNOSIS — R00.2 PALPITATIONS: ICD-10-CM

## 2025-08-18 DIAGNOSIS — I50.22 CHRONIC SYSTOLIC CONGESTIVE HEART FAILURE: Chronic | ICD-10-CM

## 2025-08-18 DIAGNOSIS — I10 ESSENTIAL HYPERTENSION: Chronic | ICD-10-CM

## 2025-08-18 DIAGNOSIS — I50.22 CHRONIC SYSTOLIC (CONGESTIVE) HEART FAILURE: ICD-10-CM

## 2025-08-18 DIAGNOSIS — R07.89 OTHER CHEST PAIN: ICD-10-CM

## 2025-08-18 DIAGNOSIS — R07.9 CHEST PAIN: Primary | ICD-10-CM

## 2025-08-18 DIAGNOSIS — R79.89 ELEVATED TROPONIN: ICD-10-CM

## 2025-08-18 LAB
ABSOLUTE EOSINOPHIL (OHS): 0.23 K/UL
ABSOLUTE MONOCYTE (OHS): 0.41 K/UL (ref 0.3–1)
ABSOLUTE NEUTROPHIL COUNT (OHS): 2.6 K/UL (ref 1.8–7.7)
ALBUMIN SERPL BCP-MCNC: 3.7 G/DL (ref 3.5–5.2)
ALP SERPL-CCNC: 76 UNIT/L (ref 40–150)
ALT SERPL W/O P-5'-P-CCNC: 10 UNIT/L (ref 10–44)
ANION GAP (OHS): 13 MMOL/L (ref 8–16)
AST SERPL-CCNC: 20 UNIT/L (ref 11–45)
BACTERIA #/AREA URNS HPF: NORMAL /HPF
BASOPHILS # BLD AUTO: 0.02 K/UL
BASOPHILS NFR BLD AUTO: 0.4 %
BILIRUB SERPL-MCNC: 0.3 MG/DL (ref 0.1–1)
BILIRUB UR QL STRIP.AUTO: NEGATIVE
BUN SERPL-MCNC: 15 MG/DL (ref 6–20)
CALCIUM SERPL-MCNC: 8.6 MG/DL (ref 8.7–10.5)
CHLORIDE SERPL-SCNC: 111 MMOL/L (ref 95–110)
CK SERPL-CCNC: 80 U/L (ref 20–180)
CLARITY UR: CLEAR
CO2 SERPL-SCNC: 20 MMOL/L (ref 23–29)
COLOR UR AUTO: YELLOW
CREAT SERPL-MCNC: 0.9 MG/DL (ref 0.5–1.4)
ERYTHROCYTE [DISTWIDTH] IN BLOOD BY AUTOMATED COUNT: 14.9 % (ref 11.5–14.5)
GFR SERPLBLD CREATININE-BSD FMLA CKD-EPI: >60 ML/MIN/1.73/M2
GLUCOSE SERPL-MCNC: 77 MG/DL (ref 70–110)
GLUCOSE UR QL STRIP: NEGATIVE
HCT VFR BLD AUTO: 36.3 % (ref 37–48.5)
HGB BLD-MCNC: 11.9 GM/DL (ref 12–16)
HGB UR QL STRIP: ABNORMAL
IMM GRANULOCYTES # BLD AUTO: 0.01 K/UL (ref 0–0.04)
IMM GRANULOCYTES NFR BLD AUTO: 0.2 % (ref 0–0.5)
KETONES UR QL STRIP: NEGATIVE
LEUKOCYTE ESTERASE UR QL STRIP: NEGATIVE
LYMPHOCYTES # BLD AUTO: 1.91 K/UL (ref 1–4.8)
MAGNESIUM SERPL-MCNC: 1.8 MG/DL (ref 1.6–2.6)
MCH RBC QN AUTO: 28.5 PG (ref 27–31)
MCHC RBC AUTO-ENTMCNC: 32.8 G/DL (ref 32–36)
MCV RBC AUTO: 87 FL (ref 82–98)
MICROSCOPIC COMMENT: NORMAL
NITRITE UR QL STRIP: NEGATIVE
NT-PROBNP SERPL-MCNC: 521 PG/ML
NUCLEATED RBC (/100WBC) (OHS): 0 /100 WBC
PH UR STRIP: 6 [PH]
PLATELET # BLD AUTO: 190 K/UL (ref 150–450)
PMV BLD AUTO: 10.9 FL (ref 9.2–12.9)
POTASSIUM SERPL-SCNC: 3.9 MMOL/L (ref 3.5–5.1)
PROT SERPL-MCNC: 6.7 GM/DL (ref 6–8.4)
PROT UR QL STRIP: NEGATIVE
RBC # BLD AUTO: 4.18 M/UL (ref 4–5.4)
RBC #/AREA URNS HPF: 3 /HPF (ref 0–4)
RELATIVE EOSINOPHIL (OHS): 4.4 %
RELATIVE LYMPHOCYTE (OHS): 36.9 % (ref 18–48)
RELATIVE MONOCYTE (OHS): 7.9 % (ref 4–15)
RELATIVE NEUTROPHIL (OHS): 50.2 % (ref 38–73)
SODIUM SERPL-SCNC: 144 MMOL/L (ref 136–145)
SP GR UR STRIP: 1.01
SQUAMOUS #/AREA URNS HPF: 5 /HPF
T4 FREE SERPL-MCNC: 0.95 NG/DL (ref 0.71–1.51)
TROPONIN I SERPL HS-MCNC: 45 NG/L
TROPONIN I SERPL HS-MCNC: 53 NG/L
TSH SERPL-ACNC: 0.38 UIU/ML (ref 0.4–4)
UROBILINOGEN UR STRIP-ACNC: 1 EU/DL
WBC # BLD AUTO: 5.18 K/UL (ref 3.9–12.7)

## 2025-08-18 PROCEDURE — 99285 EMERGENCY DEPT VISIT HI MDM: CPT | Mod: 25,ER

## 2025-08-18 PROCEDURE — 84484 ASSAY OF TROPONIN QUANT: CPT | Mod: ER | Performed by: EMERGENCY MEDICINE

## 2025-08-18 PROCEDURE — G0378 HOSPITAL OBSERVATION PER HR: HCPCS | Mod: ER

## 2025-08-18 PROCEDURE — 82550 ASSAY OF CK (CPK): CPT | Mod: ER | Performed by: EMERGENCY MEDICINE

## 2025-08-18 PROCEDURE — 93005 ELECTROCARDIOGRAM TRACING: CPT | Mod: ER

## 2025-08-18 PROCEDURE — 85025 COMPLETE CBC W/AUTO DIFF WBC: CPT | Mod: ER | Performed by: EMERGENCY MEDICINE

## 2025-08-18 PROCEDURE — 83880 ASSAY OF NATRIURETIC PEPTIDE: CPT | Mod: ER | Performed by: EMERGENCY MEDICINE

## 2025-08-18 PROCEDURE — 93010 ELECTROCARDIOGRAM REPORT: CPT | Mod: ,,, | Performed by: INTERNAL MEDICINE

## 2025-08-18 PROCEDURE — 81001 URINALYSIS AUTO W/SCOPE: CPT | Mod: ER | Performed by: EMERGENCY MEDICINE

## 2025-08-18 PROCEDURE — 80053 COMPREHEN METABOLIC PANEL: CPT | Mod: ER | Performed by: EMERGENCY MEDICINE

## 2025-08-18 PROCEDURE — 84439 ASSAY OF FREE THYROXINE: CPT | Mod: ER | Performed by: EMERGENCY MEDICINE

## 2025-08-18 PROCEDURE — 83735 ASSAY OF MAGNESIUM: CPT | Mod: ER | Performed by: EMERGENCY MEDICINE

## 2025-08-19 PROBLEM — I25.10 CAD (CORONARY ARTERY DISEASE): Chronic | Status: ACTIVE | Noted: 2025-08-19

## 2025-08-19 PROBLEM — K21.9 GERD (GASTROESOPHAGEAL REFLUX DISEASE): Status: ACTIVE | Noted: 2025-08-19

## 2025-08-19 PROBLEM — R00.2 PALPITATIONS: Status: ACTIVE | Noted: 2025-08-19

## 2025-08-19 PROBLEM — E66.09 CLASS 1 OBESITY DUE TO EXCESS CALORIES WITH BODY MASS INDEX (BMI) OF 30.0 TO 30.9 IN ADULT: Chronic | Status: ACTIVE | Noted: 2025-08-19

## 2025-08-19 PROBLEM — I50.22 CHRONIC SYSTOLIC CONGESTIVE HEART FAILURE: Status: ACTIVE | Noted: 2025-08-19

## 2025-08-19 PROBLEM — R79.89 ELEVATED TROPONIN: Status: ACTIVE | Noted: 2025-08-19

## 2025-08-19 PROBLEM — I50.22 CHRONIC SYSTOLIC CONGESTIVE HEART FAILURE: Chronic | Status: ACTIVE | Noted: 2025-08-19

## 2025-08-19 PROBLEM — K21.9 GERD (GASTROESOPHAGEAL REFLUX DISEASE): Chronic | Status: ACTIVE | Noted: 2025-08-19

## 2025-08-19 PROBLEM — E66.09 CLASS 1 OBESITY DUE TO EXCESS CALORIES WITH BODY MASS INDEX (BMI) OF 30.0 TO 30.9 IN ADULT: Status: ACTIVE | Noted: 2025-08-19

## 2025-08-19 PROBLEM — I25.10 CAD (CORONARY ARTERY DISEASE): Status: ACTIVE | Noted: 2025-08-19

## 2025-08-19 PROBLEM — E66.811 CLASS 1 OBESITY DUE TO EXCESS CALORIES WITH BODY MASS INDEX (BMI) OF 30.0 TO 30.9 IN ADULT: Status: ACTIVE | Noted: 2025-08-19

## 2025-08-19 PROBLEM — E66.811 CLASS 1 OBESITY DUE TO EXCESS CALORIES WITH BODY MASS INDEX (BMI) OF 30.0 TO 30.9 IN ADULT: Chronic | Status: ACTIVE | Noted: 2025-08-19

## 2025-08-19 LAB
ABORH RETYPE: NORMAL
AORTIC ROOT ANNULUS: 3.5 CM
AORTIC SIZE INDEX: 1.6 CM/M2
APTT PPP: 28.7 SECONDS (ref 21–32)
ASCENDING AORTA: 3.2 CM
AV INDEX (PROSTH): 0.81
AV MEAN GRADIENT: 3 MMHG
AV PEAK GRADIENT: 7 MMHG
AV REGURGITATION PRESSURE HALF TIME: 1085 MS
AV VALVE AREA BY VELOCITY RATIO: 2.2 CM²
AV VALVE AREA: 2.5 CM²
AV VELOCITY RATIO: 0.69
BSA FOR ECHO PROCEDURE: 2.03 M2
CHOLEST SERPL-MCNC: 125 MG/DL (ref 120–199)
CHOLEST/HDLC SERPL: 2.5 {RATIO} (ref 2–5)
CV ECHO LV RWT: 0.53 CM
DOP CALC AO PEAK VEL: 1.3 M/S
DOP CALC AO VTI: 25.9 CM
DOP CALC LVOT AREA: 3.1 CM2
DOP CALC LVOT DIAMETER: 2 CM
DOP CALC LVOT PEAK VEL: 0.9 M/S
DOP CALC RVOT PEAK VEL: 0.66 M/S
DOP CALC RVOT VTI: 15.4 CM
DOP CALCLVOT PEAK VEL VTI: 21 CM
E WAVE DECELERATION TIME: 177 MSEC
E/A RATIO: 1.38
E/E' RATIO: 10 M/S
EAG (OHS): 108 MG/DL (ref 68–131)
ECHO LV POSTERIOR WALL: 1.3 CM (ref 0.6–1.1)
EJECTION FRACTION: 50 %
FRACTIONAL SHORTENING: 26.5 % (ref 28–44)
HBA1C MFR BLD: 5.4 % (ref 4–5.6)
HDLC SERPL-MCNC: 50 MG/DL (ref 40–75)
HDLC SERPL: 40 % (ref 20–50)
HOLD SPECIMEN: NORMAL
HOLD SPECIMEN: NORMAL
INDIRECT COOMBS: NORMAL
INR PPP: 0.9 (ref 0.8–1.2)
INTERVENTRICULAR SEPTUM: 1.3 CM (ref 0.6–1.1)
IVC DIAMETER: 1.69 CM
IVRT: 80 MSEC
LA MAJOR: 5.7 CM
LA MINOR: 5.6 CM
LA WIDTH: 4 CM
LDLC SERPL CALC-MCNC: 63.2 MG/DL (ref 63–159)
LEFT ATRIUM SIZE: 4.3 CM
LEFT ATRIUM VOLUME INDEX: 42 ML/M2
LEFT ATRIUM VOLUME: 83 CM3
LEFT INTERNAL DIMENSION IN SYSTOLE: 3.6 CM (ref 2.1–4)
LEFT VENTRICLE DIASTOLIC VOLUME INDEX: 56.06 ML/M2
LEFT VENTRICLE DIASTOLIC VOLUME: 111 ML
LEFT VENTRICLE MASS INDEX: 128.1 G/M2
LEFT VENTRICLE SYSTOLIC VOLUME INDEX: 27.3 ML/M2
LEFT VENTRICLE SYSTOLIC VOLUME: 54 ML
LEFT VENTRICULAR INTERNAL DIMENSION IN DIASTOLE: 4.9 CM (ref 3.5–6)
LEFT VENTRICULAR MASS: 253.7 G
LV LATERAL E/E' RATIO: 9.2 M/S
LV SEPTAL E/E' RATIO: 10.4 M/S
LVED V (TEICH): 110.9 ML
LVES V (TEICH): 53.65 ML
LVOT MG: 2.37 MMHG
LVOT MV: 0.77 CM/S
Lab: 1.9 CM/M
MV PEAK A VEL: 0.6 M/S
MV PEAK E VEL: 0.83 M/S
MV STENOSIS PRESSURE HALF TIME: 51.3 MS
MV VALVE AREA P 1/2 METHOD: 4.29 CM2
NONHDLC SERPL-MCNC: 75 MG/DL
OHS CV CPX PATIENT HEIGHT IN: 66
PISA AR MAX VEL: 3.6 M/S
PISA MRMAX VEL: 5.79 M/S
PISA TR MAX VEL: 2.7 M/S
PROTHROMBIN TIME: 10.9 SECONDS (ref 9–12.5)
PV MEAN GRADIENT: 1 MMHG
RA MAJOR: 4.53 CM
RA PRESSURE ESTIMATED: 3 MMHG
RA WIDTH: 2.8 CM
RH BLD: NORMAL
RV TB RVSP: 6 MMHG
SPECIMEN OUTDATE: NORMAL
STJ: 3.2 CM
TDI LATERAL: 0.09 M/S
TDI SEPTAL: 0.08 M/S
TDI: 0.09 M/S
TR MAX PG: 29 MMHG
TR MEAN GRADIENT: 26 MMHG
TRICUSPID ANNULAR PLANE SYSTOLIC EXCURSION: 2 CM
TRIGL SERPL-MCNC: 59 MG/DL (ref 30–150)
TROPONIN I SERPL HS-MCNC: 48 NG/L
TROPONIN I SERPL HS-MCNC: 50 NG/L
TROPONIN I SERPL HS-MCNC: 51 NG/L
TV REST PULMONARY ARTERY PRESSURE: 32 MMHG
Z-SCORE OF LEFT VENTRICULAR DIMENSION IN END DIASTOLE: -1.54
Z-SCORE OF LEFT VENTRICULAR DIMENSION IN END SYSTOLE: 0.2

## 2025-08-19 PROCEDURE — 93010 ELECTROCARDIOGRAM REPORT: CPT | Mod: ,,, | Performed by: INTERNAL MEDICINE

## 2025-08-19 PROCEDURE — 86850 RBC ANTIBODY SCREEN: CPT | Performed by: HOSPITALIST

## 2025-08-19 PROCEDURE — 36415 COLL VENOUS BLD VENIPUNCTURE: CPT | Performed by: HOSPITALIST

## 2025-08-19 PROCEDURE — 25000003 PHARM REV CODE 250: Performed by: HOSPITALIST

## 2025-08-19 PROCEDURE — 84484 ASSAY OF TROPONIN QUANT: CPT | Mod: 91 | Performed by: HOSPITALIST

## 2025-08-19 PROCEDURE — 83036 HEMOGLOBIN GLYCOSYLATED A1C: CPT | Performed by: HOSPITALIST

## 2025-08-19 PROCEDURE — 93005 ELECTROCARDIOGRAM TRACING: CPT

## 2025-08-19 PROCEDURE — 63600175 PHARM REV CODE 636 W HCPCS: Performed by: HOSPITALIST

## 2025-08-19 PROCEDURE — 85730 THROMBOPLASTIN TIME PARTIAL: CPT | Performed by: HOSPITALIST

## 2025-08-19 PROCEDURE — 99223 1ST HOSP IP/OBS HIGH 75: CPT | Mod: 25,,, | Performed by: INTERNAL MEDICINE

## 2025-08-19 PROCEDURE — 85610 PROTHROMBIN TIME: CPT | Performed by: HOSPITALIST

## 2025-08-19 PROCEDURE — 25000242 PHARM REV CODE 250 ALT 637 W/ HCPCS: Performed by: HOSPITALIST

## 2025-08-19 PROCEDURE — 21400001 HC TELEMETRY ROOM

## 2025-08-19 PROCEDURE — 80061 LIPID PANEL: CPT | Performed by: HOSPITALIST

## 2025-08-19 RX ORDER — ACETAMINOPHEN 325 MG/1
650 TABLET ORAL EVERY 6 HOURS PRN
Status: DISCONTINUED | OUTPATIENT
Start: 2025-08-19 | End: 2025-08-21 | Stop reason: SDUPTHER

## 2025-08-19 RX ORDER — MORPHINE SULFATE 4 MG/ML
2 INJECTION, SOLUTION INTRAMUSCULAR; INTRAVENOUS EVERY 4 HOURS PRN
Status: DISCONTINUED | OUTPATIENT
Start: 2025-08-19 | End: 2025-08-22 | Stop reason: HOSPADM

## 2025-08-19 RX ORDER — CLOPIDOGREL BISULFATE 75 MG/1
75 TABLET ORAL DAILY
Status: DISCONTINUED | OUTPATIENT
Start: 2025-08-20 | End: 2025-08-22 | Stop reason: HOSPADM

## 2025-08-19 RX ORDER — HYDROCODONE BITARTRATE AND ACETAMINOPHEN 5; 325 MG/1; MG/1
1 TABLET ORAL EVERY 6 HOURS PRN
Refills: 0 | Status: DISCONTINUED | OUTPATIENT
Start: 2025-08-19 | End: 2025-08-22 | Stop reason: HOSPADM

## 2025-08-19 RX ORDER — PANTOPRAZOLE SODIUM 40 MG/1
40 TABLET, DELAYED RELEASE ORAL DAILY
Status: DISCONTINUED | OUTPATIENT
Start: 2025-08-19 | End: 2025-08-22 | Stop reason: HOSPADM

## 2025-08-19 RX ORDER — ONDANSETRON 8 MG/1
8 TABLET, ORALLY DISINTEGRATING ORAL EVERY 8 HOURS PRN
Status: DISCONTINUED | OUTPATIENT
Start: 2025-08-19 | End: 2025-08-21 | Stop reason: SDUPTHER

## 2025-08-19 RX ORDER — VALSARTAN 40 MG/1
80 TABLET ORAL DAILY
Status: DISCONTINUED | OUTPATIENT
Start: 2025-08-19 | End: 2025-08-22 | Stop reason: HOSPADM

## 2025-08-19 RX ORDER — POLYETHYLENE GLYCOL 3350 17 G/17G
17 POWDER, FOR SOLUTION ORAL DAILY PRN
Status: DISCONTINUED | OUTPATIENT
Start: 2025-08-19 | End: 2025-08-22 | Stop reason: HOSPADM

## 2025-08-19 RX ORDER — ENOXAPARIN SODIUM 100 MG/ML
40 INJECTION SUBCUTANEOUS EVERY 24 HOURS
Status: DISCONTINUED | OUTPATIENT
Start: 2025-08-19 | End: 2025-08-22 | Stop reason: HOSPADM

## 2025-08-19 RX ORDER — ATORVASTATIN CALCIUM 10 MG/1
20 TABLET, FILM COATED ORAL DAILY
Status: DISCONTINUED | OUTPATIENT
Start: 2025-08-19 | End: 2025-08-22 | Stop reason: HOSPADM

## 2025-08-19 RX ORDER — NITROGLYCERIN 0.4 MG/1
0.4 TABLET SUBLINGUAL EVERY 5 MIN PRN
Status: DISCONTINUED | OUTPATIENT
Start: 2025-08-19 | End: 2025-08-22 | Stop reason: HOSPADM

## 2025-08-19 RX ORDER — NAPROXEN SODIUM 220 MG/1
81 TABLET, FILM COATED ORAL DAILY
Status: DISCONTINUED | OUTPATIENT
Start: 2025-08-20 | End: 2025-08-22 | Stop reason: HOSPADM

## 2025-08-19 RX ORDER — PROMETHAZINE HYDROCHLORIDE 25 MG/1
25 TABLET ORAL EVERY 6 HOURS PRN
Status: DISCONTINUED | OUTPATIENT
Start: 2025-08-19 | End: 2025-08-22 | Stop reason: HOSPADM

## 2025-08-19 RX ORDER — ACETAMINOPHEN 325 MG/1
650 TABLET ORAL EVERY 6 HOURS PRN
Status: DISCONTINUED | OUTPATIENT
Start: 2025-08-19 | End: 2025-08-22 | Stop reason: HOSPADM

## 2025-08-19 RX ORDER — SOTALOL HYDROCHLORIDE 80 MG/1
80 TABLET ORAL 2 TIMES DAILY
Status: DISCONTINUED | OUTPATIENT
Start: 2025-08-19 | End: 2025-08-22 | Stop reason: HOSPADM

## 2025-08-19 RX ADMIN — VALSARTAN 80 MG: 40 TABLET, FILM COATED ORAL at 09:08

## 2025-08-19 RX ADMIN — ATORVASTATIN CALCIUM 20 MG: 10 TABLET, FILM COATED ORAL at 09:08

## 2025-08-19 RX ADMIN — BUSPIRONE HYDROCHLORIDE 15 MG: 10 TABLET ORAL at 08:08

## 2025-08-19 RX ADMIN — PANTOPRAZOLE SODIUM 40 MG: 40 TABLET, DELAYED RELEASE ORAL at 09:08

## 2025-08-19 RX ADMIN — ENOXAPARIN SODIUM 40 MG: 40 INJECTION SUBCUTANEOUS at 06:08

## 2025-08-19 RX ADMIN — SOTALOL HYDROCHLORIDE 80 MG: 80 TABLET ORAL at 09:08

## 2025-08-19 RX ADMIN — SOTALOL HYDROCHLORIDE 80 MG: 80 TABLET ORAL at 08:08

## 2025-08-19 RX ADMIN — NITROGLYCERIN 0.4 MG: 0.4 TABLET SUBLINGUAL at 03:08

## 2025-08-20 LAB
ABSOLUTE NEUTROPHIL MANUAL (OHS): 1.9 K/UL (ref 1.8–7.7)
ALBUMIN SERPL BCP-MCNC: 3.4 G/DL (ref 3.5–5.2)
ALP SERPL-CCNC: 63 UNIT/L (ref 40–150)
ALT SERPL W/O P-5'-P-CCNC: 8 UNIT/L (ref 10–44)
ANION GAP (OHS): 6 MMOL/L (ref 8–16)
AST SERPL-CCNC: 15 UNIT/L (ref 11–45)
BASOPHILS NFR BLD MANUAL: 1 %
BILIRUB SERPL-MCNC: 0.3 MG/DL (ref 0.1–1)
BUN SERPL-MCNC: 14 MG/DL (ref 6–20)
CALCIUM SERPL-MCNC: 9 MG/DL (ref 8.7–10.5)
CHLORIDE SERPL-SCNC: 109 MMOL/L (ref 95–110)
CO2 SERPL-SCNC: 28 MMOL/L (ref 23–29)
CREAT SERPL-MCNC: 0.8 MG/DL (ref 0.5–1.4)
CV STRESS BASE HR: 49 BPM
DIASTOLIC BLOOD PRESSURE: 100 MMHG
EJECTION FRACTION- HIGH: 73 %
END DIASTOLIC INDEX-HIGH: 165 ML/M2
END DIASTOLIC INDEX-LOW: 101 ML/M2
END SYSTOLIC INDEX-HIGH: 64 ML/M2
END SYSTOLIC INDEX-LOW: 28 ML/M2
EOSINOPHIL NFR BLD MANUAL: 1 % (ref 0–8)
ERYTHROCYTE [DISTWIDTH] IN BLOOD BY AUTOMATED COUNT: 14.5 % (ref 11.5–14.5)
GFR SERPLBLD CREATININE-BSD FMLA CKD-EPI: >60 ML/MIN/1.73/M2
GLUCOSE SERPL-MCNC: 103 MG/DL (ref 70–110)
HCT VFR BLD AUTO: 34.8 % (ref 37–48.5)
HGB BLD-MCNC: 11.3 GM/DL (ref 12–16)
LARGE/GIANT PLATELETS (OHS): PRESENT
LYMPHOCYTES NFR BLD MANUAL: 40 % (ref 18–48)
MCH RBC QN AUTO: 28.5 PG (ref 27–31)
MCHC RBC AUTO-ENTMCNC: 32.5 G/DL (ref 32–36)
MCV RBC AUTO: 88 FL (ref 82–98)
MONOCYTES NFR BLD MANUAL: 10 % (ref 4–15)
NEUTROPHILS NFR BLD MANUAL: 48 % (ref 38–73)
NUC REST EJECTION FRACTION: 20
NUC STRESS EJECTION FRACTION: 53 %
NUCLEATED RBC (/100WBC) (OHS): 0 /100 WBC
OHS CV CPX 85 PERCENT MAX PREDICTED HEART RATE MALE: 139
OHS CV CPX MAX PREDICTED HEART RATE: 164
OHS CV CPX PATIENT HEIGHT IN: 66
OHS CV CPX PATIENT IS FEMALE: 1
OHS CV CPX PATIENT IS MALE: 0
OHS CV CPX PEAK DIASTOLIC BLOOD PRESSURE: 100 MMHG
OHS CV CPX PEAK HEAR RATE: 77 BPM
OHS CV CPX PEAK RATE PRESSURE PRODUCT: NORMAL
OHS CV CPX PEAK SYSTOLIC BLOOD PRESSURE: 150 MMHG
OHS CV CPX PERCENT MAX PREDICTED HEART RATE ACHIEVED: 49
OHS CV CPX RATE PRESSURE PRODUCT PRESENTING: 7350
OHS CV INITIAL DOSE: 10.3 MCG/KG/MIN
OHS CV PEAK DOSE: 30.8 MCG/KG/MIN
OHS QRS DURATION: 106 MS
OHS QRS DURATION: 110 MS
OHS QRS DURATION: 114 MS
OHS QTC CALCULATION: 398 MS
OHS QTC CALCULATION: 441 MS
OHS QTC CALCULATION: 453 MS
PLATELET # BLD AUTO: 159 K/UL (ref 150–450)
PLATELET BLD QL SMEAR: NORMAL
PMV BLD AUTO: 10.8 FL (ref 9.2–12.9)
POTASSIUM SERPL-SCNC: 3.5 MMOL/L (ref 3.5–5.1)
PROT SERPL-MCNC: 5.9 GM/DL (ref 6–8.4)
RBC # BLD AUTO: 3.97 M/UL (ref 4–5.4)
RETIRED EF AND QEF - SEE NOTES: 59 %
SODIUM SERPL-SCNC: 143 MMOL/L (ref 136–145)
SYSTOLIC BLOOD PRESSURE: 150 MMHG
WBC # BLD AUTO: 3.9 K/UL (ref 3.9–12.7)

## 2025-08-20 PROCEDURE — 63600175 PHARM REV CODE 636 W HCPCS: Performed by: FAMILY MEDICINE

## 2025-08-20 PROCEDURE — A9502 TC99M TETROFOSMIN: HCPCS | Performed by: FAMILY MEDICINE

## 2025-08-20 PROCEDURE — 36415 COLL VENOUS BLD VENIPUNCTURE: CPT | Performed by: PHYSICIAN ASSISTANT

## 2025-08-20 PROCEDURE — 63600175 PHARM REV CODE 636 W HCPCS: Performed by: HOSPITALIST

## 2025-08-20 PROCEDURE — 25000003 PHARM REV CODE 250: Performed by: HOSPITALIST

## 2025-08-20 PROCEDURE — 21400001 HC TELEMETRY ROOM

## 2025-08-20 PROCEDURE — 84075 ASSAY ALKALINE PHOSPHATASE: CPT | Performed by: PHYSICIAN ASSISTANT

## 2025-08-20 PROCEDURE — 93010 ELECTROCARDIOGRAM REPORT: CPT | Mod: XE,,, | Performed by: INTERNAL MEDICINE

## 2025-08-20 PROCEDURE — 85025 COMPLETE CBC W/AUTO DIFF WBC: CPT | Performed by: PHYSICIAN ASSISTANT

## 2025-08-20 PROCEDURE — 99900035 HC TECH TIME PER 15 MIN (STAT)

## 2025-08-20 PROCEDURE — 93005 ELECTROCARDIOGRAM TRACING: CPT | Mod: ER

## 2025-08-20 PROCEDURE — 27000190 HC CPAP FULL FACE MASK W/VALVE

## 2025-08-20 RX ORDER — REGADENOSON 0.08 MG/ML
0.4 INJECTION, SOLUTION INTRAVENOUS ONCE
Status: COMPLETED | OUTPATIENT
Start: 2025-08-20 | End: 2025-08-20

## 2025-08-20 RX ADMIN — ENOXAPARIN SODIUM 40 MG: 40 INJECTION SUBCUTANEOUS at 05:08

## 2025-08-20 RX ADMIN — CLOPIDOGREL 75 MG: 75 TABLET ORAL at 09:08

## 2025-08-20 RX ADMIN — BUSPIRONE HYDROCHLORIDE 15 MG: 10 TABLET ORAL at 08:08

## 2025-08-20 RX ADMIN — REGADENOSON 0.4 MG: 0.08 INJECTION, SOLUTION INTRAVENOUS at 12:08

## 2025-08-20 RX ADMIN — SOTALOL HYDROCHLORIDE 80 MG: 80 TABLET ORAL at 09:08

## 2025-08-20 RX ADMIN — TETROFOSMIN 10.3 MILLICURIE: 1.38 INJECTION, POWDER, LYOPHILIZED, FOR SOLUTION INTRAVENOUS at 11:08

## 2025-08-20 RX ADMIN — TETROFOSMIN 30.8 MILLICURIE: 1.38 INJECTION, POWDER, LYOPHILIZED, FOR SOLUTION INTRAVENOUS at 12:08

## 2025-08-20 RX ADMIN — SOTALOL HYDROCHLORIDE 80 MG: 80 TABLET ORAL at 08:08

## 2025-08-20 RX ADMIN — PANTOPRAZOLE SODIUM 40 MG: 40 TABLET, DELAYED RELEASE ORAL at 09:08

## 2025-08-20 RX ADMIN — VALSARTAN 80 MG: 40 TABLET, FILM COATED ORAL at 09:08

## 2025-08-20 RX ADMIN — ASPIRIN 81 MG CHEWABLE TABLET 81 MG: 81 TABLET CHEWABLE at 09:08

## 2025-08-20 RX ADMIN — ATORVASTATIN CALCIUM 20 MG: 10 TABLET, FILM COATED ORAL at 09:08

## 2025-08-21 LAB
ABSOLUTE EOSINOPHIL (OHS): 0.13 K/UL
ABSOLUTE MONOCYTE (OHS): 0.29 K/UL (ref 0.3–1)
ABSOLUTE NEUTROPHIL COUNT (OHS): 1.73 K/UL (ref 1.8–7.7)
ALBUMIN SERPL BCP-MCNC: 3.6 G/DL (ref 3.5–5.2)
ALP SERPL-CCNC: 64 UNIT/L (ref 40–150)
ALT SERPL W/O P-5'-P-CCNC: 8 UNIT/L (ref 10–44)
ANION GAP (OHS): 7 MMOL/L (ref 8–16)
AST SERPL-CCNC: 12 UNIT/L (ref 11–45)
BASOPHILS # BLD AUTO: 0.02 K/UL
BASOPHILS NFR BLD AUTO: 0.6 %
BILIRUB SERPL-MCNC: 0.3 MG/DL (ref 0.1–1)
BUN SERPL-MCNC: 13 MG/DL (ref 6–20)
CALCIUM SERPL-MCNC: 9 MG/DL (ref 8.7–10.5)
CATH EF QUANTITATIVE: 45 %
CHLORIDE SERPL-SCNC: 109 MMOL/L (ref 95–110)
CO2 SERPL-SCNC: 29 MMOL/L (ref 23–29)
CREAT SERPL-MCNC: 0.8 MG/DL (ref 0.5–1.4)
DACRYOCYTES BLD QL SMEAR: NORMAL
ERYTHROCYTE [DISTWIDTH] IN BLOOD BY AUTOMATED COUNT: 14.6 % (ref 11.5–14.5)
GFR SERPLBLD CREATININE-BSD FMLA CKD-EPI: >60 ML/MIN/1.73/M2
GLUCOSE SERPL-MCNC: 125 MG/DL (ref 70–110)
HCT VFR BLD AUTO: 37.3 % (ref 37–48.5)
HGB BLD-MCNC: 12 GM/DL (ref 12–16)
IMM GRANULOCYTES # BLD AUTO: 0.01 K/UL (ref 0–0.04)
IMM GRANULOCYTES NFR BLD AUTO: 0.3 % (ref 0–0.5)
LYMPHOCYTES # BLD AUTO: 1.42 K/UL (ref 1–4.8)
MCH RBC QN AUTO: 28.4 PG (ref 27–31)
MCHC RBC AUTO-ENTMCNC: 32.2 G/DL (ref 32–36)
MCV RBC AUTO: 88 FL (ref 82–98)
NUCLEATED RBC (/100WBC) (OHS): 0 /100 WBC
OHS CV CPX PATIENT HEIGHT IN: 66
PLATELET # BLD AUTO: 172 K/UL (ref 150–450)
PLATELET BLD QL SMEAR: NORMAL
PMV BLD AUTO: 11.1 FL (ref 9.2–12.9)
POIKILOCYTOSIS BLD QL SMEAR: SLIGHT
POTASSIUM SERPL-SCNC: 3.7 MMOL/L (ref 3.5–5.1)
PROT SERPL-MCNC: 6.4 GM/DL (ref 6–8.4)
RBC # BLD AUTO: 4.23 M/UL (ref 4–5.4)
RELATIVE EOSINOPHIL (OHS): 3.6 %
RELATIVE LYMPHOCYTE (OHS): 39.4 % (ref 18–48)
RELATIVE MONOCYTE (OHS): 8.1 % (ref 4–15)
RELATIVE NEUTROPHIL (OHS): 48 % (ref 38–73)
SODIUM SERPL-SCNC: 145 MMOL/L (ref 136–145)
WBC # BLD AUTO: 3.6 K/UL (ref 3.9–12.7)

## 2025-08-21 PROCEDURE — 25000003 PHARM REV CODE 250: Performed by: PHYSICIAN ASSISTANT

## 2025-08-21 PROCEDURE — 21400001 HC TELEMETRY ROOM

## 2025-08-21 PROCEDURE — C1887 CATHETER, GUIDING: HCPCS | Performed by: INTERNAL MEDICINE

## 2025-08-21 PROCEDURE — 25500020 PHARM REV CODE 255: Performed by: INTERNAL MEDICINE

## 2025-08-21 PROCEDURE — 5A09357 ASSISTANCE WITH RESPIRATORY VENTILATION, LESS THAN 24 CONSECUTIVE HOURS, CONTINUOUS POSITIVE AIRWAY PRESSURE: ICD-10-PCS | Performed by: FAMILY MEDICINE

## 2025-08-21 PROCEDURE — 63600175 PHARM REV CODE 636 W HCPCS: Performed by: INTERNAL MEDICINE

## 2025-08-21 PROCEDURE — C1769 GUIDE WIRE: HCPCS | Performed by: INTERNAL MEDICINE

## 2025-08-21 PROCEDURE — 99152 MOD SED SAME PHYS/QHP 5/>YRS: CPT | Mod: ,,, | Performed by: INTERNAL MEDICINE

## 2025-08-21 PROCEDURE — 4A023N8 MEASUREMENT OF CARDIAC SAMPLING AND PRESSURE, BILATERAL, PERCUTANEOUS APPROACH: ICD-10-PCS | Performed by: INTERNAL MEDICINE

## 2025-08-21 PROCEDURE — C1751 CATH, INF, PER/CENT/MIDLINE: HCPCS | Performed by: INTERNAL MEDICINE

## 2025-08-21 PROCEDURE — 94660 CPAP INITIATION&MGMT: CPT

## 2025-08-21 PROCEDURE — 93460 R&L HRT ART/VENTRICLE ANGIO: CPT | Mod: 26,,, | Performed by: INTERNAL MEDICINE

## 2025-08-21 PROCEDURE — 99900035 HC TECH TIME PER 15 MIN (STAT)

## 2025-08-21 PROCEDURE — 36415 COLL VENOUS BLD VENIPUNCTURE: CPT | Performed by: PHYSICIAN ASSISTANT

## 2025-08-21 PROCEDURE — B2151ZZ FLUOROSCOPY OF LEFT HEART USING LOW OSMOLAR CONTRAST: ICD-10-PCS | Performed by: INTERNAL MEDICINE

## 2025-08-21 PROCEDURE — 99152 MOD SED SAME PHYS/QHP 5/>YRS: CPT | Performed by: INTERNAL MEDICINE

## 2025-08-21 PROCEDURE — 25000003 PHARM REV CODE 250: Performed by: INTERNAL MEDICINE

## 2025-08-21 PROCEDURE — 82040 ASSAY OF SERUM ALBUMIN: CPT | Performed by: PHYSICIAN ASSISTANT

## 2025-08-21 PROCEDURE — 93460 R&L HRT ART/VENTRICLE ANGIO: CPT | Performed by: INTERNAL MEDICINE

## 2025-08-21 PROCEDURE — B2111ZZ FLUOROSCOPY OF MULTIPLE CORONARY ARTERIES USING LOW OSMOLAR CONTRAST: ICD-10-PCS | Performed by: INTERNAL MEDICINE

## 2025-08-21 PROCEDURE — 25000003 PHARM REV CODE 250: Performed by: HOSPITALIST

## 2025-08-21 PROCEDURE — C1894 INTRO/SHEATH, NON-LASER: HCPCS | Performed by: INTERNAL MEDICINE

## 2025-08-21 PROCEDURE — 85025 COMPLETE CBC W/AUTO DIFF WBC: CPT | Performed by: PHYSICIAN ASSISTANT

## 2025-08-21 PROCEDURE — 93005 ELECTROCARDIOGRAM TRACING: CPT | Mod: ER

## 2025-08-21 RX ORDER — ONDANSETRON 8 MG/1
8 TABLET, ORALLY DISINTEGRATING ORAL EVERY 8 HOURS PRN
Status: DISCONTINUED | OUTPATIENT
Start: 2025-08-21 | End: 2025-08-22 | Stop reason: HOSPADM

## 2025-08-21 RX ORDER — SODIUM CHLORIDE 9 MG/ML
INJECTION, SOLUTION INTRAVENOUS CONTINUOUS
Status: ACTIVE | OUTPATIENT
Start: 2025-08-21 | End: 2025-08-21

## 2025-08-21 RX ORDER — FUROSEMIDE 10 MG/ML
40 INJECTION INTRAMUSCULAR; INTRAVENOUS ONCE
Status: COMPLETED | OUTPATIENT
Start: 2025-08-21 | End: 2025-08-21

## 2025-08-21 RX ORDER — HEPARIN SODIUM 1000 [USP'U]/ML
INJECTION, SOLUTION INTRAVENOUS; SUBCUTANEOUS
Status: DISCONTINUED | OUTPATIENT
Start: 2025-08-21 | End: 2025-08-21 | Stop reason: HOSPADM

## 2025-08-21 RX ORDER — DIPHENHYDRAMINE HYDROCHLORIDE 50 MG/ML
INJECTION, SOLUTION INTRAMUSCULAR; INTRAVENOUS
Status: DISCONTINUED | OUTPATIENT
Start: 2025-08-21 | End: 2025-08-21 | Stop reason: HOSPADM

## 2025-08-21 RX ORDER — SODIUM CHLORIDE 9 MG/ML
INJECTION, SOLUTION INTRAVENOUS CONTINUOUS
Status: DISCONTINUED | OUTPATIENT
Start: 2025-08-21 | End: 2025-08-22 | Stop reason: HOSPADM

## 2025-08-21 RX ORDER — LIDOCAINE HYDROCHLORIDE 20 MG/ML
INJECTION, SOLUTION EPIDURAL; INFILTRATION; INTRACAUDAL; PERINEURAL
Status: DISCONTINUED | OUTPATIENT
Start: 2025-08-21 | End: 2025-08-21 | Stop reason: HOSPADM

## 2025-08-21 RX ORDER — NITROGLYCERIN 5 MG/ML
INJECTION, SOLUTION INTRAVENOUS
Status: DISCONTINUED | OUTPATIENT
Start: 2025-08-21 | End: 2025-08-21 | Stop reason: HOSPADM

## 2025-08-21 RX ORDER — FENTANYL CITRATE 50 UG/ML
INJECTION, SOLUTION INTRAMUSCULAR; INTRAVENOUS
Status: DISCONTINUED | OUTPATIENT
Start: 2025-08-21 | End: 2025-08-21 | Stop reason: HOSPADM

## 2025-08-21 RX ORDER — ACETAMINOPHEN 325 MG/1
650 TABLET ORAL EVERY 4 HOURS PRN
Status: DISCONTINUED | OUTPATIENT
Start: 2025-08-21 | End: 2025-08-22 | Stop reason: HOSPADM

## 2025-08-21 RX ORDER — MIDAZOLAM HYDROCHLORIDE 1 MG/ML
INJECTION INTRAMUSCULAR; INTRAVENOUS
Status: DISCONTINUED | OUTPATIENT
Start: 2025-08-21 | End: 2025-08-21 | Stop reason: HOSPADM

## 2025-08-21 RX ADMIN — SODIUM CHLORIDE: 9 INJECTION, SOLUTION INTRAVENOUS at 04:08

## 2025-08-21 RX ADMIN — SOTALOL HYDROCHLORIDE 80 MG: 80 TABLET ORAL at 09:08

## 2025-08-21 RX ADMIN — PANTOPRAZOLE SODIUM 40 MG: 40 TABLET, DELAYED RELEASE ORAL at 09:08

## 2025-08-21 RX ADMIN — BUSPIRONE HYDROCHLORIDE 15 MG: 10 TABLET ORAL at 09:08

## 2025-08-21 RX ADMIN — CLOPIDOGREL 75 MG: 75 TABLET ORAL at 08:08

## 2025-08-21 RX ADMIN — VALSARTAN 80 MG: 40 TABLET, FILM COATED ORAL at 08:08

## 2025-08-21 RX ADMIN — ASPIRIN 81 MG CHEWABLE TABLET 81 MG: 81 TABLET CHEWABLE at 08:08

## 2025-08-21 RX ADMIN — ATORVASTATIN CALCIUM 20 MG: 10 TABLET, FILM COATED ORAL at 08:08

## 2025-08-21 RX ADMIN — FUROSEMIDE 40 MG: 10 INJECTION, SOLUTION INTRAMUSCULAR; INTRAVENOUS at 04:08

## 2025-08-22 VITALS
HEART RATE: 77 BPM | HEIGHT: 66 IN | WEIGHT: 204.13 LBS | DIASTOLIC BLOOD PRESSURE: 72 MMHG | BODY MASS INDEX: 32.81 KG/M2 | RESPIRATION RATE: 18 BRPM | OXYGEN SATURATION: 95 % | TEMPERATURE: 98 F | SYSTOLIC BLOOD PRESSURE: 137 MMHG

## 2025-08-22 LAB
ABSOLUTE EOSINOPHIL (OHS): 0.16 K/UL
ABSOLUTE MONOCYTE (OHS): 0.33 K/UL (ref 0.3–1)
ABSOLUTE NEUTROPHIL COUNT (OHS): 2.42 K/UL (ref 1.8–7.7)
ANION GAP (OHS): 10 MMOL/L (ref 8–16)
BASOPHILS # BLD AUTO: 0.01 K/UL
BASOPHILS NFR BLD AUTO: 0.2 %
BUN SERPL-MCNC: 15 MG/DL (ref 6–20)
CALCIUM SERPL-MCNC: 8.8 MG/DL (ref 8.7–10.5)
CHLORIDE SERPL-SCNC: 105 MMOL/L (ref 95–110)
CO2 SERPL-SCNC: 28 MMOL/L (ref 23–29)
CREAT SERPL-MCNC: 0.8 MG/DL (ref 0.5–1.4)
ERYTHROCYTE [DISTWIDTH] IN BLOOD BY AUTOMATED COUNT: 14.3 % (ref 11.5–14.5)
GFR SERPLBLD CREATININE-BSD FMLA CKD-EPI: >60 ML/MIN/1.73/M2
GLUCOSE SERPL-MCNC: 110 MG/DL (ref 70–110)
HCT VFR BLD AUTO: 38.4 % (ref 37–48.5)
HGB BLD-MCNC: 12.4 GM/DL (ref 12–16)
IMM GRANULOCYTES # BLD AUTO: 0.02 K/UL (ref 0–0.04)
IMM GRANULOCYTES NFR BLD AUTO: 0.4 % (ref 0–0.5)
LYMPHOCYTES # BLD AUTO: 1.76 K/UL (ref 1–4.8)
MCH RBC QN AUTO: 28.5 PG (ref 27–31)
MCHC RBC AUTO-ENTMCNC: 32.3 G/DL (ref 32–36)
MCV RBC AUTO: 88 FL (ref 82–98)
NUCLEATED RBC (/100WBC) (OHS): 0 /100 WBC
OHS QRS DURATION: 112 MS
OHS QTC CALCULATION: 455 MS
PLATELET # BLD AUTO: 177 K/UL (ref 150–450)
PMV BLD AUTO: 10.9 FL (ref 9.2–12.9)
POCT GLUCOSE: 104 MG/DL (ref 70–110)
POCT GLUCOSE: 96 MG/DL (ref 70–110)
POTASSIUM SERPL-SCNC: 3.5 MMOL/L (ref 3.5–5.1)
RBC # BLD AUTO: 4.35 M/UL (ref 4–5.4)
RELATIVE EOSINOPHIL (OHS): 3.4 %
RELATIVE LYMPHOCYTE (OHS): 37.4 % (ref 18–48)
RELATIVE MONOCYTE (OHS): 7 % (ref 4–15)
RELATIVE NEUTROPHIL (OHS): 51.6 % (ref 38–73)
SODIUM SERPL-SCNC: 143 MMOL/L (ref 136–145)
WBC # BLD AUTO: 4.7 K/UL (ref 3.9–12.7)

## 2025-08-22 PROCEDURE — 25000003 PHARM REV CODE 250: Performed by: PHYSICIAN ASSISTANT

## 2025-08-22 PROCEDURE — 85025 COMPLETE CBC W/AUTO DIFF WBC: CPT | Performed by: INTERNAL MEDICINE

## 2025-08-22 PROCEDURE — 36415 COLL VENOUS BLD VENIPUNCTURE: CPT | Performed by: INTERNAL MEDICINE

## 2025-08-22 PROCEDURE — 25000003 PHARM REV CODE 250: Performed by: HOSPITALIST

## 2025-08-22 PROCEDURE — 80048 BASIC METABOLIC PNL TOTAL CA: CPT | Performed by: INTERNAL MEDICINE

## 2025-08-22 RX ORDER — SOTALOL HYDROCHLORIDE 80 MG/1
80 TABLET ORAL 2 TIMES DAILY
Start: 2025-08-22 | End: 2026-08-22

## 2025-08-22 RX ORDER — METOPROLOL SUCCINATE 25 MG/1
25 TABLET, EXTENDED RELEASE ORAL DAILY
Status: DISCONTINUED | OUTPATIENT
Start: 2025-08-22 | End: 2025-08-22 | Stop reason: HOSPADM

## 2025-08-22 RX ADMIN — ATORVASTATIN CALCIUM 20 MG: 10 TABLET, FILM COATED ORAL at 08:08

## 2025-08-22 RX ADMIN — SOTALOL HYDROCHLORIDE 80 MG: 80 TABLET ORAL at 08:08

## 2025-08-22 RX ADMIN — ASPIRIN 81 MG CHEWABLE TABLET 81 MG: 81 TABLET CHEWABLE at 08:08

## 2025-08-22 RX ADMIN — PANTOPRAZOLE SODIUM 40 MG: 40 TABLET, DELAYED RELEASE ORAL at 08:08

## 2025-08-22 RX ADMIN — VALSARTAN 80 MG: 40 TABLET, FILM COATED ORAL at 08:08

## 2025-08-22 RX ADMIN — METOPROLOL SUCCINATE 25 MG: 25 TABLET, EXTENDED RELEASE ORAL at 08:08

## 2025-08-22 RX ADMIN — CLOPIDOGREL 75 MG: 75 TABLET ORAL at 08:08

## 2025-08-25 ENCOUNTER — DOCUMENTATION ONLY (OUTPATIENT)
Dept: CARDIOLOGY | Facility: CLINIC | Age: 55
End: 2025-08-25
Payer: MEDICARE

## 2025-08-25 ENCOUNTER — TELEPHONE (OUTPATIENT)
Dept: HOME HEALTH SERVICES | Facility: CLINIC | Age: 55
End: 2025-08-25
Payer: MEDICARE

## 2025-08-25 PROBLEM — I21.4 NSTEMI (NON-ST ELEVATED MYOCARDIAL INFARCTION): Status: ACTIVE | Noted: 2025-08-25

## 2025-08-28 ENCOUNTER — OFFICE VISIT (OUTPATIENT)
Dept: HOME HEALTH SERVICES | Facility: CLINIC | Age: 55
End: 2025-08-28
Payer: MEDICARE

## 2025-08-28 VITALS
TEMPERATURE: 98 F | SYSTOLIC BLOOD PRESSURE: 130 MMHG | HEART RATE: 92 BPM | DIASTOLIC BLOOD PRESSURE: 80 MMHG | RESPIRATION RATE: 18 BRPM | OXYGEN SATURATION: 96 %

## 2025-08-28 DIAGNOSIS — R00.2 PALPITATIONS: Primary | ICD-10-CM

## 2025-08-28 DIAGNOSIS — I50.22 CHRONIC SYSTOLIC CONGESTIVE HEART FAILURE: Chronic | ICD-10-CM

## (undated) DEVICE — CATH CV QD LUMN 6FRX110CM

## (undated) DEVICE — CATH JL4 5FR

## (undated) DEVICE — CATH INFINITI MULTIPAK JR4 5FR

## (undated) DEVICE — CATH JR4 5FR

## (undated) DEVICE — GUIDEWIRE SV-5 ST .018IN 300CM

## (undated) DEVICE — CONTRAST VISIPAQUE 150ML

## (undated) DEVICE — GUIDEWIRE WHOLEY HI TORQ 175CM

## (undated) DEVICE — Device

## (undated) DEVICE — CATH PIG145 INFINITI 5X110CM

## (undated) DEVICE — ANGIOTOUCH KIT

## (undated) DEVICE — OMNIPAQUE 300MG 150ML VIAL

## (undated) DEVICE — GUIDE LAUNCHER 6FR EBU 3.5

## (undated) DEVICE — GUIDEWIRE CHOICE PT FLPY 182CM

## (undated) DEVICE — PACK HEART CATH BR

## (undated) DEVICE — BAND TR COMP DEVICE REG 24CM